# Patient Record
Sex: MALE | Race: OTHER | NOT HISPANIC OR LATINO | URBAN - METROPOLITAN AREA
[De-identification: names, ages, dates, MRNs, and addresses within clinical notes are randomized per-mention and may not be internally consistent; named-entity substitution may affect disease eponyms.]

---

## 2017-05-27 ENCOUNTER — INPATIENT (INPATIENT)
Facility: HOSPITAL | Age: 32
LOS: 5 days | Discharge: HOME HEALTH | End: 2017-06-02
Attending: SURGERY | Admitting: SURGERY
Payer: MEDICAID

## 2017-05-27 VITALS
TEMPERATURE: 98 F | SYSTOLIC BLOOD PRESSURE: 139 MMHG | RESPIRATION RATE: 16 BRPM | OXYGEN SATURATION: 99 % | HEART RATE: 108 BPM | DIASTOLIC BLOOD PRESSURE: 77 MMHG

## 2017-05-27 DIAGNOSIS — A48.0 GAS GANGRENE: ICD-10-CM

## 2017-05-27 DIAGNOSIS — E11.621 TYPE 2 DIABETES MELLITUS WITH FOOT ULCER: ICD-10-CM

## 2017-05-27 LAB
ALBUMIN SERPL ELPH-MCNC: 4.1 G/DL — SIGNIFICANT CHANGE UP (ref 3.3–5)
ALP SERPL-CCNC: 96 U/L — SIGNIFICANT CHANGE UP (ref 40–120)
ALT FLD-CCNC: 31 U/L — SIGNIFICANT CHANGE UP (ref 4–41)
APPEARANCE UR: CLEAR — SIGNIFICANT CHANGE UP
APTT BLD: 30.6 SEC — SIGNIFICANT CHANGE UP (ref 27.5–37.4)
AST SERPL-CCNC: 24 U/L — SIGNIFICANT CHANGE UP (ref 4–40)
BASE EXCESS BLDV CALC-SCNC: 4.5 MMOL/L — SIGNIFICANT CHANGE UP
BASOPHILS # BLD AUTO: 0.04 K/UL — SIGNIFICANT CHANGE UP (ref 0–0.2)
BASOPHILS NFR BLD AUTO: 0.2 % — SIGNIFICANT CHANGE UP (ref 0–2)
BILIRUB SERPL-MCNC: 0.4 MG/DL — SIGNIFICANT CHANGE UP (ref 0.2–1.2)
BILIRUB UR-MCNC: NEGATIVE — SIGNIFICANT CHANGE UP
BLOOD GAS VENOUS - CREATININE: 0.74 MG/DL — SIGNIFICANT CHANGE UP (ref 0.5–1.3)
BLOOD UR QL VISUAL: NEGATIVE — SIGNIFICANT CHANGE UP
BUN SERPL-MCNC: 15 MG/DL — SIGNIFICANT CHANGE UP (ref 7–23)
CALCIUM SERPL-MCNC: 9.5 MG/DL — SIGNIFICANT CHANGE UP (ref 8.4–10.5)
CHLORIDE BLDV-SCNC: 101 MMOL/L — SIGNIFICANT CHANGE UP (ref 96–108)
CHLORIDE SERPL-SCNC: 95 MMOL/L — LOW (ref 98–107)
CO2 SERPL-SCNC: 29 MMOL/L — SIGNIFICANT CHANGE UP (ref 22–31)
COLOR SPEC: YELLOW — SIGNIFICANT CHANGE UP
CREAT SERPL-MCNC: 0.84 MG/DL — SIGNIFICANT CHANGE UP (ref 0.5–1.3)
CRP SERPL-MCNC: 72.4 MG/L — HIGH (ref 0.3–5)
EOSINOPHIL # BLD AUTO: 0.14 K/UL — SIGNIFICANT CHANGE UP (ref 0–0.5)
EOSINOPHIL NFR BLD AUTO: 0.8 % — SIGNIFICANT CHANGE UP (ref 0–6)
ERYTHROCYTE [SEDIMENTATION RATE] IN BLOOD: 40 MM/HR — HIGH (ref 1–15)
GAS PNL BLDV: 130 MMOL/L — LOW (ref 136–146)
GLUCOSE BLDV-MCNC: 252 — HIGH (ref 70–99)
GLUCOSE SERPL-MCNC: 254 MG/DL — HIGH (ref 70–99)
GLUCOSE UR-MCNC: >1000 — SIGNIFICANT CHANGE UP
HCO3 BLDV-SCNC: 27 MMOL/L — SIGNIFICANT CHANGE UP (ref 20–27)
HCT VFR BLD CALC: 42.9 % — SIGNIFICANT CHANGE UP (ref 39–50)
HCT VFR BLDV CALC: 46.6 % — SIGNIFICANT CHANGE UP (ref 39–51)
HGB BLD-MCNC: 14.3 G/DL — SIGNIFICANT CHANGE UP (ref 13–17)
HGB BLDV-MCNC: 15.2 G/DL — SIGNIFICANT CHANGE UP (ref 13–17)
IMM GRANULOCYTES NFR BLD AUTO: 0.2 % — SIGNIFICANT CHANGE UP (ref 0–1.5)
INR BLD: 1.11 — SIGNIFICANT CHANGE UP (ref 0.88–1.17)
KETONES UR-MCNC: SIGNIFICANT CHANGE UP
LACTATE BLDV-MCNC: 1.5 MMOL/L — SIGNIFICANT CHANGE UP (ref 0.5–2)
LEUKOCYTE ESTERASE UR-ACNC: NEGATIVE — SIGNIFICANT CHANGE UP
LYMPHOCYTES # BLD AUTO: 1.98 K/UL — SIGNIFICANT CHANGE UP (ref 1–3.3)
LYMPHOCYTES # BLD AUTO: 10.9 % — LOW (ref 13–44)
MCHC RBC-ENTMCNC: 28.5 PG — SIGNIFICANT CHANGE UP (ref 27–34)
MCHC RBC-ENTMCNC: 33.3 % — SIGNIFICANT CHANGE UP (ref 32–36)
MCV RBC AUTO: 85.5 FL — SIGNIFICANT CHANGE UP (ref 80–100)
MONOCYTES # BLD AUTO: 1.17 K/UL — HIGH (ref 0–0.9)
MONOCYTES NFR BLD AUTO: 6.5 % — SIGNIFICANT CHANGE UP (ref 2–14)
MUCOUS THREADS # UR AUTO: SIGNIFICANT CHANGE UP
NEUTROPHILS # BLD AUTO: 14.76 K/UL — HIGH (ref 1.8–7.4)
NEUTROPHILS NFR BLD AUTO: 81.4 % — HIGH (ref 43–77)
NITRITE UR-MCNC: NEGATIVE — SIGNIFICANT CHANGE UP
PCO2 BLDV: 50 MMHG — SIGNIFICANT CHANGE UP (ref 41–51)
PH BLDV: 7.38 PH — SIGNIFICANT CHANGE UP (ref 7.32–7.43)
PH UR: 6 — SIGNIFICANT CHANGE UP (ref 4.6–8)
PLATELET # BLD AUTO: 347 K/UL — SIGNIFICANT CHANGE UP (ref 150–400)
PMV BLD: 9.7 FL — SIGNIFICANT CHANGE UP (ref 7–13)
PO2 BLDV: 31 MMHG — LOW (ref 35–40)
POTASSIUM BLDV-SCNC: 4.3 MMOL/L — SIGNIFICANT CHANGE UP (ref 3.4–4.5)
POTASSIUM SERPL-MCNC: 4.3 MMOL/L — SIGNIFICANT CHANGE UP (ref 3.5–5.3)
POTASSIUM SERPL-SCNC: 4.3 MMOL/L — SIGNIFICANT CHANGE UP (ref 3.5–5.3)
PROT SERPL-MCNC: 7.7 G/DL — SIGNIFICANT CHANGE UP (ref 6–8.3)
PROT UR-MCNC: 30 — SIGNIFICANT CHANGE UP
PROTHROM AB SERPL-ACNC: 12.5 SEC — SIGNIFICANT CHANGE UP (ref 9.8–13.1)
RBC # BLD: 5.02 M/UL — SIGNIFICANT CHANGE UP (ref 4.2–5.8)
RBC # FLD: 12.9 % — SIGNIFICANT CHANGE UP (ref 10.3–14.5)
RBC CASTS # UR COMP ASSIST: SIGNIFICANT CHANGE UP (ref 0–?)
SAO2 % BLDV: 56.5 % — LOW (ref 60–85)
SODIUM SERPL-SCNC: 134 MMOL/L — LOW (ref 135–145)
SP GR SPEC: 1.03 — SIGNIFICANT CHANGE UP (ref 1–1.03)
SQUAMOUS # UR AUTO: SIGNIFICANT CHANGE UP
UROBILINOGEN FLD QL: 1 E.U. — SIGNIFICANT CHANGE UP (ref 0.1–0.2)
WBC # BLD: 18.13 K/UL — HIGH (ref 3.8–10.5)
WBC # FLD AUTO: 18.13 K/UL — HIGH (ref 3.8–10.5)
WBC UR QL: SIGNIFICANT CHANGE UP (ref 0–?)

## 2017-05-27 PROCEDURE — 93971 EXTREMITY STUDY: CPT | Mod: 26,LT

## 2017-05-27 PROCEDURE — 73590 X-RAY EXAM OF LOWER LEG: CPT | Mod: 26,RT

## 2017-05-27 PROCEDURE — 73630 X-RAY EXAM OF FOOT: CPT | Mod: 26,RT

## 2017-05-27 PROCEDURE — 71020: CPT | Mod: 26

## 2017-05-27 RX ORDER — VANCOMYCIN HCL 1 G
1000 VIAL (EA) INTRAVENOUS ONCE
Qty: 0 | Refills: 0 | Status: COMPLETED | OUTPATIENT
Start: 2017-05-27 | End: 2017-05-27

## 2017-05-27 RX ORDER — CEFAZOLIN SODIUM 1 G
1000 VIAL (EA) INJECTION ONCE
Qty: 0 | Refills: 0 | Status: COMPLETED | OUTPATIENT
Start: 2017-05-27 | End: 2017-05-27

## 2017-05-27 RX ADMIN — Medication 250 MILLIGRAM(S): at 22:43

## 2017-05-27 RX ADMIN — Medication 100 MILLIGRAM(S): at 20:38

## 2017-05-27 RX ADMIN — Medication 100 MILLIGRAM(S): at 21:59

## 2017-05-27 NOTE — ED PROVIDER NOTE - PHYSICAL EXAMINATION
Attending/Emerson: Well-appearing, NAD; PERRL/EOMI, non-icterus, supple, no JOSSELIN, no JVD, RRR, CTAB; Abd-soft, NT/ND, no HSM; no spinal PT; Rt foot-swelling, +warm, 5th toe discoloration with skin breakdown at the base, A&Ox3, nonfocal; Skin-warm/dry

## 2017-05-27 NOTE — ED PROVIDER NOTE - OBJECTIVE STATEMENT
Attending/Emerson: 33 yo M w/ Type I DM p/w foot pain and subjective fever/ Pt reports ~ 1 week ago of "peeling" off a blister on Rt 5th toe subsequent developing swelling, pain and now subjective fever/ Denies weakness, abd pain n/v  Meds: Novolog 70/30 20 U BID prior to meals; Lantus 30U QHS; Glipizide

## 2017-05-27 NOTE — PROGRESS NOTE ADULT - PROBLEM SELECTOR PLAN 1
- Pt seen and examined  - Rec admission  - IV abx Vanco and Zosyn  - Will discuss w/ attending Dr. Blue - Pt seen and examined  - Rec admission   - Added on for partial fifth ray amputation in am w/ Dr. Blue  - Rec IV vanco and zosyn  - Pt consented  - 14 cc of 1% lidocaine plain given about 5th met. Sharp incisional bedside debridement performed of 4th interspace. Gas and purulence expressed. Flushed, cultured, and packed. Dressed with DSD.   - NPO after midnight. PO meds with sips of water

## 2017-05-27 NOTE — PROGRESS NOTE ADULT - SUBJECTIVE AND OBJECTIVE BOX
Patient is a 32y old  Male who presents with a chief complaint of     HPI:      PAST MEDICAL & SURGICAL HISTORY:  Diabetes  No significant past surgical history      MEDICATIONS  (STANDING):    MEDICATIONS  (PRN):      Allergies    No Known Allergies    Intolerances        VITALS:    Vital Signs Last 24 Hrs  T(C): 37.7, Max: 37.7 (05-27 @ 20:40)  T(F): 99.8, Max: 99.8 (05-27 @ 20:40)  HR: 89 (89 - 108)  BP: 116/70 (116/70 - 139/77)  BP(mean): 85 (85 - 85)  RR: 15 (15 - 16)  SpO2: 98% (98% - 99%)    LABS:                          14.3   18.13 )-----------( 347      ( 27 May 2017 19:45 )             42.9       05-27    134<L>  |  95<L>  |  15  ----------------------------<  254<H>  4.3   |  29  |  0.84    Ca    9.5      27 May 2017 19:45    TPro  7.7  /  Alb  4.1  /  TBili  0.4  /  DBili  x   /  AST  24  /  ALT  31  /  AlkPhos  96  05-27      CAPILLARY BLOOD GLUCOSE  219 (27 May 2017 18:56)          LOWER EXTREMITY PHYSICAL EXAM:    Vasular: DP/PT 2/4, B/L, Temperature gradient WNL, B/L.   Neuro: Epicritic sensation diminished to the level of the forefoot, B/L.  Musculoskeletal/Ortho:  Skin: R foot 5th digit ulcer to    RADIOLOGY & ADDITIONAL STUDIES: Patient is a 32y old  Male who presents with a chief complaint of     HPI:  Attending/Emerson: 31 yo M w/ Type I DM p/w foot pain and subjective fever/ Pt reports ~ 1 week ago of "peeling" off a blister on Rt 5th toe subsequent developing swelling, pain and now subjective fever/ Denies weakness, abd pain, n/v. 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       PAST MEDICAL & SURGICAL HISTORY:  Diabetes  No significant past surgical history      MEDICATIONS  (STANDING):    MEDICATIONS  (PRN):    Allergies    No Known Allergies    Intolerances      VITALS:    Vital Signs Last 24 Hrs  T(C): 37.7, Max: 37.7 (05-27 @ 20:40)  T(F): 99.8, Max: 99.8 (05-27 @ 20:40)  HR: 89 (89 - 108)  BP: 116/70 (116/70 - 139/77)  BP(mean): 85 (85 - 85)  RR: 15 (15 - 16)  SpO2: 98% (98% - 99%)    LABS:                        14.3   18.13 )-----------( 347      ( 27 May 2017 19:45 )             42.9       05-27    134<L>  |  95<L>  |  15  ----------------------------<  254<H>  4.3   |  29  |  0.84    Ca    9.5      27 May 2017 19:45    TPro  7.7  /  Alb  4.1  /  TBili  0.4  /  DBili  x   /  AST  24  /  ALT  31  /  AlkPhos  96  05-27      CAPILLARY BLOOD GLUCOSE  219 (27 May 2017 18:56)    LOWER EXTREMITY PHYSICAL EXAM:    Vasular: DP/PT 2/4, B/L, Temperature gradient WNL, B/L.   Neuro: Epicritic sensation diminished to the level of the toes, B/L.  Musculoskeletal/Ortho: S/p previous R foot hallux debridement scar  Skin: R foot 5th digit ulcer to sub Q, 5th digit edematous, erythematous, extends to 4th interspace. After debridement, mild watery purulence expressed from 4th interspace, malodorous, necrotic tissue near 5th met base.     RADIOLOGY & ADDITIONAL STUDIES:  5/27/17 R foot xray: gas gangrene of 4th interspace as read by resident Dr. Bianca Donnelly

## 2017-05-27 NOTE — ED ADULT NURSE NOTE - OBJECTIVE STATEMENT
Received pt to spot 27 A&Ox4 c/o right toe wound Received pt to spot 27 A&Ox4 c/o right toe wound measuring 2cm x 0.5cm and subjective fevers at home, pt denies any other complaints at this time, NAD noted. Reports hx of DM type 1 taking insulin and glipazide. IV placed, labs sent, VS as noted, will reassess.

## 2017-05-28 DIAGNOSIS — L08.9 LOCAL INFECTION OF THE SKIN AND SUBCUTANEOUS TISSUE, UNSPECIFIED: ICD-10-CM

## 2017-05-28 LAB
BLD GP AB SCN SERPL QL: NEGATIVE — SIGNIFICANT CHANGE UP
GRAM STN SPEC: SIGNIFICANT CHANGE UP
GRAM STN WND: SIGNIFICANT CHANGE UP
RH IG SCN BLD-IMP: POSITIVE — SIGNIFICANT CHANGE UP
RH IG SCN BLD-IMP: POSITIVE — SIGNIFICANT CHANGE UP
SPECIMEN SOURCE: SIGNIFICANT CHANGE UP

## 2017-05-28 PROCEDURE — 93923 UPR/LXTR ART STDY 3+ LVLS: CPT | Mod: 26

## 2017-05-28 PROCEDURE — 88311 DECALCIFY TISSUE: CPT | Mod: 26

## 2017-05-28 PROCEDURE — 73630 X-RAY EXAM OF FOOT: CPT | Mod: 26,RT

## 2017-05-28 PROCEDURE — 93010 ELECTROCARDIOGRAM REPORT: CPT

## 2017-05-28 PROCEDURE — 88305 TISSUE EXAM BY PATHOLOGIST: CPT | Mod: 26

## 2017-05-28 PROCEDURE — 99232 SBSQ HOSP IP/OBS MODERATE 35: CPT

## 2017-05-28 RX ORDER — INSULIN LISPRO 100/ML
VIAL (ML) SUBCUTANEOUS EVERY 6 HOURS
Qty: 0 | Refills: 0 | Status: DISCONTINUED | OUTPATIENT
Start: 2017-05-28 | End: 2017-05-28

## 2017-05-28 RX ORDER — INSULIN LISPRO 100/ML
VIAL (ML) SUBCUTANEOUS
Qty: 0 | Refills: 0 | Status: DISCONTINUED | OUTPATIENT
Start: 2017-05-28 | End: 2017-06-02

## 2017-05-28 RX ORDER — SODIUM CHLORIDE 9 MG/ML
1000 INJECTION, SOLUTION INTRAVENOUS
Qty: 0 | Refills: 0 | Status: DISCONTINUED | OUTPATIENT
Start: 2017-05-28 | End: 2017-05-28

## 2017-05-28 RX ORDER — INSULIN GLARGINE 100 [IU]/ML
20 INJECTION, SOLUTION SUBCUTANEOUS AT BEDTIME
Qty: 0 | Refills: 0 | Status: DISCONTINUED | OUTPATIENT
Start: 2017-05-28 | End: 2017-05-28

## 2017-05-28 RX ORDER — VANCOMYCIN HCL 1 G
1000 VIAL (EA) INTRAVENOUS EVERY 12 HOURS
Qty: 0 | Refills: 0 | Status: DISCONTINUED | OUTPATIENT
Start: 2017-05-28 | End: 2017-05-29

## 2017-05-28 RX ORDER — ACETAMINOPHEN 500 MG
650 TABLET ORAL EVERY 6 HOURS
Qty: 0 | Refills: 0 | Status: DISCONTINUED | OUTPATIENT
Start: 2017-05-28 | End: 2017-06-02

## 2017-05-28 RX ORDER — INSULIN GLARGINE 100 [IU]/ML
30 INJECTION, SOLUTION SUBCUTANEOUS AT BEDTIME
Qty: 0 | Refills: 0 | Status: DISCONTINUED | OUTPATIENT
Start: 2017-05-28 | End: 2017-05-31

## 2017-05-28 RX ORDER — PIPERACILLIN AND TAZOBACTAM 4; .5 G/20ML; G/20ML
3.38 INJECTION, POWDER, LYOPHILIZED, FOR SOLUTION INTRAVENOUS EVERY 8 HOURS
Qty: 0 | Refills: 0 | Status: DISCONTINUED | OUTPATIENT
Start: 2017-05-28 | End: 2017-05-30

## 2017-05-28 RX ORDER — HEPARIN SODIUM 5000 [USP'U]/ML
5000 INJECTION INTRAVENOUS; SUBCUTANEOUS EVERY 8 HOURS
Qty: 0 | Refills: 0 | Status: DISCONTINUED | OUTPATIENT
Start: 2017-05-28 | End: 2017-06-02

## 2017-05-28 RX ORDER — INSULIN GLARGINE 100 [IU]/ML
30 INJECTION, SOLUTION SUBCUTANEOUS AT BEDTIME
Qty: 0 | Refills: 0 | Status: DISCONTINUED | OUTPATIENT
Start: 2017-05-28 | End: 2017-05-28

## 2017-05-28 RX ORDER — HYDROMORPHONE HYDROCHLORIDE 2 MG/ML
0.5 INJECTION INTRAMUSCULAR; INTRAVENOUS; SUBCUTANEOUS EVERY 4 HOURS
Qty: 0 | Refills: 0 | Status: DISCONTINUED | OUTPATIENT
Start: 2017-05-28 | End: 2017-05-29

## 2017-05-28 RX ORDER — HYDROMORPHONE HYDROCHLORIDE 2 MG/ML
1 INJECTION INTRAMUSCULAR; INTRAVENOUS; SUBCUTANEOUS EVERY 4 HOURS
Qty: 0 | Refills: 0 | Status: DISCONTINUED | OUTPATIENT
Start: 2017-05-28 | End: 2017-05-29

## 2017-05-28 RX ADMIN — Medication 250 MILLIGRAM(S): at 18:20

## 2017-05-28 RX ADMIN — PIPERACILLIN AND TAZOBACTAM 25 GRAM(S): 4; .5 INJECTION, POWDER, LYOPHILIZED, FOR SOLUTION INTRAVENOUS at 21:45

## 2017-05-28 RX ADMIN — Medication: at 22:41

## 2017-05-28 RX ADMIN — PIPERACILLIN AND TAZOBACTAM 25 GRAM(S): 4; .5 INJECTION, POWDER, LYOPHILIZED, FOR SOLUTION INTRAVENOUS at 13:22

## 2017-05-28 RX ADMIN — HEPARIN SODIUM 5000 UNIT(S): 5000 INJECTION INTRAVENOUS; SUBCUTANEOUS at 21:46

## 2017-05-28 RX ADMIN — Medication 2: at 13:12

## 2017-05-28 RX ADMIN — Medication 6: at 18:20

## 2017-05-28 RX ADMIN — Medication: at 08:11

## 2017-05-28 RX ADMIN — HEPARIN SODIUM 5000 UNIT(S): 5000 INJECTION INTRAVENOUS; SUBCUTANEOUS at 13:13

## 2017-05-28 RX ADMIN — HEPARIN SODIUM 5000 UNIT(S): 5000 INJECTION INTRAVENOUS; SUBCUTANEOUS at 06:35

## 2017-05-28 RX ADMIN — Medication 250 MILLIGRAM(S): at 05:06

## 2017-05-28 RX ADMIN — SODIUM CHLORIDE 125 MILLILITER(S): 9 INJECTION, SOLUTION INTRAVENOUS at 13:13

## 2017-05-28 RX ADMIN — PIPERACILLIN AND TAZOBACTAM 25 GRAM(S): 4; .5 INJECTION, POWDER, LYOPHILIZED, FOR SOLUTION INTRAVENOUS at 06:34

## 2017-05-28 RX ADMIN — INSULIN GLARGINE 30 UNIT(S): 100 INJECTION, SOLUTION SUBCUTANEOUS at 22:30

## 2017-05-28 RX ADMIN — SODIUM CHLORIDE 125 MILLILITER(S): 9 INJECTION, SOLUTION INTRAVENOUS at 02:08

## 2017-05-28 RX ADMIN — SODIUM CHLORIDE 125 MILLILITER(S): 9 INJECTION, SOLUTION INTRAVENOUS at 03:18

## 2017-05-28 NOTE — H&P ADULT - NSHPLABSRESULTS_GEN_ALL_CORE
CBC Full  -  ( 27 May 2017 19:45 )  WBC Count : 18.13 K/uL  Hemoglobin : 14.3 g/dL  Hematocrit : 42.9 %  Platelet Count - Automated : 347 K/uL  Mean Cell Volume : 85.5 fL  Mean Cell Hemoglobin : 28.5 pg  Mean Cell Hemoglobin Concentration : 33.3 %  Auto Neutrophil # : 14.76 K/uL  Auto Lymphocyte # : 1.98 K/uL  Auto Monocyte # : 1.17 K/uL  Auto Eosinophil # : 0.14 K/uL  Auto Basophil # : 0.04 K/uL  Auto Neutrophil % : 81.4 %  Auto Lymphocyte % : 10.9 %  Auto Monocyte % : 6.5 %  Auto Eosinophil % : 0.8 %  Auto Basophil % : 0.2 %    05-27    134<L>  |  95<L>  |  15  ----------------------------<  254<H>  4.3   |  29  |  0.84    Ca    9.5      27 May 2017 19:45    TPro  7.7  /  Alb  4.1  /  TBili  0.4  /  DBili  x   /  AST  24  /  ALT  31  /  AlkPhos  96  05-27      US: no DVT

## 2017-05-28 NOTE — PROGRESS NOTE ADULT - ASSESSMENT
ASSESSMENT/PLAN: FATTHY ANGLE 32y Male with right 5th toe gangrene   - Diet:   - Pain control   - Input and outputs   - DVT ppx   - OOB/incentive spirometry   MEDICATIONS  (STANDING):  vancomycin  IVPB 1000milliGRAM(s) IV Intermittent every 12 hours  piperacillin/tazobactam IVPB. 3.375Gram(s) IV Intermittent every 8 hours  lactated ringers. 1000milliLiter(s) IV Continuous <Continuous>  insulin lispro (HumaLOG) corrective regimen sliding scale  SubCutaneous every 6 hours  insulin glargine Injectable (LANTUS) 20Unit(s) SubCutaneous at bedtime    MEDICATIONS  (PRN):  HYDROmorphone  Injectable 0.5milliGRAM(s) IV Push every 4 hours PRN Mild Pain (1 - 3)  HYDROmorphone  Injectable 1milliGRAM(s) IV Push every 4 hours PRN Moderate Pain (4 - 6) ASSESSMENT/PLAN: FATTHY ANGLE 32y Male with right 5th toe gangrene   - Diet: NPO  - OR with podiatry   - Pain control   - Input and outputs   - DVT ppx   - OOB/incentive spirometry   MEDICATIONS  (STANDING):  vancomycin  IVPB 1000milliGRAM(s) IV Intermittent every 12 hours  piperacillin/tazobactam IVPB. 3.375Gram(s) IV Intermittent every 8 hours  lactated ringers. 1000milliLiter(s) IV Continuous <Continuous>  insulin lispro (HumaLOG) corrective regimen sliding scale  SubCutaneous every 6 hours  insulin glargine Injectable (LANTUS) 20Unit(s) SubCutaneous at bedtime    MEDICATIONS  (PRN):  HYDROmorphone  Injectable 0.5milliGRAM(s) IV Push every 4 hours PRN Mild Pain (1 - 3)  HYDROmorphone  Injectable 1milliGRAM(s) IV Push every 4 hours PRN Moderate Pain (4 - 6)    C team Surgery   Pager: [phone]   Ori Richards M.D. (PGY-1)

## 2017-05-28 NOTE — CONSULT NOTE ADULT - SUBJECTIVE AND OBJECTIVE BOX
Patient is a 32y old  Male who presents with a chief complaint of right 5th toe pain    HPI:  Attending/Emerson: 31 yo M w/ Type I DM p/w foot pain and subjective fever/ Pt reports ~ 1 week ago of "peeling" off a blister on Rt 5th toe subsequent developing swelling, pain and now subjective fever/ Denies weakness, abd pain, n/v. 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       PAST MEDICAL & SURGICAL HISTORY:  Diabetes  No significant past surgical history      MEDICATIONS  (STANDING):    MEDICATIONS  (PRN):    Allergies    No Known Allergies    Intolerances      VITALS:    Vital Signs Last 24 Hrs  T(C): 37.7, Max: 37.7 (05-27 @ 20:40)  T(F): 99.8, Max: 99.8 (05-27 @ 20:40)  HR: 89 (89 - 108)  BP: 116/70 (116/70 - 139/77)  BP(mean): 85 (85 - 85)  RR: 15 (15 - 16)  SpO2: 98% (98% - 99%)    LABS:                        14.3   18.13 )-----------( 347      ( 27 May 2017 19:45 )             42.9       05-27    134<L>  |  95<L>  |  15  ----------------------------<  254<H>  4.3   |  29  |  0.84    Ca    9.5      27 May 2017 19:45    TPro  7.7  /  Alb  4.1  /  TBili  0.4  /  DBili  x   /  AST  24  /  ALT  31  /  AlkPhos  96  05-27      CAPILLARY BLOOD GLUCOSE  219 (27 May 2017 18:56)    LOWER EXTREMITY PHYSICAL EXAM:    Vasular: DP/PT 2/4, B/L, Temperature gradient WNL, B/L.   Neuro: Epicritic sensation diminished to the level of the toes, B/L.  Musculoskeletal/Ortho: S/p previous R foot hallux debridement scar  Skin: R foot 5th digit ulcer to sub Q, 5th digit edematous, erythematous, extends to 4th interspace. After debridement, mild watery purulence expressed from 4th interspace, malodorous, necrotic tissue near 5th met base.     RADIOLOGY & ADDITIONAL STUDIES:  5/27/17 R foot xray: gas gangrene of 4th interspace as read by resident Dr. Bianca Donnelly

## 2017-05-28 NOTE — BRIEF OPERATIVE NOTE - OPERATION/FINDINGS
Necrotic skin, soft tissue, and bone right 5th toe. Proximal tissues viable healthy bleeding soft tissue and bone. Packed.

## 2017-05-28 NOTE — CONSULT NOTE ADULT - PROBLEM SELECTOR RECOMMENDATION 9
- Pt seen and examined  - Rec admission   - Added on for partial fifth ray amputation in am w/ Dr. Blue  - Rec IV vanco and zosyn  - Pt consented  - 14 cc of 1% lidocaine plain given about 5th met. Sharp incisional bedside debridement performed of 4th interspace. Gas and purulence expressed. Flushed, cultured, and packed. Dressed with DSD.   - NPO after midnight. PO meds with sips of water

## 2017-05-28 NOTE — PRE-OP CHECKLIST - PATIENT'S PERSONAL PROPERTY REMOVED
[employer] cellphone/Wallet=$428 cash;[place] debit card;capital one credit card/dispatcher drivers license; fingerhut card; modells card; [place] / Shirt, underpants, jeans with brown belt/glasses/jewelry

## 2017-05-28 NOTE — PROGRESS NOTE ADULT - SUBJECTIVE AND OBJECTIVE BOX
Patient is a 32y old  Male who presents with a chief complaint of right foot pain (28 May 2017 01:57)    Pt is scheduled for OR at 930am with Dr. Blue for right foot partial 5th ray amp, pt is aware of procedure and is NPO since midnight.       Vital Signs Last 24 Hrs  T(C): 37, Max: 37.7 ( @ 20:40)  T(F): 98.6, Max: 99.8 ( @ 20:40)  HR: 87 (83 - 108)  BP: 115/64 (111/64 - 139/77)  BP(mean): 85 (85 - 85)  RR: 17 (15 - 17)  SpO2: 98% (98% - 99%)    LABS:                        14.3   18.13 )-----------( 347      ( 27 May 2017 19:45 )             42.9         134<L>  |  95<L>  |  15  ----------------------------<  254<H>  4.3   |  29  |  0.84    Ca    9.5      27 May 2017 19:45    TPro  7.7  /  Alb  4.1  /  TBili  0.4  /  DBili  x   /  AST  24  /  ALT  31  /  AlkPhos  96      PT/INR - ( 27 May 2017 22:50 )   PT: 12.5 SEC;   INR: 1.11          PTT - ( 27 May 2017 22:50 )  PTT:30.6 SEC  Urinalysis Basic - ( 27 May 2017 19:28 )    Color: YELLOW / Appearance: CLEAR / S.027 / pH: 6.0  Gluc: >1000 / Ketone: TRACE  / Bili: NEGATIVE / Urobili: 1 E.U.   Blood: NEGATIVE / Protein: 30 / Nitrite: NEGATIVE   Leuk Esterase: NEGATIVE / RBC: 0-2 / WBC 2-5   Sq Epi: OCC / Non Sq Epi: x / Bacteria: x      CAPILLARY BLOOD GLUCOSE  261 (28 May 2017 08:15)  215 (28 May 2017 07:11)  219 (27 May 2017 18:56)

## 2017-05-28 NOTE — PROGRESS NOTE ADULT - SUBJECTIVE AND OBJECTIVE BOX
Patient is a 32y old  Male who presents with a chief complaint of right foot pain (28 May 2017 01:57)      SUBJECTIVE: pain controlled, no nausea/vomiting/headache/dizziness/chest pain/shortness of breath    OBJECTIVE:  PHYSICAL EXAM:  GA: NAD  Abdomen:      Vitals/Labs: Patient is a 32y old  Male who presents with a chief complaint of right foot pain (28 May 2017 01:57)      SUBJECTIVE: pain controlled, no nausea/vomiting/headache/dizziness/chest pain/shortness of breath    OBJECTIVE:  PHYSICAL EXAM:  GA: NAD  RLE: dressing clean/dry/intact       Vitals/Labs:    Vital Signs Last 24 Hrs  T(C): 36.9, Max: 37.7 ( @ 20:40)  T(F): 98.5, Max: 99.8 ( @ 20:40)  HR: 83 (83 - 108)  BP: 111/64 (111/64 - 139/77)  BP(mean): 85 (85 - 85)  RR: 17 (15 - 17)  SpO2: 98% (98% - 99%)    I&O's Detail    I & Os for current day (as of 28 May 2017 07:55)  =============================================  IN:    lactated ringers.: 750 ml    Total IN: 750 ml  ---------------------------------------------  OUT:    Total OUT: 0 ml  ---------------------------------------------  Total NET: 750 ml                            14.3   18.13 )-----------( 347      ( 27 May 2017 19:45 )             42.9           134<L>  |  95<L>  |  15  ----------------------------<  254<H>  4.3   |  29  |  0.84    Ca    9.5      27 May 2017 19:45    TPro  7.7  /  Alb  4.1  /  TBili  0.4  /  DBili  x   /  AST  24  /  ALT  31  /  AlkPhos  96        CAPILLARY BLOOD GLUCOSE  215 (28 May 2017 07:11)  219 (27 May 2017 18:56)      LIVER FUNCTIONS - ( 27 May 2017 19:45 )  Alb: 4.1 g/dL / Pro: 7.7 g/dL / ALK PHOS: 96 u/L / ALT: 31 u/L / AST: 24 u/L / GGT: x             PT/INR - ( 27 May 2017 22:50 )   PT: 12.5 SEC;   INR: 1.11          PTT - ( 27 May 2017 22:50 )  PTT:30.6 SEC    Urinalysis Basic - ( 27 May 2017 19:28 )    Color: YELLOW / Appearance: CLEAR / S.027 / pH: 6.0  Gluc: >1000 / Ketone: TRACE  / Bili: NEGATIVE / Urobili: 1 E.U.   Blood: NEGATIVE / Protein: 30 / Nitrite: NEGATIVE   Leuk Esterase: NEGATIVE / RBC: 0-2 / WBC 2-5   Sq Epi: OCC / Non Sq Epi: x / Bacteria: x

## 2017-05-28 NOTE — BRIEF OPERATIVE NOTE - COMMENTS
Patient tolerated procedure and anesthesia well without complication. Patient transported to PACU in stable condition with neurovascular to right foot intact.

## 2017-05-28 NOTE — PATIENT PROFILE ADULT. - NS TRANSFER PATIENT BELONGINGS
Clothing/Jewelry/Money (specify)/Cell Phone/PDA (specify) Jewelry/Money (specify)/[employer] cellphone/Wallet=$428 cash;[employer] debit card;capital one credit card/dispatcher drivers license; fingerhut card; modells card; [place] / Shirt, underpants, jeans with brown belt/Cell Phone/PDA (specify)/Clothing

## 2017-05-28 NOTE — H&P ADULT - HISTORY OF PRESENT ILLNESS
33 yo male with type 1 diabetes complaining of right foot pain for about one week. He states that he had a blister on the toe and he picked the blister off. It subsequently bleed and developed swelling and pain. He denies nausea or vomiting. He notes fever without chills.

## 2017-05-28 NOTE — PROGRESS NOTE ADULT - ASSESSMENT
33M right foot 5th ray gas gangrene  - Consent signed and in chart  - EKG in chart  - CXR on sunrise  - Procedure was explained to pt in detail. All alternatives, risks, and complication were discussed. All questions answered.

## 2017-05-28 NOTE — H&P ADULT - ASSESSMENT
33 yo male with gangrene of the right 5th toe  -NPO  -IV fluids  -Pain control  -OR planning for ray amputation by podiatry

## 2017-05-28 NOTE — H&P ADULT - NSHPPHYSICALEXAM_GEN_ALL_CORE
Gen: NAD  HEENT: EOMI anicteric  CV: S1/S2  Resp: Cta B/L  Abdomen: soft, nontender, nondistended  Ext: Palpable radial, femoral and DP bilaterally, right 5th digit erythematous

## 2017-05-29 LAB
APTT BLD: 31.8 SEC — SIGNIFICANT CHANGE UP (ref 27.5–37.4)
BACTERIA UR CULT: SIGNIFICANT CHANGE UP
BUN SERPL-MCNC: 11 MG/DL — SIGNIFICANT CHANGE UP (ref 7–23)
CALCIUM SERPL-MCNC: 9.3 MG/DL — SIGNIFICANT CHANGE UP (ref 8.4–10.5)
CHLORIDE SERPL-SCNC: 98 MMOL/L — SIGNIFICANT CHANGE UP (ref 98–107)
CO2 SERPL-SCNC: 28 MMOL/L — SIGNIFICANT CHANGE UP (ref 22–31)
CREAT SERPL-MCNC: 0.82 MG/DL — SIGNIFICANT CHANGE UP (ref 0.5–1.3)
GLUCOSE SERPL-MCNC: 225 MG/DL — HIGH (ref 70–99)
HCT VFR BLD CALC: 43.9 % — SIGNIFICANT CHANGE UP (ref 39–50)
HGB BLD-MCNC: 14.4 G/DL — SIGNIFICANT CHANGE UP (ref 13–17)
INR BLD: 1.09 — SIGNIFICANT CHANGE UP (ref 0.88–1.17)
MAGNESIUM SERPL-MCNC: 1.8 MG/DL — SIGNIFICANT CHANGE UP (ref 1.6–2.6)
MCHC RBC-ENTMCNC: 28.6 PG — SIGNIFICANT CHANGE UP (ref 27–34)
MCHC RBC-ENTMCNC: 32.8 % — SIGNIFICANT CHANGE UP (ref 32–36)
MCV RBC AUTO: 87.1 FL — SIGNIFICANT CHANGE UP (ref 80–100)
PHOSPHATE SERPL-MCNC: 3.2 MG/DL — SIGNIFICANT CHANGE UP (ref 2.5–4.5)
PLATELET # BLD AUTO: 320 K/UL — SIGNIFICANT CHANGE UP (ref 150–400)
PMV BLD: 9.4 FL — SIGNIFICANT CHANGE UP (ref 7–13)
POTASSIUM SERPL-MCNC: 4.2 MMOL/L — SIGNIFICANT CHANGE UP (ref 3.5–5.3)
POTASSIUM SERPL-SCNC: 4.2 MMOL/L — SIGNIFICANT CHANGE UP (ref 3.5–5.3)
PROTHROM AB SERPL-ACNC: 12.2 SEC — SIGNIFICANT CHANGE UP (ref 9.8–13.1)
RBC # BLD: 5.04 M/UL — SIGNIFICANT CHANGE UP (ref 4.2–5.8)
RBC # FLD: 12.7 % — SIGNIFICANT CHANGE UP (ref 10.3–14.5)
SODIUM SERPL-SCNC: 138 MMOL/L — SIGNIFICANT CHANGE UP (ref 135–145)
SPECIMEN SOURCE: SIGNIFICANT CHANGE UP
VANCOMYCIN TROUGH SERPL-MCNC: 5.4 UG/ML — LOW (ref 10–20)
WBC # BLD: 11.55 K/UL — HIGH (ref 3.8–10.5)
WBC # FLD AUTO: 11.55 K/UL — HIGH (ref 3.8–10.5)

## 2017-05-29 PROCEDURE — 99232 SBSQ HOSP IP/OBS MODERATE 35: CPT

## 2017-05-29 RX ORDER — VANCOMYCIN HCL 1 G
1000 VIAL (EA) INTRAVENOUS EVERY 8 HOURS
Qty: 0 | Refills: 0 | Status: DISCONTINUED | OUTPATIENT
Start: 2017-05-29 | End: 2017-06-01

## 2017-05-29 RX ORDER — INSULIN LISPRO 100/ML
20 VIAL (ML) SUBCUTANEOUS
Qty: 0 | Refills: 0 | Status: DISCONTINUED | OUTPATIENT
Start: 2017-05-29 | End: 2017-05-31

## 2017-05-29 RX ORDER — OXYCODONE HYDROCHLORIDE 5 MG/1
5 TABLET ORAL EVERY 4 HOURS
Qty: 0 | Refills: 0 | Status: DISCONTINUED | OUTPATIENT
Start: 2017-05-29 | End: 2017-06-02

## 2017-05-29 RX ORDER — OXYCODONE HYDROCHLORIDE 5 MG/1
10 TABLET ORAL EVERY 4 HOURS
Qty: 0 | Refills: 0 | Status: DISCONTINUED | OUTPATIENT
Start: 2017-05-29 | End: 2017-06-02

## 2017-05-29 RX ADMIN — PIPERACILLIN AND TAZOBACTAM 25 GRAM(S): 4; .5 INJECTION, POWDER, LYOPHILIZED, FOR SOLUTION INTRAVENOUS at 14:13

## 2017-05-29 RX ADMIN — Medication 250 MILLIGRAM(S): at 21:50

## 2017-05-29 RX ADMIN — Medication 250 MILLIGRAM(S): at 14:58

## 2017-05-29 RX ADMIN — Medication 8: at 16:51

## 2017-05-29 RX ADMIN — HEPARIN SODIUM 5000 UNIT(S): 5000 INJECTION INTRAVENOUS; SUBCUTANEOUS at 21:55

## 2017-05-29 RX ADMIN — Medication 8: at 11:55

## 2017-05-29 RX ADMIN — Medication: at 08:21

## 2017-05-29 RX ADMIN — Medication 20 UNIT(S): at 16:51

## 2017-05-29 RX ADMIN — HEPARIN SODIUM 5000 UNIT(S): 5000 INJECTION INTRAVENOUS; SUBCUTANEOUS at 05:55

## 2017-05-29 RX ADMIN — PIPERACILLIN AND TAZOBACTAM 25 GRAM(S): 4; .5 INJECTION, POWDER, LYOPHILIZED, FOR SOLUTION INTRAVENOUS at 07:15

## 2017-05-29 RX ADMIN — PIPERACILLIN AND TAZOBACTAM 25 GRAM(S): 4; .5 INJECTION, POWDER, LYOPHILIZED, FOR SOLUTION INTRAVENOUS at 21:50

## 2017-05-29 RX ADMIN — INSULIN GLARGINE 30 UNIT(S): 100 INJECTION, SOLUTION SUBCUTANEOUS at 22:28

## 2017-05-29 RX ADMIN — HEPARIN SODIUM 5000 UNIT(S): 5000 INJECTION INTRAVENOUS; SUBCUTANEOUS at 14:14

## 2017-05-29 RX ADMIN — Medication 250 MILLIGRAM(S): at 06:02

## 2017-05-29 RX ADMIN — Medication: at 22:28

## 2017-05-29 NOTE — PROGRESS NOTE ADULT - SUBJECTIVE AND OBJECTIVE BOX
SURGERY DAILY PROGRESS NOTE:     Hospital Day: 2  Postoperative Day: 1    Overnight Events: Blood cultures preliminarily negative. Elevated FS; bedtime lantus increased to home dose of 30 units.         Physical Exam  Vital Signs Last 24 Hrs  T(C): 37.2, Max: 37.3 ( @ 22:02)  T(F): 99, Max: 99.1 ( @ 22:02)  HR: 80 (68 - 87)  BP: 106/61 (105/60 - 128/74)  BP(mean): --  RR: 18 (11 - 18)  SpO2: 97% (97% - 100%)      I&O's Detail  I & Os for 24h ending 28 May 2017 07:00  =============================================  IN:    lactated ringers.: 750 ml    Total IN: 750 ml  ---------------------------------------------  OUT:    Total OUT: 0 ml  ---------------------------------------------  Total NET: 750 ml    I & Os for current day (as of 29 May 2017 05:28)  =============================================  IN:    IV PiggyBack: 100 ml    lactated ringers.: 60 ml    Total IN: 160 ml  ---------------------------------------------  OUT:    Voided: 3450 ml    Total OUT: 3450 ml  ---------------------------------------------  Total NET: -3290 ml      Labs:                        14.3   18.13 )-----------( 347      ( 27 May 2017 19:45 )             42.9         134<L>  |  95<L>  |  15  ----------------------------<  254<H>  4.3   |  29  |  0.84    Ca    9.5      27 May 2017 19:45    TPro  7.7  /  Alb  4.1  /  TBili  0.4  /  DBili  x   /  AST  24  /  ALT  31  /  AlkPhos  96      PT/INR - ( 27 May 2017 22:50 )   PT: 12.5 SEC;   INR: 1.11          PTT - ( 27 May 2017 22:50 )  PTT:30.6 SEC  Urinalysis Basic - ( 27 May 2017 19:28 )    Color: YELLOW / Appearance: CLEAR / S.027 / pH: 6.0  Gluc: >1000 / Ketone: TRACE  / Bili: NEGATIVE / Urobili: 1 E.U.   Blood: NEGATIVE / Protein: 30 / Nitrite: NEGATIVE   Leuk Esterase: NEGATIVE / RBC: 0-2 / WBC 2-5   Sq Epi: OCC / Non Sq Epi: x / Bacteria: x

## 2017-05-29 NOTE — PHYSICAL THERAPY INITIAL EVALUATION ADULT - PERTINENT HX OF CURRENT PROBLEM, REHAB EVAL
This is a 31 yo male with gangrene of the right 5th toe s/p R foot partial 5th ray resection on 5/28/17.

## 2017-05-29 NOTE — PHYSICAL THERAPY INITIAL EVALUATION ADULT - ACTIVE RANGE OF MOTION EXAMINATION, REHAB EVAL
NT on R foot/bilateral  lower extremity Active ROM was WFL (within functional limits)/bilateral upper extremity Active ROM was WFL (within functional limits)

## 2017-05-29 NOTE — PROGRESS NOTE ADULT - PROBLEM SELECTOR PLAN 1
-s/p partial 5th ray amputation left foot  -Cont IV abx - vanco and zosyn  -OR cultures pending (Clean bone margin and deep wound culture).  Clean bone margin also sent to pathology.    -Will plan on delayed primary closure bedside tomorrow pending appearance of amputation site  -Can be DCd as soon as OR culture data is available - PICC vs PO pending clean bone margin.   -Packed and dressed with DSD

## 2017-05-29 NOTE — PROGRESS NOTE ADULT - SUBJECTIVE AND OBJECTIVE BOX
HPI:  33 yo male with type 1 diabetes complaining of right foot pain for about one week. He states that he had a blister on the toe and he picked the blister off. It subsequently bleed and developed swelling and pain. He denies nausea or vomiting. He notes fever without chills. (28 May 2017 01:57)    Patient is a 32y old  Male who presents with a chief complaint of right foot pain (28 May 2017 01:57)    Allergies    No Known Allergies    Intolerances      Vital Signs Last 24 Hrs  T(C): 36.8, Max: 37.3 (05-28 @ 22:02)  T(F): 98.2, Max: 99.1 (05-28 @ 22:02)  HR: 79 (76 - 87)  BP: 106/68 (105/62 - 110/67)  BP(mean): --  RR: 18 (16 - 18)  SpO2: 100% (97% - 100%)                        14.4   11.55 )-----------( 320      ( 29 May 2017 05:05 )             43.9     05-29    138  |  98  |  11  ----------------------------<  225<H>  4.2   |  28  |  0.82    Ca    9.3      29 May 2017 05:05  Phos  3.2     05-29  Mg     1.8     05-29    TPro  7.7  /  Alb  4.1  /  TBili  0.4  /  DBili  x   /  AST  24  /  ALT  31  /  AlkPhos  96  05-27    CAPILLARY BLOOD GLUCOSE  337 (29 May 2017 11:48)    MEDICATIONS  (STANDING):  vancomycin  IVPB 1000milliGRAM(s) IV Intermittent every 12 hours  piperacillin/tazobactam IVPB. 3.375Gram(s) IV Intermittent every 8 hours  heparin  Injectable 5000Unit(s) SubCutaneous every 8 hours  insulin glargine Injectable (LANTUS) 30Unit(s) SubCutaneous at bedtime  insulin lispro (HumaLOG) corrective regimen sliding scale  SubCutaneous Before meals and at bedtime    MEDICATIONS  (PRN):  acetaminophen   Tablet. 650milliGRAM(s) Oral every 6 hours PRN Mild Pain (1 - 3)  oxyCODONE  5 mG/acetaminophen 325 mG 2Tablet(s) Oral every 4 hours PRN Moderate Pain (4 - 6)  oxyCODONE IR 5milliGRAM(s) Oral every 4 hours PRN Moderate Pain (4 - 6)  oxyCODONE IR 10milliGRAM(s) Oral every 4 hours PRN Severe Pain (7 - 10)    PAST MEDICAL & SURGICAL HISTORY:  Diabetic retinopathy  Diabetes  No significant past surgical history      GISSEL:  Left foot partial 5th ray amputation site open dorsally.  No local signs of infection. No erythema, edema, purulence or drainage, malodor or bone exposed.    Pedal pulses palpable 2/4 bilaterally.

## 2017-05-29 NOTE — PROGRESS NOTE ADULT - ASSESSMENT
32y Male with right 5th toe gangrene s/p open 5th toe amputation by PODs  - Diet:Reg  - Pain control   - Input and outputs   - DVT ppx   - OOB/incentive spirometry     Page [phone] with questions

## 2017-05-30 ENCOUNTER — TRANSCRIPTION ENCOUNTER (OUTPATIENT)
Age: 32
End: 2017-05-30

## 2017-05-30 LAB
-  CEFAZOLIN: SIGNIFICANT CHANGE UP
-  CIPROFLOXACIN: SIGNIFICANT CHANGE UP
-  CLINDAMYCIN: SIGNIFICANT CHANGE UP
-  DAPTOMYCIN: SIGNIFICANT CHANGE UP
-  ERYTHROMYCIN: SIGNIFICANT CHANGE UP
-  GENTAMICIN: SIGNIFICANT CHANGE UP
-  LINEZOLID: SIGNIFICANT CHANGE UP
-  MOXIFLOXACIN(AEROBIC): SIGNIFICANT CHANGE UP
-  OXACILLIN: SIGNIFICANT CHANGE UP
-  PENICILLIN: SIGNIFICANT CHANGE UP
-  RIFAMPIN.: SIGNIFICANT CHANGE UP
-  TETRACYCLINE: SIGNIFICANT CHANGE UP
-  TRIMETHOPRIM/SULFAMETHOXAZOLE: SIGNIFICANT CHANGE UP
-  VANCOMYCIN: SIGNIFICANT CHANGE UP
BUN SERPL-MCNC: 11 MG/DL — SIGNIFICANT CHANGE UP (ref 7–23)
CALCIUM SERPL-MCNC: 9.3 MG/DL — SIGNIFICANT CHANGE UP (ref 8.4–10.5)
CHLORIDE SERPL-SCNC: 97 MMOL/L — LOW (ref 98–107)
CO2 SERPL-SCNC: 26 MMOL/L — SIGNIFICANT CHANGE UP (ref 22–31)
CREAT SERPL-MCNC: 0.73 MG/DL — SIGNIFICANT CHANGE UP (ref 0.5–1.3)
CULTURE RESULTS: SIGNIFICANT CHANGE UP
GLUCOSE SERPL-MCNC: 185 MG/DL — HIGH (ref 70–99)
GRAM STN SPEC: SIGNIFICANT CHANGE UP
HCT VFR BLD CALC: 42.7 % — SIGNIFICANT CHANGE UP (ref 39–50)
HGB BLD-MCNC: 14.2 G/DL — SIGNIFICANT CHANGE UP (ref 13–17)
MCHC RBC-ENTMCNC: 28.5 PG — SIGNIFICANT CHANGE UP (ref 27–34)
MCHC RBC-ENTMCNC: 33.3 % — SIGNIFICANT CHANGE UP (ref 32–36)
MCV RBC AUTO: 85.7 FL — SIGNIFICANT CHANGE UP (ref 80–100)
METHOD TYPE: SIGNIFICANT CHANGE UP
ORGANISM # SPEC MICROSCOPIC CNT: SIGNIFICANT CHANGE UP
PLATELET # BLD AUTO: 326 K/UL — SIGNIFICANT CHANGE UP (ref 150–400)
PMV BLD: 9.5 FL — SIGNIFICANT CHANGE UP (ref 7–13)
POTASSIUM SERPL-MCNC: 3.8 MMOL/L — SIGNIFICANT CHANGE UP (ref 3.5–5.3)
POTASSIUM SERPL-SCNC: 3.8 MMOL/L — SIGNIFICANT CHANGE UP (ref 3.5–5.3)
RBC # BLD: 4.98 M/UL — SIGNIFICANT CHANGE UP (ref 4.2–5.8)
RBC # FLD: 12.5 % — SIGNIFICANT CHANGE UP (ref 10.3–14.5)
SODIUM SERPL-SCNC: 135 MMOL/L — SIGNIFICANT CHANGE UP (ref 135–145)
SPECIMEN SOURCE: SIGNIFICANT CHANGE UP
SPECIMEN SOURCE: SIGNIFICANT CHANGE UP
VANCOMYCIN TROUGH SERPL-MCNC: 13.8 UG/ML — SIGNIFICANT CHANGE UP (ref 10–20)
WBC # BLD: 10.5 K/UL — SIGNIFICANT CHANGE UP (ref 3.8–10.5)
WBC # FLD AUTO: 10.5 K/UL — SIGNIFICANT CHANGE UP (ref 3.8–10.5)

## 2017-05-30 PROCEDURE — 99232 SBSQ HOSP IP/OBS MODERATE 35: CPT

## 2017-05-30 RX ORDER — POTASSIUM CHLORIDE 20 MEQ
20 PACKET (EA) ORAL ONCE
Qty: 0 | Refills: 0 | Status: COMPLETED | OUTPATIENT
Start: 2017-05-30 | End: 2017-05-30

## 2017-05-30 RX ADMIN — Medication 250 MILLIGRAM(S): at 21:05

## 2017-05-30 RX ADMIN — Medication 250 MILLIGRAM(S): at 05:17

## 2017-05-30 RX ADMIN — Medication: at 07:54

## 2017-05-30 RX ADMIN — Medication 20 UNIT(S): at 16:55

## 2017-05-30 RX ADMIN — Medication 650 MILLIGRAM(S): at 23:04

## 2017-05-30 RX ADMIN — Medication 20 MILLIEQUIVALENT(S): at 07:17

## 2017-05-30 RX ADMIN — Medication 250 MILLIGRAM(S): at 14:14

## 2017-05-30 RX ADMIN — Medication 8: at 16:55

## 2017-05-30 RX ADMIN — Medication 650 MILLIGRAM(S): at 20:57

## 2017-05-30 RX ADMIN — HEPARIN SODIUM 5000 UNIT(S): 5000 INJECTION INTRAVENOUS; SUBCUTANEOUS at 05:17

## 2017-05-30 RX ADMIN — OXYCODONE HYDROCHLORIDE 10 MILLIGRAM(S): 5 TABLET ORAL at 04:03

## 2017-05-30 RX ADMIN — PIPERACILLIN AND TAZOBACTAM 25 GRAM(S): 4; .5 INJECTION, POWDER, LYOPHILIZED, FOR SOLUTION INTRAVENOUS at 05:17

## 2017-05-30 RX ADMIN — HEPARIN SODIUM 5000 UNIT(S): 5000 INJECTION INTRAVENOUS; SUBCUTANEOUS at 21:01

## 2017-05-30 RX ADMIN — OXYCODONE HYDROCHLORIDE 10 MILLIGRAM(S): 5 TABLET ORAL at 03:25

## 2017-05-30 RX ADMIN — Medication 20 UNIT(S): at 07:55

## 2017-05-30 RX ADMIN — PIPERACILLIN AND TAZOBACTAM 25 GRAM(S): 4; .5 INJECTION, POWDER, LYOPHILIZED, FOR SOLUTION INTRAVENOUS at 13:17

## 2017-05-30 RX ADMIN — Medication 4: at 22:59

## 2017-05-30 RX ADMIN — HEPARIN SODIUM 5000 UNIT(S): 5000 INJECTION INTRAVENOUS; SUBCUTANEOUS at 13:17

## 2017-05-30 RX ADMIN — INSULIN GLARGINE 30 UNIT(S): 100 INJECTION, SOLUTION SUBCUTANEOUS at 22:59

## 2017-05-30 NOTE — PROGRESS NOTE ADULT - ASSESSMENT
32y Male with right 5th toe gangrene s/p open 5th toe amputation by PODs  - Diet:Reg  - Pain control   - Input and outputs   - DVT ppx   - OOB/incentive spirometry     Page [phone] with questions 32y Male with right 5th toe gangrene s/p open 5th toe amputation by PODs  - Diet:Reg  - Pain control   - Input and outputs   - DVT ppx   - OOB/incentive spirometry     C team Surgery   Pager: [phone]   Ori Richards M.D. (PGY-1)

## 2017-05-30 NOTE — PROGRESS NOTE ADULT - PROBLEM SELECTOR PLAN 1
-s/p partial 5th ray amputation left foot  -Cont IV abx - vanco and zosyn  -OR cultures pending (Clean bone margin and deep wound culture).  Clean bone margin also sent to pathology.    -Was planning on Delayed primary closure bedside today, however the appearance worsened with +erythema.  Will plan on delayed primary closure bedside tomorrow or Thursday pending appearance  -Can be DCd as soon as OR culture data is available - PICC vs PO pending clean bone margin.   -Flushed, Packed and dressed with DSD

## 2017-05-30 NOTE — PROGRESS NOTE ADULT - SUBJECTIVE AND OBJECTIVE BOX
HPI:  33 yo male with type 1 diabetes complaining of right foot pain for about one week. He states that he had a blister on the toe and he picked the blister off. It subsequently bleed and developed swelling and pain. He denies nausea or vomiting. He notes fever without chills. (28 May 2017 01:57)    Patient is a 32y old  Male who presents with a chief complaint of right foot pain (28 May 2017 01:57)    Allergies    No Known Allergies    Intolerances      Vital Signs Last 24 Hrs  T(C): 37, Max: 37.3 (05-29 @ 14:19)  T(F): 98.6, Max: 99.1 (05-29 @ 14:19)  HR: 75 (69 - 90)  BP: 103/61 (90/52 - 150/82)  BP(mean): --  RR: 18 (17 - 18)  SpO2: 98% (98% - 100%)                        14.2   10.50 )-----------( 326      ( 30 May 2017 05:35 )             42.7     05-30    135  |  97<L>  |  11  ----------------------------<  185<H>  3.8   |  26  |  0.73    Ca    9.3      30 May 2017 05:35  Phos  3.2     05-29  Mg     1.8     05-29      CAPILLARY BLOOD GLUCOSE  156 (30 May 2017 07:22)    MEDICATIONS  (STANDING):  piperacillin/tazobactam IVPB. 3.375Gram(s) IV Intermittent every 8 hours  heparin  Injectable 5000Unit(s) SubCutaneous every 8 hours  insulin glargine Injectable (LANTUS) 30Unit(s) SubCutaneous at bedtime  insulin lispro (HumaLOG) corrective regimen sliding scale  SubCutaneous Before meals and at bedtime  vancomycin  IVPB 1000milliGRAM(s) IV Intermittent every 8 hours  insulin lispro Injectable (HumaLOG) 20Unit(s) SubCutaneous three times a day before meals    MEDICATIONS  (PRN):  acetaminophen   Tablet. 650milliGRAM(s) Oral every 6 hours PRN Mild Pain (1 - 3)  oxyCODONE IR 5milliGRAM(s) Oral every 4 hours PRN Moderate Pain (4 - 6)  oxyCODONE IR 10milliGRAM(s) Oral every 4 hours PRN Severe Pain (7 - 10)    PAST MEDICAL & SURGICAL HISTORY:  Diabetic retinopathy  Diabetes  No significant past surgical history          GISSEL:  Left foot partial 5th ray amputation site open dorsally.  + erythema dorsally, no edema, no purulence or drainage, no malodor or bone exposed.    Pedal pulses palpable 2/4 bilaterally.

## 2017-05-31 LAB
BUN SERPL-MCNC: 13 MG/DL — SIGNIFICANT CHANGE UP (ref 7–23)
CALCIUM SERPL-MCNC: 9.6 MG/DL — SIGNIFICANT CHANGE UP (ref 8.4–10.5)
CHLORIDE SERPL-SCNC: 98 MMOL/L — SIGNIFICANT CHANGE UP (ref 98–107)
CO2 SERPL-SCNC: 27 MMOL/L — SIGNIFICANT CHANGE UP (ref 22–31)
CREAT SERPL-MCNC: 0.95 MG/DL — SIGNIFICANT CHANGE UP (ref 0.5–1.3)
GLUCOSE SERPL-MCNC: 210 MG/DL — HIGH (ref 70–99)
GRAM STN WND: SIGNIFICANT CHANGE UP
HCT VFR BLD CALC: 42.3 % — SIGNIFICANT CHANGE UP (ref 39–50)
HGB BLD-MCNC: 14.1 G/DL — SIGNIFICANT CHANGE UP (ref 13–17)
MAGNESIUM SERPL-MCNC: 1.9 MG/DL — SIGNIFICANT CHANGE UP (ref 1.6–2.6)
MCHC RBC-ENTMCNC: 28.3 PG — SIGNIFICANT CHANGE UP (ref 27–34)
MCHC RBC-ENTMCNC: 33.3 % — SIGNIFICANT CHANGE UP (ref 32–36)
MCV RBC AUTO: 84.9 FL — SIGNIFICANT CHANGE UP (ref 80–100)
PHOSPHATE SERPL-MCNC: 3.6 MG/DL — SIGNIFICANT CHANGE UP (ref 2.5–4.5)
PLATELET # BLD AUTO: 372 K/UL — SIGNIFICANT CHANGE UP (ref 150–400)
PMV BLD: 9.2 FL — SIGNIFICANT CHANGE UP (ref 7–13)
POTASSIUM SERPL-MCNC: 4.4 MMOL/L — SIGNIFICANT CHANGE UP (ref 3.5–5.3)
POTASSIUM SERPL-SCNC: 4.4 MMOL/L — SIGNIFICANT CHANGE UP (ref 3.5–5.3)
RBC # BLD: 4.98 M/UL — SIGNIFICANT CHANGE UP (ref 4.2–5.8)
RBC # FLD: 12.4 % — SIGNIFICANT CHANGE UP (ref 10.3–14.5)
SODIUM SERPL-SCNC: 138 MMOL/L — SIGNIFICANT CHANGE UP (ref 135–145)
SPECIMEN SOURCE: SIGNIFICANT CHANGE UP
VANCOMYCIN TROUGH SERPL-MCNC: 15.5 UG/ML — SIGNIFICANT CHANGE UP (ref 10–20)
WBC # BLD: 11.09 K/UL — HIGH (ref 3.8–10.5)
WBC # FLD AUTO: 11.09 K/UL — HIGH (ref 3.8–10.5)

## 2017-05-31 PROCEDURE — 99222 1ST HOSP IP/OBS MODERATE 55: CPT | Mod: GC

## 2017-05-31 RX ORDER — MAGNESIUM SULFATE 500 MG/ML
2 VIAL (ML) INJECTION ONCE
Qty: 0 | Refills: 0 | Status: COMPLETED | OUTPATIENT
Start: 2017-05-31 | End: 2017-05-31

## 2017-05-31 RX ORDER — INSULIN LISPRO 100/ML
22 VIAL (ML) SUBCUTANEOUS
Qty: 0 | Refills: 0 | Status: DISCONTINUED | OUTPATIENT
Start: 2017-05-31 | End: 2017-06-02

## 2017-05-31 RX ORDER — INSULIN GLARGINE 100 [IU]/ML
33 INJECTION, SOLUTION SUBCUTANEOUS AT BEDTIME
Qty: 0 | Refills: 0 | Status: DISCONTINUED | OUTPATIENT
Start: 2017-05-31 | End: 2017-06-02

## 2017-05-31 RX ADMIN — HEPARIN SODIUM 5000 UNIT(S): 5000 INJECTION INTRAVENOUS; SUBCUTANEOUS at 21:53

## 2017-05-31 RX ADMIN — Medication 4: at 11:44

## 2017-05-31 RX ADMIN — Medication 650 MILLIGRAM(S): at 09:30

## 2017-05-31 RX ADMIN — Medication 250 MILLIGRAM(S): at 06:38

## 2017-05-31 RX ADMIN — INSULIN GLARGINE 33 UNIT(S): 100 INJECTION, SOLUTION SUBCUTANEOUS at 23:11

## 2017-05-31 RX ADMIN — HEPARIN SODIUM 5000 UNIT(S): 5000 INJECTION INTRAVENOUS; SUBCUTANEOUS at 06:38

## 2017-05-31 RX ADMIN — HEPARIN SODIUM 5000 UNIT(S): 5000 INJECTION INTRAVENOUS; SUBCUTANEOUS at 15:05

## 2017-05-31 RX ADMIN — Medication 1 TABLET(S): at 18:20

## 2017-05-31 RX ADMIN — Medication 22 UNIT(S): at 17:18

## 2017-05-31 RX ADMIN — Medication 50 GRAM(S): at 08:59

## 2017-05-31 RX ADMIN — Medication 6: at 08:34

## 2017-05-31 RX ADMIN — Medication 250 MILLIGRAM(S): at 15:05

## 2017-05-31 RX ADMIN — Medication 2: at 23:10

## 2017-05-31 RX ADMIN — Medication 20 UNIT(S): at 11:43

## 2017-05-31 RX ADMIN — Medication 650 MILLIGRAM(S): at 08:57

## 2017-05-31 RX ADMIN — Medication 250 MILLIGRAM(S): at 21:53

## 2017-05-31 RX ADMIN — Medication 20 UNIT(S): at 08:34

## 2017-05-31 NOTE — CONSULT NOTE ADULT - ATTENDING COMMENTS
32 year old with DM with right fifth toe ulcer with gas gangrene.  S/p amputation with cultures growing MRSA and group b strep.    The case was discussed with podiatry, they think that the OM has been resected. The wound is open.    I would treat with antibiotics for 14 days post op- at discharge can change to doxycycline 100 q 12 and augmentin 875 q 12.

## 2017-05-31 NOTE — CONSULT NOTE ADULT - SUBJECTIVE AND OBJECTIVE BOX
Consultation Requested by:    Patient is a 32y old  Male who presents with a chief complaint of right foot pain (28 May 2017 01:57)    HPI:  31 yo male with type 1 diabetes complaining of right foot pain for about one week. He states that he had a blister on the toe and he picked the blister off. It subsequently bleed and developed swelling and pain. He denies nausea or vomiting. He notes fever without chills. (28 May 2017 01:57)        REVIEW OF SYSTEMS  All review of systems negative, except for those marked:  General:		[] Abnormal:  	[] Night Sweats		[] Fever		[] Weight Loss  Pulmonary/Cough:	[] Abnormal:  Cardiac/Chest Pain:	[] Abnormal:  Gastrointestinal:	[] Abnormal:  Eyes:			[] Abnormal:  ENT:			[] Abnormal:  :		[] Abnormal:  Musculoskeletal	:	[] Abnormal:  Endocrine:		[] Abnormal:  Lymph Nodes:		[] Abnormal:  Headache:		[] Abnormal:  Skin:			[] Abnormal:  Allergy/Immune:	[] Abnormal:  Psychiatric:		[] Abnormal:  [] All other review of systems negative  [] Unable to obtain (explain):    Recent Ill Contacts:	[] No	[] Yes:  Recent Travel History:	[] No	[] Yes:  Recent Animal/Insect Exposure/Tick Bites:	[] No	[] Yes:    Allergies    No Known Allergies    Intolerances      Antimicrobials:  vancomycin  IVPB 1000milliGRAM(s) IV Intermittent every 8 hours      Other Medications:  heparin  Injectable 5000Unit(s) SubCutaneous every 8 hours  acetaminophen   Tablet. 650milliGRAM(s) Oral every 6 hours PRN  insulin glargine Injectable (LANTUS) 30Unit(s) SubCutaneous at bedtime  insulin lispro (HumaLOG) corrective regimen sliding scale  SubCutaneous Before meals and at bedtime  oxyCODONE IR 5milliGRAM(s) Oral every 4 hours PRN  oxyCODONE IR 10milliGRAM(s) Oral every 4 hours PRN  insulin lispro Injectable (HumaLOG) 20Unit(s) SubCutaneous three times a day before meals      FAMILY HISTORY:  No pertinent family history in first degree relatives    PAST MEDICAL & SURGICAL HISTORY:  Diabetic retinopathy  Diabetes  No significant past surgical history    SOCIAL HISTORY:    IMMUNIZATIONS  [] Up to Date		[] Not Up to Date:  Recent Immunizations:	[] No	[] Yes:    Daily     Daily Weight in k (31 May 2017 01:46)      Vital Signs Last 24 Hrs  T(C): 37, Max: 37.1 (05-30 @ 17:01)  T(F): 98.6, Max: 98.8 (05-30 @ 17:01)  HR: 77 (67 - 84)  BP: 102/62 (95/55 - 121/71)  BP(mean): --  RR: 17 (15 - 19)  SpO2: 100% (98% - 100%)    PHYSICAL EXAM  All physical exam findings normal, except for those marked:  General:	Normal: alert, neither acutely nor chronically ill-appearing, well developed/well   .		nourished, no respiratory distress  .		[] Abnormal:  Eyes		Normal: no conjunctival injection, no discharge, no photophobia, intact   .		extraocular movements, sclera not icteric  .		[] Abnormal:  ENT:		Normal: normal tympanic membranes; external ear normal, nares normal without   .		discharge, no pharyngeal erythema or exudates, no oral mucosal lesions, normal   .		tongue and lips  .		[] Abnormal:  Neck		Normal: supple, full range of motion, no nuchal rigidity  .		[] Abnormal:  Lymph Nodes	Normal: normal size and consistency, non-tender  .		[] Abnormal:  Cardiovascular	Normal: regular rate and variability; Normal S1, S2; No murmur  .		[] Abnormal:  Respiratory	Normal: no wheezing or crackles, bilateral audible breath sounds, no retractions  .		[] Abnormal:  Abdominal	Normal: soft; non-distended; non-tender; no hepatosplenomegaly or masses  .		[] Abnormal:  		Normal: normal external genitalia, no rash  .		[] Abnormal:  Extremities	Normal: FROM x4, no cyanosis or edema, symmetric pulses  .		[] Abnormal:  Skin		Normal: skin intact and not indurated; no rash, no desquamation  .		[] Abnormal:  Neurologic	Normal: alert, oriented as age-appropriate, affect appropriate; no weakness, no   .		facial asymmetry, moves all extremities, normal gait-child older than 18 months  .		[] Abnormal:  Musculoskeletal		Normal: no joint swelling, erythema, or tenderness; full range of motion   .			with no contractures; no muscle tenderness; no clubbing; no cyanosis;   .			no edema  .			[] Abnormal    Respiratory Support:		[] No	[] Yes:  Vasoactive medication infusion:	[] No	[] Yes:  Venous catheters:		[] No	[] Yes:  Bladder catheter:		[] No	[] Yes:  Other catheters or tubes:	[] No	[] Yes:    Lab Results:                        14.1   11.09 )-----------( 372      ( 31 May 2017 05:20 )             42.3         138  |  98  |  13  ----------------------------<  210<H>  4.4   |  27  |  0.95    Ca    9.6      31 May 2017 05:20  Phos  3.6       Mg     1.9                   MICROBIOLOGY  Culture - Abscess with Gram Stain (17 @ 03:29)  MRSA and Gr B srtrep    -  Linezolid: S 2 ARISTIDES    -  Penicillin: R >8 ARISTIDES    -  Cefazolin: R 16 ARISTIDES    -  Daptomycin: S <=0.5 ARISTIDES    -  Erythromycin: R >4 ARISTIDES    -  Moxifloxacin(Aerobic): S <=0.5 ARISTIDES    -  Rifampin: S <=1 ARISTIDES    -  Trimethoprim/Sulfamethoxazole: S <=0.5/9.5 ARISTIDES    -  Ciprofloxacin: I 2 ARISTIDES    -  Clindamycin: S <=0.5 ARISTIDES    -  Gentamicin: S <=4 ARISTIDES    -  Oxacillin: R >2 ARISTIDES    -  Tetra/Doxy: S <=4 ARISTIDES    -  Vancomycin: S 1 ARISTIDES Consultation Requested by: Nino Perkins    Patient is a 32y old  Male who presents with a chief complaint of right foot pain (28 May 2017 01:57)    HPI:  31 yo male with type 1 diabetes complaining of right foot pain for about one week. He states that he had a blister on the toe and he picked the blister off. It subsequently bleed and developed swelling and pain. He denies nausea or vomiting. He reports subjective chills for 2 days prior to arrival. denies any trauma to the leg, but mentions that has had similat infection of R great toe that required a longer course of abx more than 2 years ago    REVIEW OF SYSTEMS  All review of systems negative, except for those marked:  General:		[] Abnormal:  	[] Night Sweats		[] Fever		[] Weight Loss  Pulmonary/Cough:	[] Abnormal:  Cardiac/Chest Pain:	[] Abnormal:  Gastrointestinal:	[] Abnormal:  Eyes:			[] Abnormal:  ENT:			[] Abnormal:  :		[] Abnormal:  Musculoskeletal	:	[] Abnormal:  Endocrine:		[] Abnormal:  Lymph Nodes:		[] Abnormal:  Headache:		[] Abnormal:  Skin:			[x] Abnormal: R 5th toe ulcer  Allergy/Immune:	[] Abnormal:  Psychiatric:		[] Abnormal:  [] All other review of systems negative  [] Unable to obtain (explain):    Recent Ill Contacts:	[] No	[] Yes:  Recent Travel History:	[] No	[] Yes:  Recent Animal/Insect Exposure/Tick Bites:	[] No	[] Yes:    Allergies    No Known Allergies    Intolerances      Antimicrobials:  vancomycin  IVPB 1000milliGRAM(s) IV Intermittent every 8 hours      Other Medications:  heparin  Injectable 5000Unit(s) SubCutaneous every 8 hours  acetaminophen   Tablet. 650milliGRAM(s) Oral every 6 hours PRN  insulin glargine Injectable (LANTUS) 30Unit(s) SubCutaneous at bedtime  insulin lispro (HumaLOG) corrective regimen sliding scale  SubCutaneous Before meals and at bedtime  oxyCODONE IR 5milliGRAM(s) Oral every 4 hours PRN  oxyCODONE IR 10milliGRAM(s) Oral every 4 hours PRN  insulin lispro Injectable (HumaLOG) 20Unit(s) SubCutaneous three times a day before meals      FAMILY HISTORY:  No pertinent family history in first degree relatives    PAST MEDICAL & SURGICAL HISTORY:  Diabetic retinopathy  Diabetes  No significant past surgical history    SOCIAL HISTORY:    IMMUNIZATIONS  [] Up to Date		[] Not Up to Date:  Recent Immunizations:	[] No	[] Yes:    Daily     Daily Weight in k (31 May 2017 01:46)      Vital Signs Last 24 Hrs  T(C): 37, Max: 37.1 (05-30 @ 17:01)  T(F): 98.6, Max: 98.8 (05-30 @ 17:01)  HR: 77 (67 - 84)  BP: 102/62 (95/55 - 121/71)  BP(mean): --  RR: 17 (15 - 19)  SpO2: 100% (98% - 100%)    PHYSICAL EXAM  All physical exam findings normal, except for those marked:  General:	Normal: alert, neither acutely nor chronically ill-appearing, well developed/well   .		nourished, no respiratory distress  .		[] Abnormal:  Eyes		Normal: no conjunctival injection, no discharge, no photophobia, intact   .		extraocular movements, sclera not icteric  .		[] Abnormal:  ENT:		Normal: normal tympanic membranes; external ear normal, nares normal without   .		discharge, no pharyngeal erythema or exudates, no oral mucosal lesions, normal   .		tongue and lips  .		[] Abnormal:  Neck		Normal: supple, full range of motion, no nuchal rigidity  .		[] Abnormal:  Lymph Nodes	Normal: normal size and consistency, non-tender  .		[] Abnormal:  Cardiovascular	Normal: regular rate and variability; Normal S1, S2; No murmur  .		[] Abnormal:  Respiratory	Normal: no wheezing or crackles, bilateral audible breath sounds, no retractions  .		[] Abnormal:  Abdominal	Normal: soft; non-distended; non-tender; no hepatosplenomegaly or masses  .		[] Abnormal:  		Normal: normal external genitalia, no rash  .		[] Abnormal:  Extremities	Normal: FROM x4, no cyanosis or edema, symmetric pulses  .		[] Abnormal:  Skin		Normal: skin intact and not indurated; no rash, no desquamation  .		[x] Abnormal: R 5th toe s/p amputation, surgical site looks clean, sutures intact, minimal serous drainage  Neurologic	Normal: alert, oriented as age-appropriate, affect appropriate; no weakness, no   .		facial asymmetry, moves all extremities,    Musculoskeletal		Normal: no joint swelling, erythema, or tenderness; full range of motion   .			with no contractures; no muscle tenderness; no clubbing; no cyanosis;   .			no edema  .			[] Abnormal    Respiratory Support:		[] No	[] Yes:  Vasoactive medication infusion:	[] No	[] Yes:  Venous catheters:		[] No	[] Yes:  Bladder catheter:		[] No	[] Yes:  Other catheters or tubes:	[] No	[] Yes:    Lab Results:                        14.1   11.09 )-----------( 372      ( 31 May 2017 05:20 )             42.3     -    138  |  98  |  13  ----------------------------<  210<H>  4.4   |  27  |  0.95    Ca    9.6      31 May 2017 05:20  Phos  3.6       Mg     1.9                   MICROBIOLOGY  Culture - Abscess with Gram Stain (17 @ 03:29)  MRSA and Gr B srtrep    -  Linezolid: S 2 ARISTIDES    -  Penicillin: R >8 ARISTIDES    -  Cefazolin: R 16 ARISTIDES    -  Daptomycin: S <=0.5 ARISTIDES    -  Erythromycin: R >4 ARISTIDES    -  Moxifloxacin(Aerobic): S <=0.5 ARISTIDES    -  Rifampin: S <=1 ARISTIDES    -  Trimethoprim/Sulfamethoxazole: S <=0.5/9.5 ARISTIDES    -  Ciprofloxacin: I 2 ARISTIDES    -  Clindamycin: S <=0.5 ARISTIDES    -  Gentamicin: S <=4 ARISTIDES    -  Oxacillin: R >2 ARISTIDES    -  Tetra/Doxy: S <=4 ARISTIDES    -  Vancomycin: S 1 ARISTIDES    Culture - Blood (17 @ 20:45)    Culture - Blood:   NO ORGANISMS ISOLATED  NO ORGANISMS ISOLATED AT 48 HRS.    Specimen Source: BLOOD PERIPHERAL Consultation Requested by: Nino Perkins    Patient is a 32y old  Male who presents with a chief complaint of right foot pain (28 May 2017 01:57)    HPI:  33 yo male with type 1 diabetes complaining of right foot pain for about one week. He states that he had a blister on the toe and he picked the blister off. It subsequently bleed and developed swelling and pain. He denies nausea or vomiting. He reports subjective chills for 2 days prior to arrival. denies any trauma to the leg, but mentions that has had similat infection of R great toe that required a longer course of abx more than 2 years ago    REVIEW OF SYSTEMS  All review of systems negative, except for those marked:  General:		[] Abnormal:  	[] Night Sweats		[] Fever		[] Weight Loss  Pulmonary/Cough:	[] Abnormal:  Cardiac/Chest Pain:	[] Abnormal:  Gastrointestinal:	[] Abnormal:  Eyes:			[] Abnormal:  ENT:			[] Abnormal:  :		[] Abnormal:  Musculoskeletal	:	[] Abnormal:  Endocrine:		[] Abnormal:  Lymph Nodes:		[] Abnormal:  Headache:		[] Abnormal:  Skin:			[x] Abnormal: R 5th toe ulcer  Allergy/Immune:	[] Abnormal:  Psychiatric:		[] Abnormal:  [] All other review of systems negative  [] Unable to obtain (explain):    Recent Ill Contacts:	[] No	[] Yes:  Recent Travel History:	[] No	[] Yes:  Recent Animal/Insect Exposure/Tick Bites:	[] No	[] Yes:    Allergies    No Known Allergies    Intolerances      Antimicrobials:  vancomycin  IVPB 1000milliGRAM(s) IV Intermittent every 8 hours      Other Medications:  heparin  Injectable 5000Unit(s) SubCutaneous every 8 hours  acetaminophen   Tablet. 650milliGRAM(s) Oral every 6 hours PRN  insulin glargine Injectable (LANTUS) 30Unit(s) SubCutaneous at bedtime  insulin lispro (HumaLOG) corrective regimen sliding scale  SubCutaneous Before meals and at bedtime  oxyCODONE IR 5milliGRAM(s) Oral every 4 hours PRN  oxyCODONE IR 10milliGRAM(s) Oral every 4 hours PRN  insulin lispro Injectable (HumaLOG) 20Unit(s) SubCutaneous three times a day before meals      FAMILY HISTORY:  No pertinent family history in first degree relatives    PAST MEDICAL & SURGICAL HISTORY:  Diabetic retinopathy  Diabetes  No significant past surgical history    SOCIAL HISTORY:    IMMUNIZATIONS  [] Up to Date		[] Not Up to Date:  Recent Immunizations:	[] No	[] Yes:    Daily     Daily Weight in k (31 May 2017 01:46)      Vital Signs Last 24 Hrs  T(C): 37, Max: 37.1 (05-30 @ 17:01)  T(F): 98.6, Max: 98.8 (05-30 @ 17:01)  HR: 77 (67 - 84)  BP: 102/62 (95/55 - 121/71)  BP(mean): --  RR: 17 (15 - 19)  SpO2: 100% (98% - 100%)    PHYSICAL EXAM  All physical exam findings normal, except for those marked:  General:	Normal: alert, neither acutely nor chronically ill-appearing, well developed/well   .		nourished, no respiratory distress  .		[] Abnormal:  Eyes		Normal: no conjunctival injection, no discharge, no photophobia, intact   .		extraocular movements, sclera not icteric  .		[] Abnormal:  ENT:		Normal: normal tympanic membranes; external ear normal, nares normal without   .		discharge, no pharyngeal erythema or exudates, no oral mucosal lesions, normal   .		tongue and lips  .		[] Abnormal:  Neck		Normal: supple, full range of motion, no nuchal rigidity  .		[] Abnormal:  Lymph Nodes	Normal: normal size and consistency, non-tender  .		[] Abnormal:  Cardiovascular	Normal: regular rate and variability; Normal S1, S2; No murmur  .		[] Abnormal:  Respiratory	Normal: no wheezing or crackles, bilateral audible breath sounds, no retractions  .		[] Abnormal:  Abdominal	Normal: soft; non-distended; non-tender; no hepatosplenomegaly or masses  .		[] Abnormal:  		Normal: normal external genitalia, no rash  .		[] Abnormal:  Extremities	Normal: FROM x4, no cyanosis or edema, symmetric pulses  .		[] Abnormal:  Skin		Normal: skin intact and not indurated; no rash, no desquamation  .		[x] Abnormal: R 5th toe s/p amputation, surgical site looks clean, sutures intact, minimal serous drainage  Neurologic	Normal: alert, oriented as age-appropriate, affect appropriate; no weakness, no   .		facial asymmetry, moves all extremities,    Musculoskeletal		Normal: no joint swelling, erythema, or tenderness; full range of motion   .			with no contractures; no muscle tenderness; no clubbing; no cyanosis;   .			no edema  .			[] Abnormal    Respiratory Support:		[] No	[] Yes:  Vasoactive medication infusion:	[] No	[] Yes:  Venous catheters:		[] No	[] Yes:  Bladder catheter:		[] No	[] Yes:  Other catheters or tubes:	[] No	[] Yes:    Lab Results:                        14.1   11.09 )-----------( 372      ( 31 May 2017 05:20 )             42.3     -    138  |  98  |  13  ----------------------------<  210<H>  4.4   |  27  |  0.95    Ca    9.6      31 May 2017 05:20  Phos  3.6       Mg     1.9                   MICROBIOLOGY  Culture - Abscess with Gram Stain (17 @ 03:29)  MRSA and Gr B strep    -  Linezolid: S 2 ARISTIDES    -  Penicillin: R >8 ARISTIDES    -  Cefazolin: R 16 ARISTIDES    -  Daptomycin: S <=0.5 ARISTIDES    -  Erythromycin: R >4 ARISTIDES    -  Moxifloxacin(Aerobic): S <=0.5 ARISTIDES    -  Rifampin: S <=1 ARISTIDES    -  Trimethoprim/Sulfamethoxazole: S <=0.5/9.5 ARISTIDES    -  Ciprofloxacin: I 2 ARISTIDES    -  Clindamycin: S <=0.5 ARISTIDES    -  Gentamicin: S <=4 ARISTIDES    -  Oxacillin: R >2 ARISTIDES    -  Tetra/Doxy: S <=4 ARISTIDES    -  Vancomycin: S 1 ARISTIDES    Culture - Blood (17 @ 20:45)    Culture - Blood:   NO ORGANISMS ISOLATED  NO ORGANISMS ISOLATED AT 48 HRS.    Specimen Source: BLOOD PERIPHERAL    Xray of R foot:   IMPRESSION: Soft tissue defect on the plantar surface of the fifth digit.   No radiographic evidence of osteomyelitis. If there is persistent   clinical suspicion for osteomyelitis, more sensitive evaluation with a   nuclear bone scan or MRI may be performed Consultation Requested by: Nino Perkins    Patient is a 32y old  Male who presents with a chief complaint of right foot pain (28 May 2017 01:57)    HPI:  31 yo male with type 1 diabetes complaining of right foot pain for about one week. He states that he had a blister on the toe and he picked the blister off. It subsequently bleed and developed swelling and pain. He denies nausea or vomiting. He reports subjective chills for 2 days prior to arrival. denies any trauma to the leg, but mentions that has had similat infection of R great toe that required a longer course of abx more than 2 years ago    REVIEW OF SYSTEMS  All review of systems negative, except for those marked:  General:		[] Abnormal:  	[] Night Sweats		[] Fever		[] Weight Loss  Pulmonary/Cough:	[] Abnormal:  Cardiac/Chest Pain:	[] Abnormal:  Gastrointestinal:	[] Abnormal:  Eyes:			[] Abnormal:  ENT:			[] Abnormal:  :		[] Abnormal:  Musculoskeletal	:	[] Abnormal:  Endocrine:		[] Abnormal:  Lymph Nodes:		[] Abnormal:  Headache:		[] Abnormal:  Skin:			[x] Abnormal: R 5th toe ulcer  Allergy/Immune:	[] Abnormal:  Psychiatric:		[] Abnormal:  [] All other review of systems negative  [] Unable to obtain (explain):    Recent Ill Contacts:	[] No	[] Yes:  Recent Travel History:	[] No	[] Yes:  Recent Animal/Insect Exposure/Tick Bites:	[] No	[] Yes:    Allergies    No Known Allergies    Intolerances      Antimicrobials:  vancomycin  IVPB 1000milliGRAM(s) IV Intermittent every 8 hours      Other Medications:  heparin  Injectable 5000Unit(s) SubCutaneous every 8 hours  acetaminophen   Tablet. 650milliGRAM(s) Oral every 6 hours PRN  insulin glargine Injectable (LANTUS) 30Unit(s) SubCutaneous at bedtime  insulin lispro (HumaLOG) corrective regimen sliding scale  SubCutaneous Before meals and at bedtime  oxyCODONE IR 5milliGRAM(s) Oral every 4 hours PRN  oxyCODONE IR 10milliGRAM(s) Oral every 4 hours PRN  insulin lispro Injectable (HumaLOG) 20Unit(s) SubCutaneous three times a day before meals      FAMILY HISTORY:  No pertinent family history in first degree relatives    PAST MEDICAL & SURGICAL HISTORY:  Diabetic retinopathy  Diabetes  No significant past surgical history    SOCIAL HISTORY:    IMMUNIZATIONS  [] Up to Date		[] Not Up to Date:  Recent Immunizations:	[] No	[] Yes:    Daily     Daily Weight in k (31 May 2017 01:46)      Vital Signs Last 24 Hrs  T(C): 37, Max: 37.1 (05-30 @ 17:01)  T(F): 98.6, Max: 98.8 (05-30 @ 17:01)  HR: 77 (67 - 84)  BP: 102/62 (95/55 - 121/71)  BP(mean): --  RR: 17 (15 - 19)  SpO2: 100% (98% - 100%)    PHYSICAL EXAM  All physical exam findings normal, except for those marked:  General:	Normal: alert, neither acutely nor chronically ill-appearing, well developed/well   .		nourished, no respiratory distress  .		[] Abnormal:  Eyes		Normal: no conjunctival injection, no discharge, no photophobia, intact   .		extraocular movements, sclera not icteric  .		[] Abnormal:  ENT:		Normal: normal tympanic membranes; external ear normal, nares normal without   .		discharge, no pharyngeal erythema or exudates, no oral mucosal lesions, normal   .		tongue and lips  .		[] Abnormal:  Neck		Normal: supple, full range of motion, no nuchal rigidity  .		[] Abnormal:  Lymph Nodes	Normal: normal size and consistency, non-tender  .		[] Abnormal:  Cardiovascular	Normal: regular rate and variability; Normal S1, S2; No murmur  .		[] Abnormal:  Respiratory	Normal: no wheezing or crackles, bilateral audible breath sounds, no retractions  .		[] Abnormal:  Abdominal	Normal: soft; non-distended; non-tender; no hepatosplenomegaly or masses  .		[] Abnormal:  		Normal: normal external genitalia, no rash  .		[] Abnormal:  Extremities	Normal: FROM x4, no cyanosis or edema, symmetric pulses  .		[] Abnormal:  Skin		Normal: skin intact and not indurated; no rash, no desquamation  .		[x] Abnormal: R 5th toe s/p amputation, surgical site looks clean, sutures intact, minimal serous drainage  Neurologic	Normal: alert, oriented as age-appropriate, affect appropriate; no weakness, no   .		facial asymmetry, moves all extremities,    Musculoskeletal		Normal: no joint swelling, erythema, or tenderness; full range of motion   .			with no contractures; no muscle tenderness; no clubbing; no cyanosis;   .			no edema  .			[] Abnormal    Respiratory Support:		[] No	[] Yes:  Vasoactive medication infusion:	[] No	[] Yes:  Venous catheters:		[] No	[X] Yes:  Bladder catheter:		[] No	[] Yes:  Other catheters or tubes:	[] No	[] Yes:    Lab Results:                        14.1   11.09 )-----------( 372      ( 31 May 2017 05:20 )             42.3     -    138  |  98  |  13  ----------------------------<  210<H>  4.4   |  27  |  0.95    Ca    9.6      31 May 2017 05:20  Phos  3.6       Mg     1.9                   MICROBIOLOGY  Culture - Abscess with Gram Stain (17 @ 03:29)  MRSA and Gr B strep    -  Linezolid: S 2 ARISTIDES    -  Penicillin: R >8 ARISTIDES    -  Cefazolin: R 16 ARISTIDES    -  Daptomycin: S <=0.5 ARISTIDES    -  Erythromycin: R >4 ARISTIDES    -  Moxifloxacin(Aerobic): S <=0.5 ARISTIDES    -  Rifampin: S <=1 ARISTIDES    -  Trimethoprim/Sulfamethoxazole: S <=0.5/9.5 ARISTIDES    -  Ciprofloxacin: I 2 ARISTIDES    -  Clindamycin: S <=0.5 ARISTIDES    -  Gentamicin: S <=4 ARISTIDES    -  Oxacillin: R >2 ARISTIDES    -  Tetra/Doxy: S <=4 ARISTIDES    -  Vancomycin: S 1 ARISTIDES    Culture - Blood (17 @ 20:45)    Culture - Blood:   NO ORGANISMS ISOLATED  NO ORGANISMS ISOLATED AT 48 HRS.    Specimen Source: BLOOD PERIPHERAL    Xray of R foot:   IMPRESSION: Soft tissue defect on the plantar surface of the fifth digit.   No radiographic evidence of osteomyelitis. If there is persistent   clinical suspicion for osteomyelitis, more sensitive evaluation with a   nuclear bone scan or MRI may be performed      OPERATIVE NOTE:  Necrotic skin, soft tissue, and bone right 5th toe. Proximal tissues viable healthy bleeding soft tissue and bone. Packed. Consultation Requested by: Nino Perkins    Patient is a 32y old  Male who presents with a chief complaint of right foot pain (28 May 2017 01:57)    HPI:  31 yo male with type 1 diabetes complaining of right foot pain for about one week. He states that he had a blister on the toe and he picked the blister off. It subsequently bleed and developed swelling and pain. He denies nausea or vomiting. He reports subjective chills for 2 days prior to arrival. denies any trauma to the leg, but mentions that has had similat infection of R great toe that required a longer course of abx more than 2 years ago    REVIEW OF SYSTEMS  All review of systems negative, except for those marked:  General:		[] Abnormal:  	[] Night Sweats		[] Fever		[] Weight Loss  Pulmonary/Cough:	[] Abnormal:  Cardiac/Chest Pain:	[] Abnormal:  Gastrointestinal:	[] Abnormal:  Eyes:			[] Abnormal:  ENT:			[] Abnormal:  :		[] Abnormal:  Musculoskeletal	:	[] Abnormal:  Endocrine:		[] Abnormal:  Lymph Nodes:		[] Abnormal:  Headache:		[] Abnormal:  Skin:			[x] Abnormal: R 5th toe ulcer  Allergy/Immune:	[] Abnormal:  Psychiatric:		[] Abnormal:  [] All other review of systems negative  [] Unable to obtain (explain):    Recent Ill Contacts:	[] No	[] Yes:  Recent Travel History:	[] No	[] Yes:  Recent Animal/Insect Exposure/Tick Bites:	[] No	[] Yes:    Allergies    No Known Allergies    Intolerances      Antimicrobials:  vancomycin  IVPB 1000milliGRAM(s) IV Intermittent every 8 hours      Other Medications:  heparin  Injectable 5000Unit(s) SubCutaneous every 8 hours  acetaminophen   Tablet. 650milliGRAM(s) Oral every 6 hours PRN  insulin glargine Injectable (LANTUS) 30Unit(s) SubCutaneous at bedtime  insulin lispro (HumaLOG) corrective regimen sliding scale  SubCutaneous Before meals and at bedtime  oxyCODONE IR 5milliGRAM(s) Oral every 4 hours PRN  oxyCODONE IR 10milliGRAM(s) Oral every 4 hours PRN  insulin lispro Injectable (HumaLOG) 20Unit(s) SubCutaneous three times a day before meals      FAMILY HISTORY:  No pertinent family history in first degree relatives    PAST MEDICAL & SURGICAL HISTORY:  Diabetic retinopathy  Diabetes  No significant past surgical history    SOCIAL HISTORY:    IMMUNIZATIONS  [] Up to Date		[] Not Up to Date:  Recent Immunizations:	[] No	[] Yes:    Daily     Daily Weight in k (31 May 2017 01:46)      Vital Signs Last 24 Hrs  T(C): 37, Max: 37.1 (05-30 @ 17:01)  T(F): 98.6, Max: 98.8 (05-30 @ 17:01)  HR: 77 (67 - 84)  BP: 102/62 (95/55 - 121/71)  BP(mean): --  RR: 17 (15 - 19)  SpO2: 100% (98% - 100%)    PHYSICAL EXAM  All physical exam findings normal, except for those marked:  General:	Normal: alert, neither acutely nor chronically ill-appearing, well developed/well   .		nourished, no respiratory distress  .		[] Abnormal:  Eyes		Normal: no conjunctival injection, no discharge, no photophobia, intact   .		extraocular movements, sclera not icteric  .		[] Abnormal:  ENT:		Normal: normal tympanic membranes; external ear normal, nares normal without   .		discharge, no pharyngeal erythema or exudates, no oral mucosal lesions, normal   .		tongue and lips  .		[] Abnormal:  Neck		Normal: supple, full range of motion, no nuchal rigidity  .		[] Abnormal:  Lymph Nodes	Normal: normal size and consistency, non-tender  .		[] Abnormal:  Cardiovascular	Normal: regular rate and variability; Normal S1, S2; No murmur  .		[] Abnormal:  Respiratory	Normal: no wheezing or crackles, bilateral audible breath sounds, no retractions  .		[] Abnormal:  Abdominal	Normal: soft; non-distended; non-tender; no hepatosplenomegaly or masses  .		[] Abnormal:  		Normal: normal external genitalia, no rash  .		[] Abnormal:  Extremities	Normal: FROM x4, no cyanosis or edema, symmetric pulses  .		[] Abnormal:  Skin		Normal: skin intact and not indurated; no rash, no desquamation  .		[x] Abnormal: R 5th toe s/p amputation, surgical site looks clean, sutures intact, minimal serous drainage  Neurologic	Normal: alert, oriented as age-appropriate, affect appropriate; no weakness, no   .		facial asymmetry, moves all extremities,    Musculoskeletal		Normal: no joint swelling, erythema, or tenderness; full range of motion   .			with no contractures; no muscle tenderness; no clubbing; no cyanosis;   .			no edema  .			[] Abnormal    Respiratory Support:		[] No	[] Yes:  Vasoactive medication infusion:	[] No	[] Yes:  Venous catheters:		[] No	[X] Yes:  Bladder catheter:		[] No	[] Yes:  Other catheters or tubes:	[] No	[] Yes:    Lab Results:                        14.1   11.09 )-----------( 372      ( 31 May 2017 05:20 )             42.3     -    138  |  98  |  13  ----------------------------<  210<H>  4.4   |  27  |  0.95    Ca    9.6      31 May 2017 05:20  Phos  3.6       Mg     1.9                   MICROBIOLOGY  Culture - Abscess with Gram Stain (17 @ 03:29)  MRSA and Gr B strep    -  Linezolid: S 2 ARISTIDES    -  Penicillin: R >8 ARISTIDES    -  Cefazolin: R 16 ARISTIDES    -  Daptomycin: S <=0.5 ARISTIDES    -  Erythromycin: R >4 ARISTIDES    -  Moxifloxacin(Aerobic): S <=0.5 ARISTIDES    -  Rifampin: S <=1 ARISTIDES    -  Trimethoprim/Sulfamethoxazole: S <=0.5/9.5 ARISTIDES    -  Ciprofloxacin: I 2 ARISTIDES    -  Clindamycin: S <=0.5 ARISTIDES    -  Gentamicin: S <=4 ARISTIDES    -  Oxacillin: R >2 ARISTIDES    -  Tetra/Doxy: S <=4 ARISTIDES    -  Vancomycin: S 1 ARISTIDES    Culture - Blood (17 @ 20:45)    Culture - Blood:   NO ORGANISMS ISOLATED  NO ORGANISMS ISOLATED AT 48 HRS.    Specimen Source: BLOOD PERIPHERAL    Xray of R foot:   IMPRESSION: Soft tissue defect on the plantar surface of the fifth digit.   No radiographic evidence of osteomyelitis. If there is persistent   clinical suspicion for osteomyelitis, more sensitive evaluation with a   nuclear bone scan or MRI may be performed    REPEAT XRAY FOOT: POST AMPUTATION  Partially amputated 5th ray through distal metatarsal shaft with sharp   and smooth osseous stump margin and with postsurgical change in the   overlying soft tissues. No proximally tracking gas collections radiopaque   foreign bodies or focal areas of osteomyelitis.      OPERATIVE NOTE:  Necrotic skin, soft tissue, and bone right 5th toe. Proximal tissues viable healthy bleeding soft tissue and bone. Packed. Consultation Requested by: Nino Perkins    Patient is a 32y old  Male who presents with a chief complaint of right foot pain (28 May 2017 01:57)    HPI:  31 yo male with type 1 diabetes complaining of right foot pain for about one week. He states that he had a blister on the toe and he picked the blister off. It subsequently bleed and developed swelling and pain. He denies nausea or vomiting. He reports subjective chills for 2 days prior to arrival. denies any trauma to the leg, but mentions that has had similat infection of R great toe that required a longer course of abx more than 2 years ago.     On admission was found to have gas gangrene of r 5th toe s/p amputation , now POD#3    REVIEW OF SYSTEMS  All review of systems negative, except for those marked:  General:		[] Abnormal:  	[] Night Sweats		[] Fever		[] Weight Loss  Pulmonary/Cough:	[] Abnormal:  Cardiac/Chest Pain:	[] Abnormal:  Gastrointestinal:	[] Abnormal:  Eyes:			[] Abnormal:  ENT:			[] Abnormal:  :		[] Abnormal:  Musculoskeletal	:	[] Abnormal:  Endocrine:		[] Abnormal:  Lymph Nodes:		[] Abnormal:  Headache:		[] Abnormal:  Skin:			[x] Abnormal: R 5th toe ulcer  Allergy/Immune:	[] Abnormal:  Psychiatric:		[] Abnormal:  [] All other review of systems negative  [] Unable to obtain (explain):    Recent Ill Contacts:	[] No	[] Yes:  Recent Travel History:	[] No	[] Yes:  Recent Animal/Insect Exposure/Tick Bites:	[] No	[] Yes:    Allergies    No Known Allergies    Intolerances      Antimicrobials:  vancomycin  IVPB 1000milliGRAM(s) IV Intermittent every 8 hours      Other Medications:  heparin  Injectable 5000Unit(s) SubCutaneous every 8 hours  acetaminophen   Tablet. 650milliGRAM(s) Oral every 6 hours PRN  insulin glargine Injectable (LANTUS) 30Unit(s) SubCutaneous at bedtime  insulin lispro (HumaLOG) corrective regimen sliding scale  SubCutaneous Before meals and at bedtime  oxyCODONE IR 5milliGRAM(s) Oral every 4 hours PRN  oxyCODONE IR 10milliGRAM(s) Oral every 4 hours PRN  insulin lispro Injectable (HumaLOG) 20Unit(s) SubCutaneous three times a day before meals      FAMILY HISTORY:  No pertinent family history in first degree relatives    PAST MEDICAL & SURGICAL HISTORY:  Diabetic retinopathy  Diabetes  No significant past surgical history    SOCIAL HISTORY:    IMMUNIZATIONS  [] Up to Date		[] Not Up to Date:  Recent Immunizations:	[] No	[] Yes:    Daily     Daily Weight in k (31 May 2017 01:46)      Vital Signs Last 24 Hrs  T(C): 37, Max: 37.1 (05-30 @ 17:01)  T(F): 98.6, Max: 98.8 (05-30 @ 17:01)  HR: 77 (67 - 84)  BP: 102/62 (95/55 - 121/71)  BP(mean): --  RR: 17 (15 - 19)  SpO2: 100% (98% - 100%)    PHYSICAL EXAM  All physical exam findings normal, except for those marked:  General:	Normal: alert, neither acutely nor chronically ill-appearing, well developed/well   .		nourished, no respiratory distress  .		[] Abnormal:  Eyes		Normal: no conjunctival injection, no discharge, no photophobia, intact   .		extraocular movements, sclera not icteric  .		[] Abnormal:  ENT:		Normal: normal tympanic membranes; external ear normal, nares normal without   .		discharge, no pharyngeal erythema or exudates, no oral mucosal lesions, normal   .		tongue and lips  .		[] Abnormal:  Neck		Normal: supple, full range of motion, no nuchal rigidity  .		[] Abnormal:  Lymph Nodes	Normal: normal size and consistency, non-tender  .		[] Abnormal:  Cardiovascular	Normal: regular rate and variability; Normal S1, S2; No murmur  .		[] Abnormal:  Respiratory	Normal: no wheezing or crackles, bilateral audible breath sounds, no retractions  .		[] Abnormal:  Abdominal	Normal: soft; non-distended; non-tender; no hepatosplenomegaly or masses  .		[] Abnormal:  		Normal: normal external genitalia, no rash  .		[] Abnormal:  Extremities	Normal: FROM x4, no cyanosis or edema, symmetric pulses  .		[] Abnormal:  Skin		Normal: skin intact and not indurated; no rash, no desquamation  .		[x] Abnormal: R 5th toe s/p amputation, surgical site looks clean, sutures intact, minimal serous drainage  Neurologic	Normal: alert, oriented as age-appropriate, affect appropriate; no weakness, no   .		facial asymmetry, moves all extremities,    Musculoskeletal		Normal: no joint swelling, erythema, or tenderness; full range of motion   .			with no contractures; no muscle tenderness; no clubbing; no cyanosis;   .			no edema  .			[] Abnormal    Respiratory Support:		[] No	[] Yes:  Vasoactive medication infusion:	[] No	[] Yes:  Venous catheters:		[] No	[X] Yes:  Bladder catheter:		[] No	[] Yes:  Other catheters or tubes:	[] No	[] Yes:    Lab Results:                        14.1   11.09 )-----------( 372      ( 31 May 2017 05:20 )             42.3         138  |  98  |  13  ----------------------------<  210<H>  4.4   |  27  |  0.95    Ca    9.6      31 May 2017 05:20  Phos  3.6       Mg     1.9                   MICROBIOLOGY  Culture - Abscess with Gram Stain (17 @ 03:29)  MRSA and Gr B strep    -  Linezolid: S 2 ARISTIDES    -  Penicillin: R >8 ARISTIDES    -  Cefazolin: R 16 ARISTIDES    -  Daptomycin: S <=0.5 ARISTIDES    -  Erythromycin: R >4 ARISTIDES    -  Moxifloxacin(Aerobic): S <=0.5 ARISTIDES    -  Rifampin: S <=1 ARISTIDES    -  Trimethoprim/Sulfamethoxazole: S <=0.5/9.5 ARISTIDES    -  Ciprofloxacin: I 2 ARISTIDES    -  Clindamycin: S <=0.5 ARISTIDES    -  Gentamicin: S <=4 ARISTIDES    -  Oxacillin: R >2 ARISTIDES    -  Tetra/Doxy: S <=4 ARISTIDES    -  Vancomycin: S 1 ARISTIDES    Culture - Blood (17 @ 20:45)    Culture - Blood:   NO ORGANISMS ISOLATED  NO ORGANISMS ISOLATED AT 48 HRS.    Specimen Source: BLOOD PERIPHERAL    Xray of R foot:   IMPRESSION: Soft tissue defect on the plantar surface of the fifth digit.   No radiographic evidence of osteomyelitis. If there is persistent   clinical suspicion for osteomyelitis, more sensitive evaluation with a   nuclear bone scan or MRI may be performed    REPEAT XRAY FOOT: POST AMPUTATION  Partially amputated 5th ray through distal metatarsal shaft with sharp   and smooth osseous stump margin and with postsurgical change in the   overlying soft tissues. No proximally tracking gas collections radiopaque   foreign bodies or focal areas of osteomyelitis.      OPERATIVE NOTE:   Necrotic skin, soft tissue, and bone right 5th toe. Proximal tissues viable healthy bleeding soft tissue and bone. Packed.

## 2017-05-31 NOTE — PROGRESS NOTE ADULT - ASSESSMENT
32y Male with right 5th toe gangrene s/p open 5th toe amputation by PODs  - Diet:Reg  - Pain control   - Input and outputs   - DVT ppx   - OOB/incentive spirometry     C team Surgery   Pager: [phone] 32y Male with right 5th toe gangrene s/p open 5th toe amputation by PODs  - Diet:Reg  - Pain control   - Input and outputs   - DVT ppx   - OOB/incentive spirometry   - f/u ID  - f/u podiatry for closure    C team Surgery   Pager: [phone]   Ori Richards M.D. (PGY-1)

## 2017-05-31 NOTE — PROGRESS NOTE ADULT - SUBJECTIVE AND OBJECTIVE BOX
Patient seen at bedside resting comfortably in NAD, patient is AXOX3.     Vital Signs Last 24 Hrs  T(C): 37, Max: 37.1 (05-30 @ 17:01)  T(F): 98.6, Max: 98.8 (05-30 @ 17:01)  HR: 83 (67 - 84)  BP: 121/71 (95/55 - 121/71)  BP(mean): --  RR: 15 (15 - 19)  SpO2: 100% (98% - 100%)                          14.1   11.09 )-----------( 372      ( 31 May 2017 05:20 )             42.3   05-31    138  |  98  |  13  ----------------------------<  210<H>  4.4   |  27  |  0.95    Ca    9.6      31 May 2017 05:20  Phos  3.6     05-31  Mg     1.9     05-31    GISSEL: Right foot surgical site open with granular base, mild sangenous drainage, no purulence, no malodor, no active bleeding or evidence of hematoma, plantar sutures intact, mild surrounding erythema to mid foot and slight edema. Surrounding CFT < 3 seconds

## 2017-05-31 NOTE — PROGRESS NOTE ADULT - ASSESSMENT
A/P: 34yo male s/p right foot partial 5th ray amputation secondary to gas gangrene   ·	Patient seen and examined   ·	Surgical site closed 90% with most distal aspect left open for packing   ·	No further surgical plan at this time   ·	Will continue to monitor appearance   ·	Continue with IV antibiotics

## 2017-05-31 NOTE — PROGRESS NOTE ADULT - SUBJECTIVE AND OBJECTIVE BOX
SURGERY DAILY PROGRESS NOTE:     Hospital day: 4  Post operative day: 3    Overnight events: pain controlled, no nausea/vomiting/headache/dizziness/chest pain/shortness of breath    PHYSICAL EXAM:  GA: NAD  RLE: dressing clean/dry/intact   Pulses: b/l DP palpable    Vital Signs Last 24 Hrs  T(C): 36.9, Max: 37.1 (05-30 @ 17:01)  T(F): 98.5, Max: 98.8 (05-30 @ 17:01)  HR: 67 (67 - 84)  BP: 95/55 (95/55 - 117/69)  BP(mean): --  RR: 18 (18 - 19)  SpO2: 100% (98% - 100%)    I&O's Detail  I & Os for 24h ending 30 May 2017 07:00  =============================================  IN:    IV PiggyBack: 350 ml    Total IN: 350 ml  ---------------------------------------------  OUT:    Voided: 2500 ml    Total OUT: 2500 ml  ---------------------------------------------  Total NET: -2150 ml    I & Os for current day (as of 31 May 2017 04:51)  =============================================  IN:    IV PiggyBack: 600 ml    Total IN: 600 ml  ---------------------------------------------  OUT:    Voided: 1100 ml    Total OUT: 1100 ml  ---------------------------------------------  Total NET: -500 ml                            14.2   10.50 )-----------( 326      ( 30 May 2017 05:35 )             42.7       05-30    135  |  97<L>  |  11  ----------------------------<  185<H>  3.8   |  26  |  0.73    Ca    9.3      30 May 2017 05:35  Phos  3.2     05-29  Mg     1.8     05-29 SURGERY DAILY PROGRESS NOTE:     Hospital day: 4  Post operative day: 3    Overnight events: pain controlled, no nausea/vomiting/headache/dizziness/chest pain/shortness of breath    PHYSICAL EXAM:  GA: NAD  RLE: dressing clean/dry/intact   Pulses: b/l DP palpable    Vital Signs Last 24 Hrs  T(C): 37, Max: 37.1 (05-30 @ 17:01)  T(F): 98.6, Max: 98.8 (05-30 @ 17:01)  HR: 83 (67 - 84)  BP: 121/71 (95/55 - 121/71)  BP(mean): --  RR: 15 (15 - 19)  SpO2: 100% (98% - 100%)    I&O's Detail  I & Os for 24h ending 31 May 2017 07:00  =============================================  IN:    IV PiggyBack: 600 ml    Total IN: 600 ml  ---------------------------------------------  OUT:    Voided: 1100 ml    Total OUT: 1100 ml  ---------------------------------------------  Total NET: -500 ml    I & Os for current day (as of 31 May 2017 07:51)  =============================================  IN:    IV PiggyBack: 250 ml    Total IN: 250 ml  ---------------------------------------------  OUT:    Total OUT: 0 ml  ---------------------------------------------  Total NET: 250 ml                            14.1   11.09 )-----------( 372      ( 31 May 2017 05:20 )             42.3       05-31    138  |  98  |  13  ----------------------------<  210<H>  4.4   |  27  |  0.95    Ca    9.6      31 May 2017 05:20  Phos  3.6     05-31  Mg     1.9     05-31        CAPILLARY BLOOD GLUCOSE  245 (30 May 2017 22:32)  319 (30 May 2017 16:08)  79 (30 May 2017 11:37)

## 2017-05-31 NOTE — CONSULT NOTE ADULT - ASSESSMENT
33 yo male with type 1 diabetes complaining of right foot pain for about one week. He states that he had a blister on the toe and he picked the blister off. It subsequently bleed and developed swelling and pain, s/p amputation of R5th toe, cultures positive for MRSA and Gr B strep.     Recommend:  -Fu blood cultures  -C/W vancomyin for now, monitor trough  -Local care of the wound, per surgery  -Fu surgical path, if negative for osteo-will likely give a short course of abx  -Will discuss with attending 31 yo male with type 1 diabetes complaining of right foot pain for about one week. He states that he had a blister on the toe and he picked the blister off. It subsequently bleed and developed swelling and pain. 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 Cultures positive for MRSA and Gr B strep.     Recommend:  -Fu blood cultures  -C/W vancomyin for now, monitor trough  -Local care of the wound, per surgery  -Fu surgical path, if negative for osteo-will likely give a short course of abx  -Will discuss with attending

## 2017-06-01 LAB
BACTERIA BLD CULT: SIGNIFICANT CHANGE UP
BUN SERPL-MCNC: 14 MG/DL — SIGNIFICANT CHANGE UP (ref 7–23)
CALCIUM SERPL-MCNC: 9.5 MG/DL — SIGNIFICANT CHANGE UP (ref 8.4–10.5)
CHLORIDE SERPL-SCNC: 96 MMOL/L — LOW (ref 98–107)
CO2 SERPL-SCNC: 25 MMOL/L — SIGNIFICANT CHANGE UP (ref 22–31)
CREAT SERPL-MCNC: 0.94 MG/DL — SIGNIFICANT CHANGE UP (ref 0.5–1.3)
GLUCOSE SERPL-MCNC: 320 MG/DL — HIGH (ref 70–99)
HCT VFR BLD CALC: 41 % — SIGNIFICANT CHANGE UP (ref 39–50)
HGB BLD-MCNC: 13.6 G/DL — SIGNIFICANT CHANGE UP (ref 13–17)
MAGNESIUM SERPL-MCNC: 2 MG/DL — SIGNIFICANT CHANGE UP (ref 1.6–2.6)
MCHC RBC-ENTMCNC: 28.4 PG — SIGNIFICANT CHANGE UP (ref 27–34)
MCHC RBC-ENTMCNC: 33.2 % — SIGNIFICANT CHANGE UP (ref 32–36)
MCV RBC AUTO: 85.6 FL — SIGNIFICANT CHANGE UP (ref 80–100)
PHOSPHATE SERPL-MCNC: 3.4 MG/DL — SIGNIFICANT CHANGE UP (ref 2.5–4.5)
PLATELET # BLD AUTO: 395 K/UL — SIGNIFICANT CHANGE UP (ref 150–400)
PMV BLD: 9.4 FL — SIGNIFICANT CHANGE UP (ref 7–13)
POTASSIUM SERPL-MCNC: 4.7 MMOL/L — SIGNIFICANT CHANGE UP (ref 3.5–5.3)
POTASSIUM SERPL-SCNC: 4.7 MMOL/L — SIGNIFICANT CHANGE UP (ref 3.5–5.3)
RBC # BLD: 4.79 M/UL — SIGNIFICANT CHANGE UP (ref 4.2–5.8)
RBC # FLD: 12.5 % — SIGNIFICANT CHANGE UP (ref 10.3–14.5)
SODIUM SERPL-SCNC: 135 MMOL/L — SIGNIFICANT CHANGE UP (ref 135–145)
WBC # BLD: 10.25 K/UL — SIGNIFICANT CHANGE UP (ref 3.8–10.5)
WBC # FLD AUTO: 10.25 K/UL — SIGNIFICANT CHANGE UP (ref 3.8–10.5)

## 2017-06-01 PROCEDURE — 99232 SBSQ HOSP IP/OBS MODERATE 35: CPT

## 2017-06-01 RX ADMIN — Medication 650 MILLIGRAM(S): at 20:30

## 2017-06-01 RX ADMIN — Medication 1 TABLET(S): at 16:58

## 2017-06-01 RX ADMIN — Medication 650 MILLIGRAM(S): at 19:44

## 2017-06-01 RX ADMIN — Medication 8: at 07:49

## 2017-06-01 RX ADMIN — Medication 250 MILLIGRAM(S): at 14:08

## 2017-06-01 RX ADMIN — HEPARIN SODIUM 5000 UNIT(S): 5000 INJECTION INTRAVENOUS; SUBCUTANEOUS at 06:42

## 2017-06-01 RX ADMIN — Medication 22 UNIT(S): at 07:50

## 2017-06-01 RX ADMIN — Medication 6: at 12:16

## 2017-06-01 RX ADMIN — Medication 22 UNIT(S): at 12:16

## 2017-06-01 RX ADMIN — HEPARIN SODIUM 5000 UNIT(S): 5000 INJECTION INTRAVENOUS; SUBCUTANEOUS at 22:45

## 2017-06-01 RX ADMIN — Medication 250 MILLIGRAM(S): at 06:42

## 2017-06-01 RX ADMIN — INSULIN GLARGINE 33 UNIT(S): 100 INJECTION, SOLUTION SUBCUTANEOUS at 22:45

## 2017-06-01 RX ADMIN — HEPARIN SODIUM 5000 UNIT(S): 5000 INJECTION INTRAVENOUS; SUBCUTANEOUS at 14:09

## 2017-06-01 RX ADMIN — Medication 22 UNIT(S): at 16:58

## 2017-06-01 RX ADMIN — Medication 1 TABLET(S): at 06:42

## 2017-06-01 NOTE — PROGRESS NOTE ADULT - ATTENDING COMMENTS
32 year old with DM with right fifth toe ulcer with gas gangrene.  S/p amputation with cultures growing MRSA and group b strep.    The case was discussed with podiatry, they think that the OM has been resected. The wound is open.    I would treat with antibiotics for 14 days post op- at discharge can change to doxycycline 100 q 12 and augmentin 875 q 12 at discharge.    Will sign off, call with questions
Patient seen and examined with resident. Awaiting final podiatry and ID recommendations prior to discharge.
Patient seen and examined. Awaiting OR culture results and antibiotic plan prior to discharge.

## 2017-06-01 NOTE — PROGRESS NOTE ADULT - ASSESSMENT
32y Male with right 5th toe gangrene s/p open 5th toe amputation by PODs, partial closure (5/31)    - Diet:Reg  - Pain control   - Input and outputs   - DVT ppx   - OOB/incentive spirometry   - f/u ID    C team Surgery   Pager: [phone] 32y Male with right 5th toe gangrene s/p open 5th toe amputation by PODs, partial closure (5/31)    - Diet:Reg  - Pain control   - Input and outputs   - DVT ppx   - OOB/incentive spirometry   - f/u ID  - f/u PODs  - f/u culture    C team Surgery   Pager: [phone]   Ori Richards M.D. (PGY-1)

## 2017-06-01 NOTE — PROGRESS NOTE ADULT - SUBJECTIVE AND OBJECTIVE BOX
SURGERY DAILY PROGRESS NOTE:     Hospital day: 5  Post operative day: 4    Overnight events: Borderline hypoglycemia overnight (FS 64); FS increased to 200 with administration of juice.     PHYSICAL EXAM:  GA: NAD  RLE: dressing clean/dry/intact   Pulses: b/l DP palpable SURGERY DAILY PROGRESS NOTE:     Hospital day: 5  Post operative day: 4    Overnight events: Borderline hypoglycemia overnight (FS 64); FS increased to 200 with administration of juice. pain controlled, no nausea/vomiting/headache/dizziness/chest pain/shortness of breath      PHYSICAL EXAM:  GA: NAD  RLE: dressing clean/dry/intact   Pulses: b/l DP palpable    Vital Signs Last 24 Hrs  T(C): 36.6, Max: 37.3 (06-01 @ 01:40)  T(F): 97.9, Max: 99.2 (06-01 @ 01:40)  HR: 84 (73 - 99)  BP: 110/58 (98/53 - 120/67)  BP(mean): --  RR: 16 (16 - 18)  SpO2: 100% (97% - 100%)    I&O's Detail  I & Os for 24h ending 31 May 2017 07:00  =============================================  IN:    IV PiggyBack: 600 ml    Total IN: 600 ml  ---------------------------------------------  OUT:    Voided: 1100 ml    Total OUT: 1100 ml  ---------------------------------------------  Total NET: -500 ml    I & Os for current day (as of 01 Jun 2017 06:59)  =============================================  IN:    IV PiggyBack: 250 ml    Total IN: 250 ml  ---------------------------------------------  OUT:    Total OUT: 0 ml  ---------------------------------------------  Total NET: 250 ml                            13.6   10.25 )-----------( 395      ( 01 Jun 2017 06:00 )             41.0       05-31    138  |  98  |  13  ----------------------------<  210<H>  4.4   |  27  |  0.95    Ca    9.6      31 May 2017 05:20  Phos  3.6     05-31  Mg     1.9     05-31        CAPILLARY BLOOD GLUCOSE  200 (31 May 2017 22:35)  81 (31 May 2017 22:03)  64 (31 May 2017 21:45)  145 (31 May 2017 16:20)  216 (31 May 2017 11:52)  264 (31 May 2017 07:55)

## 2017-06-01 NOTE — PROGRESS NOTE ADULT - SUBJECTIVE AND OBJECTIVE BOX
Follow Up:      Inverval History/ROS:Patient is a 32y old  Male who presents with a chief complaint of right foot pain (28 May 2017 01:57)    No fever.   No Events    Allergies    No Known Allergies    Intolerances        ANTIMICROBIALS:  vancomycin  IVPB 1000 every 8 hours  amoxicillin  875 milliGRAM(s)/clavulanate 1 two times a day      OTHER MEDS:  heparin  Injectable 5000Unit(s) SubCutaneous every 8 hours  acetaminophen   Tablet. 650milliGRAM(s) Oral every 6 hours PRN  insulin lispro (HumaLOG) corrective regimen sliding scale  SubCutaneous Before meals and at bedtime  oxyCODONE IR 5milliGRAM(s) Oral every 4 hours PRN  oxyCODONE IR 10milliGRAM(s) Oral every 4 hours PRN  insulin lispro Injectable (HumaLOG) 22Unit(s) SubCutaneous three times a day before meals  insulin glargine Injectable (LANTUS) 33Unit(s) SubCutaneous at bedtime      Vital Signs Last 24 Hrs  T(C): 37.2, Max: 37.3 (06-01 @ 01:40)  T(F): 98.9, Max: 99.2 (06-01 @ 01:40)  HR: 80 (73 - 99)  BP: 129/78 (98/53 - 129/78)  BP(mean): --  RR: 16 (16 - 17)  SpO2: 100% (97% - 100%)    PHYSICAL EXAM:  General: [x ] non-toxic  HEAD/EYES: [ ] PERRL [x ] white sclera [ ] icterus  ENT:  [x ] normal [ ] supple [ ] thrush [ ] pharyngeal exudate  Cardiovascular:   [ ] murmur [x ] normal [ ] PPM/AICD  Respiratory:  [x ] clear to ausculation bilaterally  GI:  [ x] soft, non-tender, normal bowel sounds  :  [ ] méndez [ ] no CVA tenderness   Musculoskeletal:  [ ] no synovitis  Neurologic:  [x ] non-focal exam   Skin:  [x ] no rash  Lymph: [ ] no lymphadenopathy  Psychiatric:  [ ] appropriate affect [x ] alert & oriented  Lines:  [ x] no phlebitis [ ] central line                                13.6   10.25 )-----------( 395      ( 01 Jun 2017 06:00 )             41.0       06-01    135  |  96<L>  |  14  ----------------------------<  320<H>  4.7   |  25  |  0.94    Ca    9.5      01 Jun 2017 06:00  Phos  3.4     06-01  Mg     2.0     06-01            MICROBIOLOGY:Culture Results:   MANY  Routine susceptibility testing not performed as  Streptococcus Grp A/B is susceptible to drug(s) of choice -  Penicillin and Ampicillin  RESULT CALLED TO / READ BACK: JONO MAY/ANTONINA  DATE / TIME CALLED: 05/30/17 8907  CALLED BY: BRAULIO HEWITT (05-28 @ 03:29)      RADIOLOGY:

## 2017-06-01 NOTE — PROGRESS NOTE ADULT - ASSESSMENT
A/P: 32yo male s/p right foot partial 5th ray amputation secondary to gas gangrene   ·	Patient seen and examined   ·	Erythema has improved since yesterday - Cont IV abx per ID.  Clean bone culture pending - PICC vs short term PO abx pending results per ID.  ·	Will need home nursing care - 1/4" plain packing and DSD daily   ·	Packed and dressed with DSD  ·	WBAT in surgical shoe  ·	On DC patient may follow up within 1 week of DC with Dr. Blue - call [phone] for apt.

## 2017-06-01 NOTE — PROGRESS NOTE ADULT - SUBJECTIVE AND OBJECTIVE BOX
HPI:  33 yo male with type 1 diabetes complaining of right foot pain for about one week. He states that he had a blister on the toe and he picked the blister off. It subsequently bleed and developed swelling and pain. He denies nausea or vomiting. He notes fever without chills. (28 May 2017 01:57)    Patient is a 32y old  Male who presents with a chief complaint of right foot pain (28 May 2017 01:57)    Allergies    No Known Allergies    Intolerances      Vital Signs Last 24 Hrs  T(C): 36.6, Max: 37.3 (06-01 @ 01:40)  T(F): 97.9, Max: 99.2 (06-01 @ 01:40)  HR: 84 (73 - 99)  BP: 110/58 (98/53 - 120/67)  BP(mean): --  RR: 16 (16 - 18)  SpO2: 100% (97% - 100%)                        13.6   10.25 )-----------( 395      ( 01 Jun 2017 06:00 )             41.0     06-01    135  |  96<L>  |  14  ----------------------------<  320<H>  4.7   |  25  |  0.94    Ca    9.5      01 Jun 2017 06:00  Phos  3.4     06-01  Mg     2.0     06-01      CAPILLARY BLOOD GLUCOSE  349 (01 Jun 2017 07:13)    MEDICATIONS  (STANDING):  heparin  Injectable 5000Unit(s) SubCutaneous every 8 hours  insulin lispro (HumaLOG) corrective regimen sliding scale  SubCutaneous Before meals and at bedtime  vancomycin  IVPB 1000milliGRAM(s) IV Intermittent every 8 hours  insulin lispro Injectable (HumaLOG) 22Unit(s) SubCutaneous three times a day before meals  insulin glargine Injectable (LANTUS) 33Unit(s) SubCutaneous at bedtime  amoxicillin  875 milliGRAM(s)/clavulanate 1Tablet(s) Oral two times a day    MEDICATIONS  (PRN):  acetaminophen   Tablet. 650milliGRAM(s) Oral every 6 hours PRN Mild Pain (1 - 3)  oxyCODONE IR 5milliGRAM(s) Oral every 4 hours PRN Moderate Pain (4 - 6)  oxyCODONE IR 10milliGRAM(s) Oral every 4 hours PRN Severe Pain (7 - 10)    PAST MEDICAL & SURGICAL HISTORY:  Diabetic retinopathy  Diabetes  No significant past surgical history        GISSEL: Right foot surgical site open with granular base, mild sangenous drainage, no purulence, no malodor, no active bleeding or evidence of hematoma, plantar sutures intact, mild surrounding erythema to mid foot and slight edema.  Erythema much improved since yesterday.   Surrounding CFT < 3 seconds  Pedal pulses 2/4 bilaterally

## 2017-06-02 VITALS
SYSTOLIC BLOOD PRESSURE: 116 MMHG | OXYGEN SATURATION: 99 % | HEART RATE: 78 BPM | DIASTOLIC BLOOD PRESSURE: 72 MMHG | TEMPERATURE: 98 F

## 2017-06-02 LAB
BACTERIA WND CULT: SIGNIFICANT CHANGE UP
BUN SERPL-MCNC: 19 MG/DL — SIGNIFICANT CHANGE UP (ref 7–23)
CALCIUM SERPL-MCNC: 9.5 MG/DL — SIGNIFICANT CHANGE UP (ref 8.4–10.5)
CHLORIDE SERPL-SCNC: 96 MMOL/L — LOW (ref 98–107)
CO2 SERPL-SCNC: 23 MMOL/L — SIGNIFICANT CHANGE UP (ref 22–31)
CREAT SERPL-MCNC: 0.97 MG/DL — SIGNIFICANT CHANGE UP (ref 0.5–1.3)
GLUCOSE SERPL-MCNC: 199 MG/DL — HIGH (ref 70–99)
HCT VFR BLD CALC: 41.7 % — SIGNIFICANT CHANGE UP (ref 39–50)
HGB BLD-MCNC: 13.8 G/DL — SIGNIFICANT CHANGE UP (ref 13–17)
MCHC RBC-ENTMCNC: 28.4 PG — SIGNIFICANT CHANGE UP (ref 27–34)
MCHC RBC-ENTMCNC: 33.1 % — SIGNIFICANT CHANGE UP (ref 32–36)
MCV RBC AUTO: 85.8 FL — SIGNIFICANT CHANGE UP (ref 80–100)
PLATELET # BLD AUTO: 389 K/UL — SIGNIFICANT CHANGE UP (ref 150–400)
PMV BLD: 9 FL — SIGNIFICANT CHANGE UP (ref 7–13)
POTASSIUM SERPL-MCNC: 4.5 MMOL/L — SIGNIFICANT CHANGE UP (ref 3.5–5.3)
POTASSIUM SERPL-SCNC: 4.5 MMOL/L — SIGNIFICANT CHANGE UP (ref 3.5–5.3)
RBC # BLD: 4.86 M/UL — SIGNIFICANT CHANGE UP (ref 4.2–5.8)
RBC # FLD: 12.6 % — SIGNIFICANT CHANGE UP (ref 10.3–14.5)
SODIUM SERPL-SCNC: 136 MMOL/L — SIGNIFICANT CHANGE UP (ref 135–145)
WBC # BLD: 12.95 K/UL — HIGH (ref 3.8–10.5)
WBC # FLD AUTO: 12.95 K/UL — HIGH (ref 3.8–10.5)

## 2017-06-02 RX ORDER — ACETAMINOPHEN 500 MG
2 TABLET ORAL
Qty: 0 | Refills: 0 | COMMUNITY
Start: 2017-06-02

## 2017-06-02 RX ORDER — OXYCODONE HYDROCHLORIDE 5 MG/1
1 TABLET ORAL
Qty: 12 | Refills: 0 | OUTPATIENT
Start: 2017-06-02 | End: 2017-06-04

## 2017-06-02 RX ADMIN — HEPARIN SODIUM 5000 UNIT(S): 5000 INJECTION INTRAVENOUS; SUBCUTANEOUS at 07:04

## 2017-06-02 RX ADMIN — Medication 100 MILLIGRAM(S): at 07:04

## 2017-06-02 RX ADMIN — Medication 22 UNIT(S): at 12:30

## 2017-06-02 RX ADMIN — Medication 1 TABLET(S): at 07:05

## 2017-06-02 RX ADMIN — Medication 6: at 12:30

## 2017-06-02 RX ADMIN — HEPARIN SODIUM 5000 UNIT(S): 5000 INJECTION INTRAVENOUS; SUBCUTANEOUS at 14:59

## 2017-06-02 NOTE — DISCHARGE NOTE ADULT - CARE PLAN
Principal Discharge DX:	Gas gangrene of foot  Goal:	s/p excisional OR debridement, local wound care with antibiotics  Instructions for follow-up, activity and diet:	FOLLOW UP - please follow up Dr. Tg Blue within 1 week of discharge.  [phone] Please call within 1 week for appointment  WOUND CARE - please have home care nursing change dressing daily. Please back 1/4inch plain packing to right foot dorsal wound and dress with 4x4 gauzes ABD pad and Kerlix.   Antibiotics- please finish two week of PO antibiotics Augmentin and Doxy per ID recommendation  Weight bearing- weight bearing as tolerated heel weight bearing in surgical shoe Principal Discharge DX:	Gas gangrene of foot  Goal:	s/p excisional OR debridement, local wound care with antibiotics  Instructions for follow-up, activity and diet:	FOLLOW UP - please follow up Dr. Tg Blue within 1 week of discharge.  [phone] Please call within 1 week for appointment  WOUND CARE - please have home care nursing change dressing daily. Please back 1/4inch plain packing to right foot dorsal wound and dress with 4x4 gauzes ABD pad and Kerlix.   Antibiotics- please finish two week of PO antibiotics Augmentin and Doxy per ID recommendation  Weight bearing- weight bearing as tolerated heel weight bearing in surgical shoe Principal Discharge DX:	Gas gangrene of foot  Goal:	s/p excisional OR debridement, local wound care with antibiotics  Instructions for follow-up, activity and diet:	FOLLOW UP   1) please follow up Dr. Tg Blue within 1 week of discharge.  [phone] Please call within 1 week for appointment  2) Please follow-up with Dr. Perkins (vascular surgeon) in 1-2 weeks. Please call phone number below to schedule an appointment.   WOUND CARE - please have home care nursing change dressing daily. Please back 1/4inch plain packing to right foot dorsal wound and dress with 4x4 gauzes ABD pad and Kerlix.   Antibiotics- please finish two week of PO antibiotics Augmentin and Doxy per ID recommendation  Weight bearing- weight bearing as tolerated heel weight bearing in surgical shoe  DIET: Return to your usual diet.  BATHING: Please do not submerge wound underwater. You may shower and/or sponge bathe.  NOTIFY YOUR SURGEON IF: You have any bleeding that does not stop, any pus draining from your wound, any fever (over 100.4 F) or chills, persistent nausea/vomiting, persistent diarrhea, or if your pain is not controlled on your discharge pain medications. Principal Discharge DX:	Gas gangrene of foot  Goal:	s/p excisional OR debridement, local wound care with antibiotics  Instructions for follow-up, activity and diet:	FOLLOW UP   1) please follow up Dr. Tg Blue within 1 week of discharge.  [phone] Please call within 1 week for appointment  2) Please follow-up with Dr. Perkins (vascular surgeon) in 1-2 weeks. Please call phone number below to schedule an appointment.   3) please follow-up with your primary medical doctor regarding your hospitalization and your diabetes.   WOUND CARE - please have home care nursing change dressing daily. Please back 1/4inch plain packing to right foot dorsal wound and dress with 4x4 gauzes ABD pad and Kerlix.   Antibiotics- please finish two week of PO antibiotics Augmentin and Doxy per ID recommendation  Weight bearing- weight bearing as tolerated heel weight bearing in surgical shoe  DIET: Return to your usual diet.  BATHING: Please do not submerge wound underwater. You may shower and/or sponge bathe.  NOTIFY YOUR SURGEON IF: You have any bleeding that does not stop, any pus draining from your wound, any fever (over 100.4 F) or chills, persistent nausea/vomiting, persistent diarrhea, or if your pain is not controlled on your discharge pain medications. Principal Discharge DX:	Gas gangrene of foot  Goal:	s/p excisional OR debridement, local wound care with antibiotics  Instructions for follow-up, activity and diet:	FOLLOW UP   1) please follow up Dr. Tg Blue within 1 week of discharge.  [phone] Please call within 1 week for appointment  2) Please follow-up with Dr. Perkins (vascular surgeon) in 1-2 weeks. Please call phone number below to schedule an appointment.   3) please follow-up with your primary medical doctor regarding your hospitalization and your diabetes.   WOUND CARE - please have home care nursing change dressing daily. Please back 1/4inch plain packing to right foot dorsal wound and dress with 4x4 gauzes ABD pad and Kerlix.   Antibiotics- please finish two week of PO antibiotics Augmentin and Doxy per ID recommendation  Weight bearing- weight bearing as tolerated heel weight bearing in surgical shoe  DIET: Return to your usual diet.  BATHING: Please do not submerge wound underwater. You may shower and/or sponge bathe.  NOTIFY YOUR SURGEON IF: You have any bleeding that does not stop, any pus draining from your wound, any fever (over 100.4 F) or chills, persistent nausea/vomiting, persistent diarrhea, or if your pain is not controlled on your discharge pain medications. Principal Discharge DX:	Gas gangrene of foot  Goal:	s/p excisional OR debridement, local wound care with antibiotics  Instructions for follow-up, activity and diet:	FOLLOW UP   1) please follow up Dr. Tg Blue within 1 week of discharge.  [phone] Please call within 1 week for appointment  2) Please follow-up with Dr. Perkins (vascular surgeon) in 1-2 weeks. Please call phone number below to schedule an appointment.   3) please follow-up with your primary medical doctor regarding your hospitalization and your diabetes.   WOUND CARE - please have home care nursing change dressing daily. Please back 1/4inch plain packing to right foot dorsal wound and dress with 4x4 gauzes ABD pad and Kerlix.   Antibiotics- please finish two week of PO antibiotics Augmentin and Doxy per ID recommendation  Weight bearing- weight bearing as tolerated heel weight bearing in surgical shoe  DIET: Return to your usual diet.  BATHING: Please do not submerge wound underwater. You may shower and/or sponge bathe.  NOTIFY YOUR SURGEON IF: You have any bleeding that does not stop, any pus draining from your wound, any fever (over 100.4 F) or chills, persistent nausea/vomiting, persistent diarrhea, or if your pain is not controlled on your discharge pain medications.

## 2017-06-02 NOTE — PROGRESS NOTE ADULT - SUBJECTIVE AND OBJECTIVE BOX
NAD; AAOX 3; PAIN well tolerated    MEDICATIONS  (STANDING):  heparin  Injectable 5000Unit(s) SubCutaneous every 8 hours  insulin lispro (HumaLOG) corrective regimen sliding scale  SubCutaneous Before meals and at bedtime  insulin lispro Injectable (HumaLOG) 22Unit(s) SubCutaneous three times a day before meals  insulin glargine Injectable (LANTUS) 33Unit(s) SubCutaneous at bedtime  amoxicillin  875 milliGRAM(s)/clavulanate 1Tablet(s) Oral two times a day  doxycycline hyclate Capsule 100milliGRAM(s) Oral every 12 hours    MEDICATIONS  (PRN):  acetaminophen   Tablet. 650milliGRAM(s) Oral every 6 hours PRN Mild Pain (1 - 3)  oxyCODONE IR 5milliGRAM(s) Oral every 4 hours PRN Moderate Pain (4 - 6)  oxyCODONE IR 10milliGRAM(s) Oral every 4 hours PRN Severe Pain (7 - 10)      Allergies    No Known Allergies    Intolerances      Vital Signs Last 24 Hrs  T(C): 37.2, Max: 37.2 (06-01 @ 16:48)  T(F): 99, Max: 99 (06-02 @ 07:02)  HR: 77 (73 - 80)  BP: 110/58 (110/58 - 129/78)  BP(mean): --  RR: 15 (15 - 17)  SpO2: 97% (97% - 100%)    GISSEL: surgical site sutures intact, no dehiscence, erythema and swelling resolving; no drainage, no malodor.     I & Os for current day (as of 02 Jun 2017 08:34)  =============================================  IN:    IV PiggyBack: 100 ml    Total IN: 100 ml  ---------------------------------------------  OUT:    Voided: 300 ml    Total OUT: 300 ml  ---------------------------------------------  Total NET: -200 ml      LABS:                        13.8   12.95 )-----------( 389      ( 02 Jun 2017 05:27 )             41.7     06-02    136  |  96<L>  |  19  ----------------------------<  199<H>  4.5   |  23  |  0.97    Ca    9.5      02 Jun 2017 05:27  Phos  3.4     06-01  Mg     2.0     06-01          CAPILLARY BLOOD GLUCOSE  228 (02 Jun 2017 07:11)  143 (01 Jun 2017 22:21)  109 (01 Jun 2017 16:48)  260 (01 Jun 2017 11:43)    Culture - Abscess with Gram Stain (05.28.17 @ 03:29)    -  Linezolid: S 2 ARISTIDES    -  Penicillin: R >8 ARISTIDES    -  Cefazolin: R 16 ARISTIDES    -  Daptomycin: S <=0.5 ARISTIDES    -  Erythromycin: R >4 ARISTIDES    -  Moxifloxacin(Aerobic): S <=0.5 ARISTIDES    -  Rifampin: S <=1 ARISTIDES    -  Trimethoprim/Sulfamethoxazole: S <=0.5/9.5 ARISTIDES    -  Ciprofloxacin: I 2 ARISTIDES    -  Clindamycin: S <=0.5 ARISTIDES    -  Gentamicin: S <=4 ARISTIDES    -  Oxacillin: R >2 ARISTIDES    -  Tetra/Doxy: S <=4 ARISTIDES    -  Vancomycin: S 1 ARISTIDES    Specimen Source: OTHER    Gram Stain Result:   GPCPR Gram Pos Cocci in Pairs  QUANTITY OF BACTERIA SEEN: FEW (2+)  WBC^White Blood Cells  QNTY CELLS IN GRAM STAIN: RARE (1+)    Culture Results:   MANY  Routine susceptibility testing not performed as  Streptococcus Grp A/B is susceptible to drug(s) of choice -  Penicillin and Ampicillin  RESULT CALLED TO / READ BACK: JONO MAY/ANTONINA  DATE / TIME CALLED: 05/30/17 4382  CALLED BY: JEWEL HEWITT    Organism Identification: Staph. aureus *MRSA*  Streptococcus agalactiae Gp B    Organism: Streptococcus agalactiae Gp B    Organism: Staph. aureus *MRSA*  OXICILLIN-RESISTANT staphylococci should be regarded as  RESISTANT to ALL other Beta-Lactams regardless of the  in-vitro results obtained.  These include: ALL  Penicillins, Cephalosporins, Amoxicillin-clavulanic  acid, Ticarcillin-clavulanic acid,  Ampicillin-sulbactam, and Imipenem.    Method Type: MICROSCAN POS COMBO 34        RADIOLOGY & ADDITIONAL TESTS:    Imaging Personally Reviewed:  [ ] YES  [ ] NO    Consultant(s) Notes Reviewed:  [ ] YES  [ ] NO    Care Discussed with Consultants/Other Providers [ ] YES  [ ] NO    Gas gangrene of foot  Diabetic ulcer of foot with fat layer exposed

## 2017-06-02 NOTE — PROGRESS NOTE ADULT - PROBLEM SELECTOR PLAN 1
s/p RF partial 5th ray resection   continue with 1/4'' plain packing, and cover with 4x4 gauze and ally  surgical site flushed and dressing changed  dc planning today  f/u with Dr. Blue within 1 wk of discharge.

## 2017-06-02 NOTE — PROVIDER CONTACT NOTE (OTHER) - BACKGROUND
S/P right foot partial 5th toe amputation
pt s/p r 5th toe amp
pt s/p r toe amp
Pts blood pressure has been in the low 100's. axox4. s/p Right foot partial 5th toe amputation. Pt is not on BP medications. IV abx infusing.

## 2017-06-02 NOTE — PROGRESS NOTE ADULT - ASSESSMENT
32M p/w right 5th toe gangrene s/p open 5th toe amputation by PODs, partial closure (5/31)    - Diet:Reg  - Pain control   - Input and outputs   - DVT ppx   - OOB/incentive spirometry   - Dispo planning 32M p/w right 5th toe gangrene s/p open 5th toe amputation by PODs, partial closure (5/31)    - Diet:Reg  - Pain control   - Input and outputs   - DVT ppx   - OOB/incentive spirometry   - Dispo planning today     C team Surgery   Pager: [phone]   Ori Richards M.D. (PGY-1)

## 2017-06-02 NOTE — DISCHARGE NOTE ADULT - CARE PROVIDER_API CALL
Tg Siddiqui (DPM), Surgery  [address] [place] [id number]  Phone: [phone]  Fax: [phone] Tg Siddiqui (HAWA), Surgery  [address] [address]  Phone: [phone]  Fax: [phone]    Berkley Ceja), Surgery  [address] [place] [address]  Phone: [phone]  Fax: [phone]

## 2017-06-02 NOTE — PROVIDER CONTACT NOTE (OTHER) - ACTION/TREATMENT ORDERED:
None at this time. Continue to monitor pt
Please hold pre-meal insulin
given 2 cups of apple juice with a light snack, provider contacted. will recheck BG in 15 minutes. will continue to monitor
provider contacted, insulin is being held b/c pt has loss of appetite

## 2017-06-02 NOTE — PROGRESS NOTE ADULT - SUBJECTIVE AND OBJECTIVE BOX
SURGERY DAILY PROGRESS NOTE:     Hospital day: 6  Post operative day: 5    Overnight events: No acute events; pain controlled, no nausea/vomiting/headache/dizziness/chest pain/shortness of breath      PHYSICAL EXAM:  GA: NAD  RLE: dressing clean/dry/intact   Pulses: b/l DP palpable SURGERY DAILY PROGRESS NOTE:     Hospital day: 6  Post operative day: 5    Overnight events: No acute events; pain controlled, no nausea/vomiting/headache/dizziness/chest pain/shortness of breath      PHYSICAL EXAM:  GA: NAD  RLE: dressing clean/dry/intact   Pulses: b/l DP palpable    Vital Signs Last 24 Hrs  T(C): 37.2, Max: 37.2 (06-01 @ 16:48)  T(F): 99, Max: 99 (06-02 @ 07:02)  HR: 77 (73 - 80)  BP: 110/58 (110/58 - 129/78)  BP(mean): --  RR: 15 (15 - 17)  SpO2: 97% (97% - 100%)    I&O's Detail    I & Os for current day (as of 02 Jun 2017 08:00)  =============================================  IN:    IV PiggyBack: 100 ml    Total IN: 100 ml  ---------------------------------------------  OUT:    Voided: 300 ml    Total OUT: 300 ml  ---------------------------------------------  Total NET: -200 ml                            13.8   12.95 )-----------( 389      ( 02 Jun 2017 05:27 )             41.7       06-02    136  |  96<L>  |  19  ----------------------------<  199<H>  4.5   |  23  |  0.97    Ca    9.5      02 Jun 2017 05:27  Phos  3.4     06-01  Mg     2.0     06-01        CAPILLARY BLOOD GLUCOSE  228 (02 Jun 2017 07:11)  143 (01 Jun 2017 22:21)  109 (01 Jun 2017 16:48)  260 (01 Jun 2017 11:43)

## 2017-06-02 NOTE — DISCHARGE NOTE ADULT - NS AS ACTIVITY OBS
Do not drive or operate machinery/No Heavy lifting/straining/Walking-Indoors allowed/Walking-Outdoors allowed

## 2017-06-02 NOTE — DISCHARGE NOTE ADULT - MEDICATION SUMMARY - MEDICATIONS TO TAKE
I will START or STAY ON the medications listed below when I get home from the hospital:    acetaminophen 325 mg oral tablet  -- 2 tab(s) by mouth every 6 hours, As needed, Mild Pain (1 - 3)  -- Indication: For Pain    oxyCODONE 5 mg oral tablet  -- 1 tab(s) by mouth every 4 hours, As needed, Moderate Pain (4 - 6) MDD:6 tabs  -- Indication: For Pain    Lantus 100 units/mL subcutaneous solution  -- 30 unit(s) subcutaneous once a day (at bedtime)  -- Indication: For Home medication     NovoLOG 100 units/mL subcutaneous solution  -- 20 unit(s) subcutaneous 2 times a day  -- Indication: For Home medication     glipiZIDE  --  by mouth   -- Indication: For Home medication     doxycycline monohydrate 100 mg oral capsule  -- 1 cap(s) by mouth every 12 hours MDD:2 tabs  -- Indication: For toe gangrene    amoxicillin-clavulanate 875 mg-125 mg oral tablet  -- 1 tab(s) by mouth 2 times a day MDD:2 tabs  -- Indication: For toe gangrene

## 2017-06-02 NOTE — DISCHARGE NOTE ADULT - CARE PROVIDERS DIRECT ADDRESSES
,DirectAddress_Unknown ,DirectAddress_Unknown,[email or web address]

## 2017-06-02 NOTE — PROVIDER CONTACT NOTE (OTHER) - ASSESSMENT
Awake and alert, c/o of feeling dizzy, eating lunch at present
Pt is sleeping at this time. No distress noted. v/s- temp-98.3 HR-78. BP 90/52 Resp-17 100 O2 sat on room air. Provider notified
pt is activley vomiting
pt is lethargic but arouseable. BG dropped to 64

## 2017-06-02 NOTE — DISCHARGE NOTE ADULT - HOSPITAL COURSE
patient admitted from Emergency room 5/27 patient states he developed blister about one week prior to admission. After he deroofed it the blister became an ulcer with increasing pain, swelling and redness. Right foot xray on 5/27 showed gas in the 4th interspace. And patient was taken to OR emergently for right foot partial 5th ray resection left open partially. Wound culture was taken in ED and revealed MRSA infection. EDDI/PVR vascular study showed adequate flow to the lower extremity. on 5/31 the surgical site was delay primarily closed partially with distal packing. Patient will continue with daily dressing change with 1/4'' plain packing and will be discharged with doxy and augmentin 14days. Patient will follow up with Dr. Blue within 1 week of discharge. Patient admitted from Emergency room 5/27 patient states he developed blister about one week prior to admission. After he deroofed it the blister became an ulcer with increasing pain, swelling and redness. Right foot xray on 5/27 showed gas in the 4th interspace. And patient was taken to OR emergently for right foot partial 5th ray resection left open partially. Wound culture was taken in ED and revealed MRSA infection. EDDI/PVR vascular study showed adequate flow to the lower extremity. on 5/31 the surgical site was delay primarily closed partially with distal packing. Patient will continue with daily dressing change with 1/4'' plain packing and will be discharged with doxy and augmentin 14days. Patient will follow up with Dr. Blue within 1 week of discharge.     The patient's pain was initially controlled with IV pain medications and then oral pain medications. When appropriate, we advanced the pt from clears to a regular diet, which was tolerated well. The incisions are clean, dry and intact with appropriate incisional tenderness. Currently, the patient is ambulating, voiding urine, passing stool, tolerating a regular diet, and pain is controlled with oral pain medications. Patient is hemodynamically stable and has met all criteria for discharge.

## 2017-06-02 NOTE — PROVIDER CONTACT NOTE (OTHER) - SITUATION
Pt's FS is 79, due for 20 units of humolag
Pts blood pressure has been decreasing throughout night shift.
pt is having episodes of vomiting
pt is lethargic but arouseable. BG dropped to 64

## 2017-06-02 NOTE — DISCHARGE NOTE ADULT - PATIENT PORTAL LINK FT
“You can access the [facility] Patient Portal, offered by [facility], by registering with the following website: [email or web address]”

## 2017-06-02 NOTE — DISCHARGE NOTE ADULT - HOME CARE AGENCY
[facility] [phone]. Initial visit will be day after discharge home. A nurse will call prior to home visit

## 2017-06-03 LAB — BACTERIA WND CULT: SIGNIFICANT CHANGE UP

## 2017-06-06 LAB — SURGICAL PATHOLOGY STUDY: SIGNIFICANT CHANGE UP

## 2017-06-27 LAB
FUNGUS SPEC QL CULT: SIGNIFICANT CHANGE UP
FUNGUS SPEC QL CULT: SIGNIFICANT CHANGE UP

## 2017-07-01 ENCOUNTER — OUTPATIENT (OUTPATIENT)
Dept: OUTPATIENT SERVICES | Facility: HOSPITAL | Age: 32
LOS: 1 days | End: 2017-07-01
Payer: MEDICAID

## 2017-07-05 ENCOUNTER — INPATIENT (INPATIENT)
Facility: HOSPITAL | Age: 32
LOS: 5 days | Discharge: HOME HEALTH | End: 2017-07-11
Attending: HOSPITALIST | Admitting: HOSPITALIST
Payer: MEDICAID

## 2017-07-05 VITALS
SYSTOLIC BLOOD PRESSURE: 117 MMHG | HEART RATE: 101 BPM | OXYGEN SATURATION: 100 % | RESPIRATION RATE: 20 BRPM | DIASTOLIC BLOOD PRESSURE: 76 MMHG | TEMPERATURE: 98 F

## 2017-07-05 DIAGNOSIS — L03.90 CELLULITIS, UNSPECIFIED: ICD-10-CM

## 2017-07-05 LAB
ALBUMIN SERPL ELPH-MCNC: 4.1 G/DL — SIGNIFICANT CHANGE UP (ref 3.3–5)
ALP SERPL-CCNC: 89 U/L — SIGNIFICANT CHANGE UP (ref 40–120)
ALT FLD-CCNC: 44 U/L — HIGH (ref 4–41)
AST SERPL-CCNC: 42 U/L — HIGH (ref 4–40)
BASE EXCESS BLDV CALC-SCNC: 6 MMOL/L — SIGNIFICANT CHANGE UP
BASOPHILS # BLD AUTO: 0.04 K/UL — SIGNIFICANT CHANGE UP (ref 0–0.2)
BASOPHILS NFR BLD AUTO: 0.3 % — SIGNIFICANT CHANGE UP (ref 0–2)
BILIRUB SERPL-MCNC: 0.6 MG/DL — SIGNIFICANT CHANGE UP (ref 0.2–1.2)
BLOOD GAS VENOUS - CREATININE: 0.66 MG/DL — SIGNIFICANT CHANGE UP (ref 0.5–1.3)
BUN SERPL-MCNC: 11 MG/DL — SIGNIFICANT CHANGE UP (ref 7–23)
CALCIUM SERPL-MCNC: 9.6 MG/DL — SIGNIFICANT CHANGE UP (ref 8.4–10.5)
CHLORIDE BLDV-SCNC: 104 MMOL/L — SIGNIFICANT CHANGE UP (ref 96–108)
CHLORIDE SERPL-SCNC: 98 MMOL/L — SIGNIFICANT CHANGE UP (ref 98–107)
CO2 SERPL-SCNC: 27 MMOL/L — SIGNIFICANT CHANGE UP (ref 22–31)
CREAT SERPL-MCNC: 0.82 MG/DL — SIGNIFICANT CHANGE UP (ref 0.5–1.3)
EOSINOPHIL # BLD AUTO: 0.11 K/UL — SIGNIFICANT CHANGE UP (ref 0–0.5)
EOSINOPHIL NFR BLD AUTO: 0.9 % — SIGNIFICANT CHANGE UP (ref 0–6)
ERYTHROCYTE [SEDIMENTATION RATE] IN BLOOD: 67 MM/HR — HIGH (ref 1–15)
GAS PNL BLDV: 135 MMOL/L — LOW (ref 136–146)
GLUCOSE BLDV-MCNC: 191 — HIGH (ref 70–99)
GLUCOSE SERPL-MCNC: 202 MG/DL — HIGH (ref 70–99)
HCO3 BLDV-SCNC: 27 MMOL/L — SIGNIFICANT CHANGE UP (ref 20–27)
HCT VFR BLD CALC: 44.6 % — SIGNIFICANT CHANGE UP (ref 39–50)
HCT VFR BLDV CALC: 44.8 % — SIGNIFICANT CHANGE UP (ref 39–51)
HGB BLD-MCNC: 14.6 G/DL — SIGNIFICANT CHANGE UP (ref 13–17)
HGB BLDV-MCNC: 14.6 G/DL — SIGNIFICANT CHANGE UP (ref 13–17)
IMM GRANULOCYTES # BLD AUTO: 0.04 # — SIGNIFICANT CHANGE UP
IMM GRANULOCYTES NFR BLD AUTO: 0.3 % — SIGNIFICANT CHANGE UP (ref 0–1.5)
LACTATE BLDV-MCNC: 1.4 MMOL/L — SIGNIFICANT CHANGE UP (ref 0.5–2)
LYMPHOCYTES # BLD AUTO: 17.4 % — SIGNIFICANT CHANGE UP (ref 13–44)
LYMPHOCYTES # BLD AUTO: 2.17 K/UL — SIGNIFICANT CHANGE UP (ref 1–3.3)
MCHC RBC-ENTMCNC: 28 PG — SIGNIFICANT CHANGE UP (ref 27–34)
MCHC RBC-ENTMCNC: 32.7 % — SIGNIFICANT CHANGE UP (ref 32–36)
MCV RBC AUTO: 85.4 FL — SIGNIFICANT CHANGE UP (ref 80–100)
MONOCYTES # BLD AUTO: 0.93 K/UL — HIGH (ref 0–0.9)
MONOCYTES NFR BLD AUTO: 7.4 % — SIGNIFICANT CHANGE UP (ref 2–14)
NEUTROPHILS # BLD AUTO: 9.21 K/UL — HIGH (ref 1.8–7.4)
NEUTROPHILS NFR BLD AUTO: 73.7 % — SIGNIFICANT CHANGE UP (ref 43–77)
NRBC # FLD: 0 — SIGNIFICANT CHANGE UP
PCO2 BLDV: 55 MMHG — HIGH (ref 41–51)
PH BLDV: 7.37 PH — SIGNIFICANT CHANGE UP (ref 7.32–7.43)
PLATELET # BLD AUTO: 275 K/UL — SIGNIFICANT CHANGE UP (ref 150–400)
PMV BLD: 9.6 FL — SIGNIFICANT CHANGE UP (ref 7–13)
PO2 BLDV: 24 MMHG — LOW (ref 35–40)
POTASSIUM BLDV-SCNC: 3.2 MMOL/L — LOW (ref 3.4–4.5)
POTASSIUM SERPL-MCNC: 3.5 MMOL/L — SIGNIFICANT CHANGE UP (ref 3.5–5.3)
POTASSIUM SERPL-SCNC: 3.5 MMOL/L — SIGNIFICANT CHANGE UP (ref 3.5–5.3)
PROT SERPL-MCNC: 8 G/DL — SIGNIFICANT CHANGE UP (ref 6–8.3)
RBC # BLD: 5.22 M/UL — SIGNIFICANT CHANGE UP (ref 4.2–5.8)
RBC # FLD: 12.7 % — SIGNIFICANT CHANGE UP (ref 10.3–14.5)
SAO2 % BLDV: 39.3 % — LOW (ref 60–85)
SODIUM SERPL-SCNC: 140 MMOL/L — SIGNIFICANT CHANGE UP (ref 135–145)
WBC # BLD: 12.5 K/UL — HIGH (ref 3.8–10.5)
WBC # FLD AUTO: 12.5 K/UL — HIGH (ref 3.8–10.5)

## 2017-07-05 PROCEDURE — 73630 X-RAY EXAM OF FOOT: CPT | Mod: 26,RT

## 2017-07-05 RX ORDER — PIPERACILLIN AND TAZOBACTAM 4; .5 G/20ML; G/20ML
3.38 INJECTION, POWDER, LYOPHILIZED, FOR SOLUTION INTRAVENOUS ONCE
Qty: 0 | Refills: 0 | Status: COMPLETED | OUTPATIENT
Start: 2017-07-05 | End: 2017-07-05

## 2017-07-05 RX ORDER — SODIUM CHLORIDE 9 MG/ML
1000 INJECTION INTRAMUSCULAR; INTRAVENOUS; SUBCUTANEOUS ONCE
Qty: 0 | Refills: 0 | Status: COMPLETED | OUTPATIENT
Start: 2017-07-05 | End: 2017-07-05

## 2017-07-05 RX ORDER — VANCOMYCIN HCL 1 G
1000 VIAL (EA) INTRAVENOUS ONCE
Qty: 0 | Refills: 0 | Status: COMPLETED | OUTPATIENT
Start: 2017-07-05 | End: 2017-07-05

## 2017-07-05 RX ADMIN — PIPERACILLIN AND TAZOBACTAM 200 GRAM(S): 4; .5 INJECTION, POWDER, LYOPHILIZED, FOR SOLUTION INTRAVENOUS at 23:07

## 2017-07-05 RX ADMIN — Medication 250 MILLIGRAM(S): at 21:20

## 2017-07-05 RX ADMIN — SODIUM CHLORIDE 1000 MILLILITER(S): 9 INJECTION INTRAMUSCULAR; INTRAVENOUS; SUBCUTANEOUS at 21:20

## 2017-07-05 NOTE — ED PROVIDER NOTE - OBJECTIVE STATEMENT
32yoM hx of DM and recent right 5th toe amputation 2/2 osteo pw increased drainage, redness and swelling in his wound site worsening for past 3 days. pt also subjective chills and fevers last night. denies ab pain, nausea, vomiting, diarrhea or other issues.  Dr. Blue podiatry

## 2017-07-05 NOTE — ED ADULT NURSE NOTE - CHIEF COMPLAINT QUOTE
Pt states had right foot toe amputated end of may. pt states had suture removed about 3 weeks ago  then last friday had toe re stitched. Pt today has increased drainage with odor. and co chills. Pts finger 91.

## 2017-07-05 NOTE — ED PROVIDER NOTE - PROGRESS NOTE DETAILS
podiatry bedside, cleaning and irrigating. recommend IV antibiotics. will admit to medicine. discussed with Dr. Alexis, will admit. Mar Paged.

## 2017-07-05 NOTE — ED PROVIDER NOTE - ATTENDING CONTRIBUTION TO CARE
32yoM hx of DM and recent right 5th toe amputation 2/2 osteo pw increased drainage, redness and swelling in his wound site worsening for past 3 days. pt also subjective chills and fevers last night. denies ab pain, nausea, vomiting, diarrhea or other issues. On exam: afebrile, R foot + pulse, sensation intact, warm, area of amputated 5th toe, red swollen tender. No crepitus. Exam otherwise unremarkable. Plan: labs cultures xrays IV vanco and zosyn, consult podiatry

## 2017-07-05 NOTE — ED ADULT NURSE NOTE - OBJECTIVE STATEMENT
Facilitator RN: Pt received in spot 15. Alert and oriented x3, ambulatory. Pt states he has his right small toe amputated at the end of May for diabetic infection. Stiches removed x 3 weeks ago. Co fevers and chills since yesterday. Co light red malodorous drainage from surgical site today. Labs sent. BC x 2 sent. IV placed.

## 2017-07-06 DIAGNOSIS — L03.90 CELLULITIS, UNSPECIFIED: ICD-10-CM

## 2017-07-06 DIAGNOSIS — Z29.9 ENCOUNTER FOR PROPHYLACTIC MEASURES, UNSPECIFIED: ICD-10-CM

## 2017-07-06 DIAGNOSIS — Z98.890 OTHER SPECIFIED POSTPROCEDURAL STATES: Chronic | ICD-10-CM

## 2017-07-06 DIAGNOSIS — E11.65 TYPE 2 DIABETES MELLITUS WITH HYPERGLYCEMIA: ICD-10-CM

## 2017-07-06 DIAGNOSIS — F17.200 NICOTINE DEPENDENCE, UNSPECIFIED, UNCOMPLICATED: ICD-10-CM

## 2017-07-06 DIAGNOSIS — E11.621 TYPE 2 DIABETES MELLITUS WITH FOOT ULCER: ICD-10-CM

## 2017-07-06 DIAGNOSIS — L03.115 CELLULITIS OF RIGHT LOWER LIMB: ICD-10-CM

## 2017-07-06 DIAGNOSIS — Z89.421 ACQUIRED ABSENCE OF OTHER RIGHT TOE(S): Chronic | ICD-10-CM

## 2017-07-06 DIAGNOSIS — R63.8 OTHER SYMPTOMS AND SIGNS CONCERNING FOOD AND FLUID INTAKE: ICD-10-CM

## 2017-07-06 DIAGNOSIS — Z90.49 ACQUIRED ABSENCE OF OTHER SPECIFIED PARTS OF DIGESTIVE TRACT: Chronic | ICD-10-CM

## 2017-07-06 LAB
ALBUMIN SERPL ELPH-MCNC: 3.5 G/DL — SIGNIFICANT CHANGE UP (ref 3.3–5)
ALP SERPL-CCNC: 75 U/L — SIGNIFICANT CHANGE UP (ref 40–120)
ALT FLD-CCNC: 37 U/L — SIGNIFICANT CHANGE UP (ref 4–41)
AST SERPL-CCNC: 33 U/L — SIGNIFICANT CHANGE UP (ref 4–40)
BASOPHILS # BLD AUTO: 0.04 K/UL — SIGNIFICANT CHANGE UP (ref 0–0.2)
BASOPHILS NFR BLD AUTO: 0.4 % — SIGNIFICANT CHANGE UP (ref 0–2)
BILIRUB SERPL-MCNC: 0.6 MG/DL — SIGNIFICANT CHANGE UP (ref 0.2–1.2)
BUN SERPL-MCNC: 9 MG/DL — SIGNIFICANT CHANGE UP (ref 7–23)
CALCIUM SERPL-MCNC: 8.9 MG/DL — SIGNIFICANT CHANGE UP (ref 8.4–10.5)
CHLORIDE SERPL-SCNC: 101 MMOL/L — SIGNIFICANT CHANGE UP (ref 98–107)
CO2 SERPL-SCNC: 24 MMOL/L — SIGNIFICANT CHANGE UP (ref 22–31)
CREAT SERPL-MCNC: 0.65 MG/DL — SIGNIFICANT CHANGE UP (ref 0.5–1.3)
EOSINOPHIL # BLD AUTO: 0.3 K/UL — SIGNIFICANT CHANGE UP (ref 0–0.5)
EOSINOPHIL NFR BLD AUTO: 2.7 % — SIGNIFICANT CHANGE UP (ref 0–6)
GLUCOSE SERPL-MCNC: 61 MG/DL — LOW (ref 70–99)
GRAM STN SPEC: SIGNIFICANT CHANGE UP
HBA1C BLD-MCNC: 9 % — HIGH (ref 4–5.6)
HCT VFR BLD CALC: 38.7 % — LOW (ref 39–50)
HGB BLD-MCNC: 12.7 G/DL — LOW (ref 13–17)
IMM GRANULOCYTES # BLD AUTO: 0.03 # — SIGNIFICANT CHANGE UP
IMM GRANULOCYTES NFR BLD AUTO: 0.3 % — SIGNIFICANT CHANGE UP (ref 0–1.5)
LYMPHOCYTES # BLD AUTO: 2.46 K/UL — SIGNIFICANT CHANGE UP (ref 1–3.3)
LYMPHOCYTES # BLD AUTO: 22.4 % — SIGNIFICANT CHANGE UP (ref 13–44)
MAGNESIUM SERPL-MCNC: 1.7 MG/DL — SIGNIFICANT CHANGE UP (ref 1.6–2.6)
MCHC RBC-ENTMCNC: 27.5 PG — SIGNIFICANT CHANGE UP (ref 27–34)
MCHC RBC-ENTMCNC: 32.8 % — SIGNIFICANT CHANGE UP (ref 32–36)
MCV RBC AUTO: 83.9 FL — SIGNIFICANT CHANGE UP (ref 80–100)
MONOCYTES # BLD AUTO: 1.03 K/UL — HIGH (ref 0–0.9)
MONOCYTES NFR BLD AUTO: 9.4 % — SIGNIFICANT CHANGE UP (ref 2–14)
NEUTROPHILS # BLD AUTO: 7.13 K/UL — SIGNIFICANT CHANGE UP (ref 1.8–7.4)
NEUTROPHILS NFR BLD AUTO: 64.8 % — SIGNIFICANT CHANGE UP (ref 43–77)
NRBC # FLD: 0 — SIGNIFICANT CHANGE UP
PHOSPHATE SERPL-MCNC: 2.8 MG/DL — SIGNIFICANT CHANGE UP (ref 2.5–4.5)
PLATELET # BLD AUTO: 261 K/UL — SIGNIFICANT CHANGE UP (ref 150–400)
PMV BLD: 9.8 FL — SIGNIFICANT CHANGE UP (ref 7–13)
POTASSIUM SERPL-MCNC: 3.3 MMOL/L — LOW (ref 3.5–5.3)
POTASSIUM SERPL-SCNC: 3.3 MMOL/L — LOW (ref 3.5–5.3)
PROT SERPL-MCNC: 6.9 G/DL — SIGNIFICANT CHANGE UP (ref 6–8.3)
RBC # BLD: 4.61 M/UL — SIGNIFICANT CHANGE UP (ref 4.2–5.8)
RBC # FLD: 13 % — SIGNIFICANT CHANGE UP (ref 10.3–14.5)
SODIUM SERPL-SCNC: 141 MMOL/L — SIGNIFICANT CHANGE UP (ref 135–145)
SPECIMEN SOURCE: SIGNIFICANT CHANGE UP
TSH SERPL-MCNC: 2.89 UIU/ML — SIGNIFICANT CHANGE UP (ref 0.27–4.2)
WBC # BLD: 10.99 K/UL — HIGH (ref 3.8–10.5)
WBC # FLD AUTO: 10.99 K/UL — HIGH (ref 3.8–10.5)

## 2017-07-06 PROCEDURE — 99222 1ST HOSP IP/OBS MODERATE 55: CPT | Mod: GC

## 2017-07-06 PROCEDURE — 99233 SBSQ HOSP IP/OBS HIGH 50: CPT

## 2017-07-06 PROCEDURE — 99223 1ST HOSP IP/OBS HIGH 75: CPT

## 2017-07-06 RX ORDER — OXYCODONE HYDROCHLORIDE 5 MG/1
10 TABLET ORAL EVERY 4 HOURS
Qty: 0 | Refills: 0 | Status: DISCONTINUED | OUTPATIENT
Start: 2017-07-06 | End: 2017-07-11

## 2017-07-06 RX ORDER — LACTOBACILLUS ACIDOPHILUS 100MM CELL
1 CAPSULE ORAL DAILY
Qty: 0 | Refills: 0 | Status: DISCONTINUED | OUTPATIENT
Start: 2017-07-06 | End: 2017-07-11

## 2017-07-06 RX ORDER — ENOXAPARIN SODIUM 100 MG/ML
40 INJECTION SUBCUTANEOUS EVERY 24 HOURS
Qty: 0 | Refills: 0 | Status: DISCONTINUED | OUTPATIENT
Start: 2017-07-06 | End: 2017-07-06

## 2017-07-06 RX ORDER — VANCOMYCIN HCL 1 G
1000 VIAL (EA) INTRAVENOUS EVERY 12 HOURS
Qty: 0 | Refills: 0 | Status: DISCONTINUED | OUTPATIENT
Start: 2017-07-06 | End: 2017-07-08

## 2017-07-06 RX ORDER — DEXTROSE 50 % IN WATER 50 %
25 SYRINGE (ML) INTRAVENOUS ONCE
Qty: 0 | Refills: 0 | Status: DISCONTINUED | OUTPATIENT
Start: 2017-07-06 | End: 2017-07-11

## 2017-07-06 RX ORDER — GLUCAGON INJECTION, SOLUTION 0.5 MG/.1ML
1 INJECTION, SOLUTION SUBCUTANEOUS ONCE
Qty: 0 | Refills: 0 | Status: DISCONTINUED | OUTPATIENT
Start: 2017-07-06 | End: 2017-07-11

## 2017-07-06 RX ORDER — INSULIN GLARGINE 100 [IU]/ML
15 INJECTION, SOLUTION SUBCUTANEOUS AT BEDTIME
Qty: 0 | Refills: 0 | Status: DISCONTINUED | OUTPATIENT
Start: 2017-07-06 | End: 2017-07-06

## 2017-07-06 RX ORDER — PIPERACILLIN AND TAZOBACTAM 4; .5 G/20ML; G/20ML
3.38 INJECTION, POWDER, LYOPHILIZED, FOR SOLUTION INTRAVENOUS EVERY 8 HOURS
Qty: 0 | Refills: 0 | Status: DISCONTINUED | OUTPATIENT
Start: 2017-07-06 | End: 2017-07-09

## 2017-07-06 RX ORDER — OXYCODONE HYDROCHLORIDE 5 MG/1
5 TABLET ORAL EVERY 4 HOURS
Qty: 0 | Refills: 0 | Status: DISCONTINUED | OUTPATIENT
Start: 2017-07-06 | End: 2017-07-11

## 2017-07-06 RX ORDER — DEXTROSE 50 % IN WATER 50 %
25 SYRINGE (ML) INTRAVENOUS ONCE
Qty: 0 | Refills: 0 | Status: COMPLETED | OUTPATIENT
Start: 2017-07-06 | End: 2017-07-06

## 2017-07-06 RX ORDER — SODIUM CHLORIDE 9 MG/ML
1000 INJECTION, SOLUTION INTRAVENOUS
Qty: 0 | Refills: 0 | Status: DISCONTINUED | OUTPATIENT
Start: 2017-07-06 | End: 2017-07-11

## 2017-07-06 RX ORDER — INSULIN LISPRO 100/ML
VIAL (ML) SUBCUTANEOUS AT BEDTIME
Qty: 0 | Refills: 0 | Status: DISCONTINUED | OUTPATIENT
Start: 2017-07-06 | End: 2017-07-11

## 2017-07-06 RX ORDER — ACETAMINOPHEN 500 MG
650 TABLET ORAL EVERY 6 HOURS
Qty: 0 | Refills: 0 | Status: DISCONTINUED | OUTPATIENT
Start: 2017-07-06 | End: 2017-07-11

## 2017-07-06 RX ORDER — INSULIN LISPRO 100/ML
20 VIAL (ML) SUBCUTANEOUS
Qty: 0 | Refills: 0 | Status: DISCONTINUED | OUTPATIENT
Start: 2017-07-06 | End: 2017-07-06

## 2017-07-06 RX ORDER — INSULIN GLARGINE 100 [IU]/ML
30 INJECTION, SOLUTION SUBCUTANEOUS AT BEDTIME
Qty: 0 | Refills: 0 | Status: DISCONTINUED | OUTPATIENT
Start: 2017-07-06 | End: 2017-07-06

## 2017-07-06 RX ORDER — DEXTROSE 50 % IN WATER 50 %
1 SYRINGE (ML) INTRAVENOUS ONCE
Qty: 0 | Refills: 0 | Status: DISCONTINUED | OUTPATIENT
Start: 2017-07-06 | End: 2017-07-11

## 2017-07-06 RX ORDER — DEXTROSE 50 % IN WATER 50 %
12.5 SYRINGE (ML) INTRAVENOUS ONCE
Qty: 0 | Refills: 0 | Status: DISCONTINUED | OUTPATIENT
Start: 2017-07-06 | End: 2017-07-11

## 2017-07-06 RX ORDER — INSULIN GLARGINE 100 [IU]/ML
25 INJECTION, SOLUTION SUBCUTANEOUS AT BEDTIME
Qty: 0 | Refills: 0 | Status: DISCONTINUED | OUTPATIENT
Start: 2017-07-06 | End: 2017-07-07

## 2017-07-06 RX ORDER — INSULIN LISPRO 100/ML
8 VIAL (ML) SUBCUTANEOUS
Qty: 0 | Refills: 0 | Status: DISCONTINUED | OUTPATIENT
Start: 2017-07-06 | End: 2017-07-07

## 2017-07-06 RX ORDER — INSULIN LISPRO 100/ML
VIAL (ML) SUBCUTANEOUS
Qty: 0 | Refills: 0 | Status: DISCONTINUED | OUTPATIENT
Start: 2017-07-06 | End: 2017-07-11

## 2017-07-06 RX ORDER — POTASSIUM CHLORIDE 20 MEQ
40 PACKET (EA) ORAL ONCE
Qty: 0 | Refills: 0 | Status: COMPLETED | OUTPATIENT
Start: 2017-07-06 | End: 2017-07-06

## 2017-07-06 RX ORDER — VANCOMYCIN HCL 1 G
1000 VIAL (EA) INTRAVENOUS EVERY 24 HOURS
Qty: 0 | Refills: 0 | Status: DISCONTINUED | OUTPATIENT
Start: 2017-07-06 | End: 2017-07-06

## 2017-07-06 RX ADMIN — Medication 1 TABLET(S): at 15:52

## 2017-07-06 RX ADMIN — PIPERACILLIN AND TAZOBACTAM 25 GRAM(S): 4; .5 INJECTION, POWDER, LYOPHILIZED, FOR SOLUTION INTRAVENOUS at 22:05

## 2017-07-06 RX ADMIN — Medication 4: at 12:58

## 2017-07-06 RX ADMIN — Medication 25 GRAM(S): at 06:54

## 2017-07-06 RX ADMIN — PIPERACILLIN AND TAZOBACTAM 25 GRAM(S): 4; .5 INJECTION, POWDER, LYOPHILIZED, FOR SOLUTION INTRAVENOUS at 06:06

## 2017-07-06 RX ADMIN — OXYCODONE HYDROCHLORIDE 5 MILLIGRAM(S): 5 TABLET ORAL at 04:22

## 2017-07-06 RX ADMIN — SODIUM CHLORIDE 75 MILLILITER(S): 9 INJECTION, SOLUTION INTRAVENOUS at 06:15

## 2017-07-06 RX ADMIN — ENOXAPARIN SODIUM 40 MILLIGRAM(S): 100 INJECTION SUBCUTANEOUS at 12:57

## 2017-07-06 RX ADMIN — Medication 2: at 22:04

## 2017-07-06 RX ADMIN — INSULIN GLARGINE 25 UNIT(S): 100 INJECTION, SOLUTION SUBCUTANEOUS at 22:05

## 2017-07-06 RX ADMIN — Medication: at 17:28

## 2017-07-06 RX ADMIN — Medication 8 UNIT(S): at 18:33

## 2017-07-06 RX ADMIN — PIPERACILLIN AND TAZOBACTAM 25 GRAM(S): 4; .5 INJECTION, POWDER, LYOPHILIZED, FOR SOLUTION INTRAVENOUS at 15:51

## 2017-07-06 RX ADMIN — Medication: at 09:43

## 2017-07-06 RX ADMIN — OXYCODONE HYDROCHLORIDE 5 MILLIGRAM(S): 5 TABLET ORAL at 05:30

## 2017-07-06 RX ADMIN — Medication 40 MILLIEQUIVALENT(S): at 12:58

## 2017-07-06 NOTE — H&P ADULT - FAMILY HISTORY
Father  Still living? Unknown  Family history of diabetes mellitus, Age at diagnosis: Age Unknown     Mother  Still living? Unknown  Family history of diabetes mellitus, Age at diagnosis: Age Unknown     Grandparent  Still living? Unknown  Family history of diabetes mellitus, Age at diagnosis: Age Unknown

## 2017-07-06 NOTE — H&P ADULT - PROBLEM SELECTOR PLAN 2
- diagnosed ~ age 7 years old.  FH of DM (parents, grandparent)  - hyperglycemic to 202 on CMP (but subsequent FS reportedly 91 and 97)  - on Lantus 30 U HS (not given tonight), Humalog 12 units BID, ISS per FS  - f/u HgbA1c in the AM (no previous value for comparison)  - Consistent carb diet  - foot ulcer being managed by Podiatry team

## 2017-07-06 NOTE — PROGRESS NOTE ADULT - SUBJECTIVE AND OBJECTIVE BOX
Patient is a 32y old  Male who presents with a chief complaint of "My foot got reinfected." (06 Jul 2017 00:50)       INTERVAL HPI/OVERNIGHT EVENTS:  Patient seen and evaluated at bedside.  Pt is resting comfortable in NAD. Denies N/V/F/C.  No acute overnight events.    Allergies    No Known Allergies    Intolerances        Vital Signs Last 24 Hrs  T(C): 36.8 (05 Jul 2017 17:50), Max: 36.8 (05 Jul 2017 17:50)  T(F): 98.3 (05 Jul 2017 17:50), Max: 98.3 (05 Jul 2017 17:50)  HR: 85 (06 Jul 2017 05:47) (85 - 101)  BP: 117/74 (06 Jul 2017 05:47) (116/59 - 136/88)  BP(mean): --  RR: 14 (06 Jul 2017 05:47) (14 - 20)  SpO2: 98% (06 Jul 2017 05:47) (98% - 100%)    LABS:                        12.7   10.99 )-----------( 261      ( 06 Jul 2017 06:05 )             38.7     07-06    141  |  101  |  9   ----------------------------<  61<L>  3.3<L>   |  24  |  0.65    Ca    8.9      06 Jul 2017 06:05  Phos  2.8     07-06  Mg     1.7     07-06    TPro  6.9  /  Alb  3.5  /  TBili  0.6  /  DBili  x   /  AST  33  /  ALT  37  /  AlkPhos  75  07-06        CAPILLARY BLOOD GLUCOSE  155 (06 Jul 2017 07:25)  69 (06 Jul 2017 05:47)  97 (06 Jul 2017 02:17)  91 (05 Jul 2017 17:50)          Lower Extremity Physical Exam:  Vasular: Pedal pulses palpable, B/L.   Neuro: Epicritic sensation diminished to the level of the foot, B/L.  Musculoskeletal/Ortho: S/p 5th ray amp, R foot  Skin:  R sx incision open, no drainage, no malodor, probes to bone and tunnels proximally    RADIOLOGY & ADDITIONAL TESTS:  7/5 No gas on xray as read by resident Bianca Donnelly

## 2017-07-06 NOTE — H&P ADULT - PSH
H/O abdominal surgery  ~ at age 2 months (reason unknown by patient)  H/O amputation of lesser toe, right  ~ 5th  H/O eye surgery  ~ left, due to Retinopathy  History of appendectomy

## 2017-07-06 NOTE — PROGRESS NOTE ADULT - PROBLEM SELECTOR PLAN 2
Uncontrolled type 2 DM with diabetic foot ulcer   - diagnosed ~ age 7 years old.  FH of DM (parents, grandparent)  - hyperglycemic to 202 on CMP (but subsequent FS reportedly 91 and 97)  - Endocrinology recs appreciated, will place patient on Lantus 25 units qhs and 8 units TID  - A1C 9   - Consistent carb diet  - foot ulcer being managed by Podiatry team

## 2017-07-06 NOTE — PROGRESS NOTE ADULT - ASSESSMENT
31 y/o M w/ R foot surgical site infection  - Pt seen and examined  - appearance improving  - Packed and dressed w/ Dry sterile dressing  - Likely no sx intervention as long as appearance improves over next few days  - Will discuss with attending

## 2017-07-06 NOTE — CONSULT NOTE ADULT - SUBJECTIVE AND OBJECTIVE BOX
Patient is a 32y old  Male who presents with a chief complaint of     HPI: Pt is a 31 y/o M presents w/ infected delayed primary closure site of partial 5th ray amp. Initial sx done on 5/28 for gas 4th interspace. Closure done at office on 6/30. Pt states he noticed it becoming red and swollen last night, and also reports subjective fevers and chills. Been ambulating in sx shoe, recently returned to work where does standing but not a lot of walking. Was given 2 w doxy/Aug on d/c, completed and has been off abx >1 week.      PAST MEDICAL & SURGICAL HISTORY:  Diabetic retinopathy  Diabetes  No significant past surgical history    MEDICATIONS  (STANDING):    MEDICATIONS  (PRN):    Allergies    No Known Allergies    Intolerances    VITALS:    Vital Signs Last 24 Hrs  T(C): 36.8 (05 Jul 2017 17:50), Max: 36.8 (05 Jul 2017 17:50)  T(F): 98.3 (05 Jul 2017 17:50), Max: 98.3 (05 Jul 2017 17:50)  HR: 88 (05 Jul 2017 22:10) (88 - 101)  BP: 116/59 (05 Jul 2017 22:10) (116/59 - 117/76)  BP(mean): --  RR: 16 (05 Jul 2017 22:10) (16 - 20)  SpO2: 100% (05 Jul 2017 22:10) (100% - 100%)    LABS:                        14.6   12.50 )-----------( 275      ( 05 Jul 2017 19:53 )             44.6       07-05    140  |  98  |  11  ----------------------------<  202<H>  3.5   |  27  |  0.82    Ca    9.6      05 Jul 2017 19:53    TPro  8.0  /  Alb  4.1  /  TBili  0.6  /  DBili  x   /  AST  42<H>  /  ALT  44<H>  /  AlkPhos  89  07-05      CAPILLARY BLOOD GLUCOSE  91 (05 Jul 2017 17:50)    LOWER EXTREMITY PHYSICAL EXAM:    Vasular: Pedal pulses palpable, B/L.   Neuro: Epicritic sensation diminished to the level of the foot, B/L.  Musculoskeletal/Ortho: S/p 5th ray amp, R foot  Skin:  R sx incision w/ purulence, streaking prox into dorsal midfoot, toes appear viable, edema and erythema noted    RADIOLOGY & ADDITIONAL STUDIES:  7/5 No gas on xray as read by resident Bianca Donnelly

## 2017-07-06 NOTE — H&P ADULT - NSHPSOCIALHISTORY_GEN_ALL_CORE
SOCIAL HISTORY:  .  Lives with roommates.  [place]  Works as a   No history of alcohol abuse  Active smoker (~ < 0.5 ppd) x 18 years; thinking about quitting, refuses Nicotine patch  No history of illegal drug use/abuse

## 2017-07-06 NOTE — H&P ADULT - PROBLEM SELECTOR PLAN 1
- s/p amputation of right 5th toe in May 2017, with frequent f/u and meticulous personal care, per patient  - s/p suture placement at last draining site on Friday last, per patient  - fevers, shivering/chills, sweating, swelling and pain of right foot - worsening over time  - already seen and wound treated by Podiatry team (appreciated); sutures removed, cleaned and packed, unlikely for surgery - but continued evaluation over next few days  - started on Zosyn and Vancomycin in the ED; continued  - analgesic PRN (Oxycodone 5 mg and 10 mg PRN)  - f/u cultures (abscess and blood) - s/p amputation of right 5th toe in May 2017, with frequent f/u and meticulous personal care, per patient  - s/p suture placement at last draining site on Friday last, per patient  - fevers, shivering/chills, sweating, swelling and pain of right foot - worsening over time  - already seen and wound treated by Podiatry team (appreciated); sutures removed, cleaned and packed, unlikely for surgery - but continued evaluation over next few days  - started on Zosyn and Vancomycin in the ED; continued  - analgesic PRN (Oxycodone 5 mg and 10 mg PRN)  - f/u cultures (abscess and blood)  - re-culture if patient has repeat fever

## 2017-07-06 NOTE — PROGRESS NOTE ADULT - ASSESSMENT
28 year old male, with past history significant for type 2 DM, DM Retinopathy, presented to the ED secondary to worsening pain of the right foot along with swelling and oozing, admitted with sepsis 2/2 to E diabetic foot ulcer with uncontrolled DM.

## 2017-07-06 NOTE — H&P ADULT - SKIN COMMENTS
clean dressing in place to right foot (by Podiatry team) clean dressing in place to right foot (by Podiatry team), discoloration of legs

## 2017-07-06 NOTE — H&P ADULT - ASSESSMENT
28 year old male, with past history significant for Diabetes mellitus, DM Retinopathy, presented to the ED secondary to worsening pain of the right foot along with swelling and oozing.  Reports loss of appetite for some time - cause unknown, and thinks he might have lost weight.  Vital signs upon ED presentation as follows: BP = 117/76, HR = 101, RR = 20, T = 36.8 C (98.3 F), O2 Sat = 100% on RA.  Diagnosed with Cellulitis.  Admitted for further management of 28 year old male, with past history significant for Diabetes mellitus, DM Retinopathy, presented to the ED secondary to worsening pain of the right foot along with swelling and oozing.  Reports loss of appetite for some time - cause unknown, and thinks he might have lost weight.  Vital signs upon ED presentation as follows: BP = 117/76, HR = 101, RR = 20, T = 36.8 C (98.3 F), O2 Sat = 100% on RA.  Diagnosed with Cellulitis.  Admitted for further management of Cellulitis of RLE, Type 2 DM with Foot ulcer, Smoking...

## 2017-07-06 NOTE — CONSULT NOTE ADULT - ASSESSMENT
28 year old male, with past history significant for Diabetes mellitus 2, DM Retinopathy presents with right leg cellulitis, also with uncontrolled DM2

## 2017-07-06 NOTE — PROGRESS NOTE ADULT - PROBLEM SELECTOR PLAN 1
- s/p amputation of right 5th toe in May 2017, with frequent f/u and meticulous personal care, per patient  - s/p suture placement at last draining site on Friday last, per patient  - started on Zosyn and Vancomycin in the ED; continued; check vanco trough before 4th dose   - analgesic PRN (Oxycodone 5 mg and 10 mg PRN)  - f/u cultures (abscess and blood)  - re-culture if patient has repeat fever

## 2017-07-06 NOTE — H&P ADULT - PROBLEM SELECTOR PLAN 3
- actively smoking for the past ~ 18 years; < 0.5 packs QD  - thinking about quitting  - Nicotine patch offered, but refused  - offered smoking cessation literature, but patient declines same - stating he is able to stop on his own  - Continue to encourage smoking cessation (per patient's receptiveness)

## 2017-07-06 NOTE — PROGRESS NOTE ADULT - SUBJECTIVE AND OBJECTIVE BOX
CC: F/u for cellulitis of the RLE    SUBJECTIVE / OVERNIGHT EVENTS: Patient seen and examined. Currently, very tired and sleeping. Denies any acute complaints. No fevers, chills, or pain at present time.     MEDICATIONS  (STANDING):  piperacillin/tazobactam IVPB. 3.375 Gram(s) IV Intermittent every 8 hours  vancomycin  IVPB 1000 milliGRAM(s) IV Intermittent every 24 hours  sodium chloride 0.45%. 1000 milliLiter(s) (75 mL/Hr) IV Continuous <Continuous>  insulin lispro (HumaLOG) corrective regimen sliding scale   SubCutaneous three times a day before meals  insulin lispro (HumaLOG) corrective regimen sliding scale   SubCutaneous at bedtime  dextrose 5%. 1000 milliLiter(s) (50 mL/Hr) IV Continuous <Continuous>  dextrose 50% Injectable 12.5 Gram(s) IV Push once  dextrose 50% Injectable 25 Gram(s) IV Push once  dextrose 50% Injectable 25 Gram(s) IV Push once  enoxaparin Injectable 40 milliGRAM(s) SubCutaneous every 24 hours  lactobacillus acidophilus 1 Tablet(s) Oral daily  insulin glargine Injectable (LANTUS) 15 Unit(s) SubCutaneous at bedtime    MEDICATIONS  (PRN):  acetaminophen   Tablet. 650 milliGRAM(s) Oral every 6 hours PRN Mild Pain (1 - 3)  oxyCODONE    IR 5 milliGRAM(s) Oral every 4 hours PRN Moderate Pain (4 - 6)  oxyCODONE    IR 10 milliGRAM(s) Oral every 4 hours PRN Severe Pain (7 - 10)  dextrose Gel 1 Dose(s) Oral once PRN Blood Glucose LESS THAN 70 milliGRAM(s)/deciliter  glucagon  Injectable 1 milliGRAM(s) IntraMuscular once PRN Glucose LESS THAN 70 milligrams/deciliter      PHYSICAL EXAM:  Vital Signs Last 24 Hrs  T(C): 37.1 (07-06-17 @ 12:54), Max: 37.1 (07-06-17 @ 12:54)  HR: 74 (07-06-17 @ 12:54) (74 - 101)  BP: 149/86 (07-06-17 @ 12:54) (116/59 - 149/86)  RR: 18 (07-06-17 @ 12:54) (14 - 20)  SpO2: 100% (07-06-17 @ 12:54) (98% - 100%)  I&O's Summary    GENERAL: NAD, well-developed  HEAD:  Atraumatic, Normocephalic  CHEST/LUNG: Clear to auscultation bilaterally; No wheeze  HEART: Regular rate and rhythm; No murmurs, rubs, or gallops  ABDOMEN: Soft, Nontender, Nondistended; Bowel sounds present  EXTREMITIES:  No edema  PSYCH: AAOx3  NEUROLOGY: non-focal  SKIN: Unable to unwrap right foot due to patient preferring to sleep at present time     LABS:  CAPILLARY BLOOD GLUCOSE  227 (06 Jul 2017 12:00)  155 (06 Jul 2017 07:25)  69 (06 Jul 2017 05:47)  97 (06 Jul 2017 02:17)  91 (05 Jul 2017 17:50)                     12.7   10.99 )-----------( 261      ( 06 Jul 2017 06:05 )             38.7     07-06    141  |  101  |  9   ----------------------------<  61<L>  3.3<L>   |  24  |  0.65    Ca    8.9      06 Jul 2017 06:05  Phos  2.8     07-06  Mg     1.7     07-06    TPro  6.9  /  Alb  3.5  /  TBili  0.6  /  DBili  x   /  AST  33  /  ALT  37  /  AlkPhos  75  07-06    RADIOLOGY & ADDITIONAL TESTS:    Imaging Personally Reviewed:  Xray Foot AP + Lateral + Oblique, Right (07.05.17 @ 21:00) >  IMPRESSION:  Right foot status post fifth metatarsal amputation without   evidence of soft tissue gas    Consultant(s) Notes Reviewed:  Endocrinology     Care Discussed with Consultants/Other Providers: ADS NP- Discussed starting lispro pre-meal

## 2017-07-06 NOTE — CONSULT NOTE ADULT - SUBJECTIVE AND OBJECTIVE BOX
HPI:  28 year old male, with past history significant for Diabetes mellitus 2, DM Retinopathy, presented to the ED secondary to worsening pain of the right foot along with swelling and oozing, admitted with lower extremity cellulitis.  Patient found to have Hemoglobin of 9.0    PAST MEDICAL & SURGICAL HISTORY:  Diabetic retinopathy  Diabetes: ~ diagnosed at 7 years of age  H/O abdominal surgery: ~ at age 2 months (reason unknown by patient)  History of appendectomy  H/O eye surgery: ~ left, due to Retinopathy  H/O amputation of lesser toe, right: ~ 5th      FAMILY HISTORY:  Family history of diabetes mellitus (Father, Mother, Grandparent): parents, grandmother      Social History:    Outpatient Medications:  acetaminophen 325 mg oral tablet: 2 tab(s) orally every 6 hours, As needed, Mild Pain (1 - 3)  · 	oxyCODONE 5 mg oral tablet: 1 tab(s) orally every 4 hours, As needed, Moderate Pain (4 - 6) MDD:6 tabs  · 	amoxicillin-clavulanate 875 mg-125 mg oral tablet: 1 tab(s) orally 2 times a day MDD:2 tabs  · 	doxycycline monohydrate 100 mg oral capsule: 1 cap(s) orally every 12 hours MDD:2 tabs  · 	Lantus 100 units/mL subcutaneous solution: 30 unit(s) subcutaneous once a day (at bedtime)  · 	NovoLOG 100 units/mL subcutaneous solution: 20 unit(s) subcutaneous 2 times a day  · 	glipiZIDE:  orally     MEDICATIONS  (STANDING):  piperacillin/tazobactam IVPB. 3.375 Gram(s) IV Intermittent every 8 hours  vancomycin  IVPB 1000 milliGRAM(s) IV Intermittent every 24 hours  sodium chloride 0.45%. 1000 milliLiter(s) (75 mL/Hr) IV Continuous <Continuous>  insulin lispro (HumaLOG) corrective regimen sliding scale   SubCutaneous three times a day before meals  insulin lispro (HumaLOG) corrective regimen sliding scale   SubCutaneous at bedtime  dextrose 5%. 1000 milliLiter(s) (50 mL/Hr) IV Continuous <Continuous>  dextrose 50% Injectable 12.5 Gram(s) IV Push once  dextrose 50% Injectable 25 Gram(s) IV Push once  dextrose 50% Injectable 25 Gram(s) IV Push once  enoxaparin Injectable 40 milliGRAM(s) SubCutaneous every 24 hours  lactobacillus acidophilus 1 Tablet(s) Oral daily  insulin glargine Injectable (LANTUS) 15 Unit(s) SubCutaneous at bedtime    MEDICATIONS  (PRN):  acetaminophen   Tablet. 650 milliGRAM(s) Oral every 6 hours PRN Mild Pain (1 - 3)  oxyCODONE    IR 5 milliGRAM(s) Oral every 4 hours PRN Moderate Pain (4 - 6)  oxyCODONE    IR 10 milliGRAM(s) Oral every 4 hours PRN Severe Pain (7 - 10)  dextrose Gel 1 Dose(s) Oral once PRN Blood Glucose LESS THAN 70 milliGRAM(s)/deciliter  glucagon  Injectable 1 milliGRAM(s) IntraMuscular once PRN Glucose LESS THAN 70 milligrams/deciliter      Allergies    No Known Allergies    Intolerances      Review of Systems:  Constitutional: No fever  Eyes: No blurry vision  Neuro: No tremors  HEENT: No pain  Cardiovascular: No chest pain, palpitations  Respiratory: No SOB, no cough  GI: No nausea, vomiting, abdominal pain  : No dysuria  Skin: no rash  Psych: no depression  Endocrine: no polyuria, polydipsia  Hem/lymph: no swelling  Osteoporosis: no fractures    ALL OTHER SYSTEMS REVIEWED AND NEGATIVE    UNABLE TO OBTAIN    PHYSICAL EXAM:  VITALS: T(C): 37.1 (07-06-17 @ 12:54)  T(F): 98.7 (07-06-17 @ 12:54), Max: 98.7 (07-06-17 @ 12:54)  HR: 74 (07-06-17 @ 12:54) (74 - 101)  BP: 149/86 (07-06-17 @ 12:54) (116/59 - 149/86)  RR:  (14 - 20)  SpO2:  (98% - 100%)  Wt(kg): --  GENERAL: NAD, well-groomed, well-developed  EYES: No proptosis, no lid lag, anicteric  HEENT:  Atraumatic, Normocephalic, moist mucous membranes  THYROID: Normal size, no palpable nodules  RESPIRATORY: Clear to auscultation bilaterally; No rales, rhonchi, wheezing  CARDIOVASCULAR: Regular rate and rhythm; No murmurs; no peripheral edema  GI: Soft, nontender, non distended, normal bowel sounds  SKIN: Dry, intact, No rashes or lesions  MUSCULOSKELETAL: Full range of motion, normal strength  NEURO: sensation intact, extraocular movements intact, no tremor  PSYCH: Alert and oriented x 3, normal affect, normal mood  CUSHING'S SIGNS: no striae    CAPILLARY BLOOD GLUCOSE  155 (07-06 @ 07:25)  69 (07-06 @ 05:47)  97 (07-06 @ 02:17)  91 (07-05 @ 17:50)                            12.7   10.99 )-----------( 261      ( 06 Jul 2017 06:05 )             38.7       07-06    141  |  101  |  9   ----------------------------<  61<L>  3.3<L>   |  24  |  0.65    EGFR if : 147  EGFR if non : 127    Ca    8.9      07-06  Mg     1.7     07-06  Phos  2.8     07-06    TPro  6.9  /  Alb  3.5  /  TBili  0.6  /  DBili  x   /  AST  33  /  ALT  37  /  AlkPhos  75  07-06      Thyroid Function Tests:  07-06 @ 06:05 TSH 2.89 FreeT4 -- T3 -- Anti TPO -- Anti Thyroglobulin Ab -- TSI --      Hemoglobin A1C, Whole Blood: 9.0 % <H> [4.0 - 5.6] (07-06-17 @ 06:05) HPI:  28 year old male, with past history significant for Diabetes mellitus 2, DM Retinopathy, presented to the ED with worsening pain of the right foot along with swelling and oozing of the foot, admitted with lower extremity cellulitis.  Patient found to have Hemoglobin of 9.0. Patient states that he was diagnosed with DM at age 6 and has been on insulin since then. States he currently takes novolog 70/30 20 units before meals, sometimes three times a day, and lantus 30 units at bedside. States that he has been on humalog on the past but that it made his glucose drop to low. His doctor then switched him to novolog 70/30. DM has been complicated by retinopathy in the left eye; also reports burning and tingling occasionally in hands and feet. Denies history of nephropathy. For breakfast typically has a wheat roll. For lunch and dinner has rice with chicken. Sometimes does not have lunch. Drinks diet Ice tea. Does not exercise. Denies any recent hypoglycemia, FS usually 100-200. Reports appetite okay.    PAST MEDICAL & SURGICAL HISTORY:  Diabetic retinopathy  Diabetes: ~ diagnosed at 7 years of age  H/O abdominal surgery: ~ at age 2 months (reason unknown by patient)  History of appendectomy  H/O eye surgery: ~ left, due to Retinopathy  H/O amputation of lesser toe, right: ~ 5th      FAMILY HISTORY:  Family history of diabetes mellitus (Father, Mother, Grandparent): parents, grandmother      Social History:  +Cigarette use (1/2 pack a day x 20 years)  No alcohol use  Works as a  in a  [employer]  Originally from [facility]    Outpatient Medications:  acetaminophen 325 mg oral tablet: 2 tab(s) orally every 6 hours, As needed, Mild Pain (1 - 3)  · 	oxyCODONE 5 mg oral tablet: 1 tab(s) orally every 4 hours, As needed, Moderate Pain (4 - 6) MDD:6 tabs  · 	amoxicillin-clavulanate 875 mg-125 mg oral tablet: 1 tab(s) orally 2 times a day MDD:2 tabs  · 	doxycycline monohydrate 100 mg oral capsule: 1 cap(s) orally every 12 hours MDD:2 tabs  · 	Lantus 100 units/mL subcutaneous solution: 30 unit(s) subcutaneous once a day (at bedtime)  · 	NovoLOG 100 units/mL subcutaneous solution: 20 unit(s) subcutaneous 2 times a day  · 	glipiZIDE:  orally     MEDICATIONS  (STANDING):  piperacillin/tazobactam IVPB. 3.375 Gram(s) IV Intermittent every 8 hours  vancomycin  IVPB 1000 milliGRAM(s) IV Intermittent every 24 hours  sodium chloride 0.45%. 1000 milliLiter(s) (75 mL/Hr) IV Continuous <Continuous>  insulin lispro (HumaLOG) corrective regimen sliding scale   SubCutaneous three times a day before meals  insulin lispro (HumaLOG) corrective regimen sliding scale   SubCutaneous at bedtime  dextrose 5%. 1000 milliLiter(s) (50 mL/Hr) IV Continuous <Continuous>  dextrose 50% Injectable 12.5 Gram(s) IV Push once  dextrose 50% Injectable 25 Gram(s) IV Push once  dextrose 50% Injectable 25 Gram(s) IV Push once  enoxaparin Injectable 40 milliGRAM(s) SubCutaneous every 24 hours  lactobacillus acidophilus 1 Tablet(s) Oral daily  insulin glargine Injectable (LANTUS) 15 Unit(s) SubCutaneous at bedtime    MEDICATIONS  (PRN):  acetaminophen   Tablet. 650 milliGRAM(s) Oral every 6 hours PRN Mild Pain (1 - 3)  oxyCODONE    IR 5 milliGRAM(s) Oral every 4 hours PRN Moderate Pain (4 - 6)  oxyCODONE    IR 10 milliGRAM(s) Oral every 4 hours PRN Severe Pain (7 - 10)  dextrose Gel 1 Dose(s) Oral once PRN Blood Glucose LESS THAN 70 milliGRAM(s)/deciliter  glucagon  Injectable 1 milliGRAM(s) IntraMuscular once PRN Glucose LESS THAN 70 milligrams/deciliter      Allergies    No Known Allergies    Intolerances      Review of Systems:  Constitutional: + fever on admission  Eyes: No blurry vision  Neuro: No tremors  HEENT: No pain  Cardiovascular: No chest pain, palpitations  Respiratory: No SOB, no cough  GI: No nausea, vomiting, abdominal pain  : No dysuria  Skin: + swelling and oozing in right foot  Psych: no depression  Endocrine: no polyuria, polydipsia  Hem/lymph: no swelling      ALL OTHER SYSTEMS REVIEWED AND NEGATIVE      PHYSICAL EXAM:  VITALS: T(C): 37.1 (07-06-17 @ 12:54)  T(F): 98.7 (07-06-17 @ 12:54), Max: 98.7 (07-06-17 @ 12:54)  HR: 74 (07-06-17 @ 12:54) (74 - 101)  BP: 149/86 (07-06-17 @ 12:54) (116/59 - 149/86)  RR:  (14 - 20)  SpO2:  (98% - 100%)  Wt(kg): --  GENERAL: NAD, well-groomed, well-developed  EYES: No proptosis, anicteric  HEENT:  Atraumatic, Normocephalic, moist mucous membranes  THYROID: Normal size, no palpable nodules  RESPIRATORY: Clear to auscultation bilaterally; No rales, rhonchi, wheezing  CARDIOVASCULAR: Regular rate and rhythm; No murmurs; no peripheral edema  GI: Soft, nontender, non distended  SKIN: right foot bandaged - clean dry intact  NEURO: sensation intact, extraocular movements intact, no tremor  PSYCH: Alert and oriented x 3, blunted affect, normal mood  CUSHING'S SIGNS: no striae    CAPILLARY BLOOD GLUCOSE  155 (07-06 @ 07:25)  69 (07-06 @ 05:47)  97 (07-06 @ 02:17)  91 (07-05 @ 17:50)                            12.7   10.99 )-----------( 261      ( 06 Jul 2017 06:05 )             38.7       07-06    141  |  101  |  9   ----------------------------<  61<L>  3.3<L>   |  24  |  0.65    EGFR if : 147  EGFR if non : 127    Ca    8.9      07-06  Mg     1.7     07-06  Phos  2.8     07-06    TPro  6.9  /  Alb  3.5  /  TBili  0.6  /  DBili  x   /  AST  33  /  ALT  37  /  AlkPhos  75  07-06      Thyroid Function Tests:  07-06 @ 06:05 TSH 2.89 FreeT4 -- T3 -- Anti TPO -- Anti Thyroglobulin Ab -- TSI --      Hemoglobin A1C, Whole Blood: 9.0 % <H> [4.0 - 5.6] (07-06-17 @ 06:05) HPI:  32 year old male, with past history significant for Diabetes mellitus 2, DM Retinopathy, presented to the ED with worsening pain of the right foot along with swelling and oozing of the foot, admitted with lower extremity cellulitis.  Patient found to have Hemoglobin of 9.0. Patient states that he was diagnosed with DM at age 6 and has been on insulin since then. States he currently takes novolog 70/30 20 units before meals, sometimes three times a day, and lantus 30 units at bedside. States that he has been on humalog on the past but that it made his glucose drop to low. His doctor then switched him to novolog 70/30. DM has been complicated by retinopathy in the left eye; also reports burning and tingling occasionally in hands and feet. Denies history of nephropathy. For breakfast typically has a wheat roll. For lunch and dinner has rice with chicken. Sometimes does not have lunch. Drinks diet Ice tea. Does not exercise. Denies any recent hypoglycemia, FS usually 100-200. Reports appetite okay.    PAST MEDICAL & SURGICAL HISTORY:  Diabetic retinopathy  Diabetes: ~ diagnosed at 7 years of age  H/O abdominal surgery: ~ at age 2 months (reason unknown by patient)  History of appendectomy  H/O eye surgery: ~ left, due to Retinopathy  H/O amputation of lesser toe, right: ~ 5th      FAMILY HISTORY:  Family history of diabetes mellitus (Father, Mother, Grandparent): parents, grandmother      Social History:  +Cigarette use (1/2 pack a day x 20 years)  No alcohol use  Works as a  in a  [employer]  Originally from [facility]    Outpatient Medications:  acetaminophen 325 mg oral tablet: 2 tab(s) orally every 6 hours, As needed, Mild Pain (1 - 3)  · 	oxyCODONE 5 mg oral tablet: 1 tab(s) orally every 4 hours, As needed, Moderate Pain (4 - 6) MDD:6 tabs  · 	amoxicillin-clavulanate 875 mg-125 mg oral tablet: 1 tab(s) orally 2 times a day MDD:2 tabs  · 	doxycycline monohydrate 100 mg oral capsule: 1 cap(s) orally every 12 hours MDD:2 tabs  · 	Lantus 100 units/mL subcutaneous solution: 30 unit(s) subcutaneous once a day (at bedtime)  · 	NovoLOG 100 units/mL subcutaneous solution: 20 unit(s) subcutaneous 2 times a day  · 	glipiZIDE:  orally     MEDICATIONS  (STANDING):  piperacillin/tazobactam IVPB. 3.375 Gram(s) IV Intermittent every 8 hours  vancomycin  IVPB 1000 milliGRAM(s) IV Intermittent every 24 hours  sodium chloride 0.45%. 1000 milliLiter(s) (75 mL/Hr) IV Continuous <Continuous>  insulin lispro (HumaLOG) corrective regimen sliding scale   SubCutaneous three times a day before meals  insulin lispro (HumaLOG) corrective regimen sliding scale   SubCutaneous at bedtime  dextrose 5%. 1000 milliLiter(s) (50 mL/Hr) IV Continuous <Continuous>  dextrose 50% Injectable 12.5 Gram(s) IV Push once  dextrose 50% Injectable 25 Gram(s) IV Push once  dextrose 50% Injectable 25 Gram(s) IV Push once  enoxaparin Injectable 40 milliGRAM(s) SubCutaneous every 24 hours  lactobacillus acidophilus 1 Tablet(s) Oral daily  insulin glargine Injectable (LANTUS) 15 Unit(s) SubCutaneous at bedtime    MEDICATIONS  (PRN):  acetaminophen   Tablet. 650 milliGRAM(s) Oral every 6 hours PRN Mild Pain (1 - 3)  oxyCODONE    IR 5 milliGRAM(s) Oral every 4 hours PRN Moderate Pain (4 - 6)  oxyCODONE    IR 10 milliGRAM(s) Oral every 4 hours PRN Severe Pain (7 - 10)  dextrose Gel 1 Dose(s) Oral once PRN Blood Glucose LESS THAN 70 milliGRAM(s)/deciliter  glucagon  Injectable 1 milliGRAM(s) IntraMuscular once PRN Glucose LESS THAN 70 milligrams/deciliter      Allergies    No Known Allergies    Intolerances      Review of Systems:  Constitutional: + fever on admission  Eyes: No blurry vision  Neuro: No tremors  HEENT: No pain  Cardiovascular: No chest pain, palpitations  Respiratory: No SOB, no cough  GI: No nausea, vomiting, abdominal pain  : No dysuria  Skin: + swelling and oozing in right foot  Psych: no depression  Endocrine: no polyuria, polydipsia  Hem/lymph: no swelling      ALL OTHER SYSTEMS REVIEWED AND NEGATIVE      PHYSICAL EXAM:  VITALS: T(C): 37.1 (07-06-17 @ 12:54)  T(F): 98.7 (07-06-17 @ 12:54), Max: 98.7 (07-06-17 @ 12:54)  HR: 74 (07-06-17 @ 12:54) (74 - 101)  BP: 149/86 (07-06-17 @ 12:54) (116/59 - 149/86)  RR:  (14 - 20)  SpO2:  (98% - 100%)  Wt(kg): --  GENERAL: NAD, well-groomed, well-developed  EYES: No proptosis, anicteric  HEENT:  Atraumatic, Normocephalic, moist mucous membranes  THYROID: Normal size, no palpable nodules  RESPIRATORY: Clear to auscultation bilaterally; No rales, rhonchi, wheezing  CARDIOVASCULAR: Regular rate and rhythm; No murmurs; no peripheral edema  GI: Soft, nontender, non distended  SKIN: right foot bandaged - clean dry intact  NEURO: sensation intact, extraocular movements intact, no tremor  PSYCH: Alert and oriented x 3, blunted affect, normal mood  CUSHING'S SIGNS: no striae    CAPILLARY BLOOD GLUCOSE  155 (07-06 @ 07:25)  69 (07-06 @ 05:47)  97 (07-06 @ 02:17)  91 (07-05 @ 17:50)                            12.7   10.99 )-----------( 261      ( 06 Jul 2017 06:05 )             38.7       07-06    141  |  101  |  9   ----------------------------<  61<L>  3.3<L>   |  24  |  0.65    EGFR if : 147  EGFR if non : 127    Ca    8.9      07-06  Mg     1.7     07-06  Phos  2.8     07-06    TPro  6.9  /  Alb  3.5  /  TBili  0.6  /  DBili  x   /  AST  33  /  ALT  37  /  AlkPhos  75  07-06      Thyroid Function Tests:  07-06 @ 06:05 TSH 2.89 FreeT4 -- T3 -- Anti TPO -- Anti Thyroglobulin Ab -- TSI --      Hemoglobin A1C, Whole Blood: 9.0 % <H> [4.0 - 5.6] (07-06-17 @ 06:05)

## 2017-07-06 NOTE — H&P ADULT - HISTORY OF PRESENT ILLNESS
28 year old male, with past history significant for Diabetes mellitus, DM Retinopathy, presented to the ED secondary to         Vital signs upon ED presentation as follows: BP = 117/76, HR = 101, RR = 20, T = 36.8 C (98.3 F), O2 Sat = 100% on RA.  Diagnosed with Cellulitis.  Prescribed Vancomycin 1 gram IV, Zosyn 3.375 grams IV x one and IVF NS 1 liter bolus.  Already seen by Podiatry team. 28 year old male, with past history significant for Diabetes mellitus, DM Retinopathy, presented to the ED secondary to pain        Vital signs upon ED presentation as follows: BP = 117/76, HR = 101, RR = 20, T = 36.8 C (98.3 F), O2 Sat = 100% on RA.  Diagnosed with Cellulitis.  Prescribed Vancomycin 1 gram IV, Zosyn 3.375 grams IV x one and IVF NS 1 liter bolus.  Already seen by Podiatry team. 28 year old male, with past history significant for Diabetes mellitus, DM Retinopathy, presented to the ED secondary to worsening pain of the right foot along with swelling and oozing.  Seen ad evaluated at bedside; NAD.  Patient relates that he underwent amputation of the right 5th toe in the previous month and has adhered to follow-up schedule with his podiatrist, as well as being meticulous re wound care and preventing any infection.  On Friday past (06/30./2017), patient had stitches placed at a site of the wound that had been left open or draining.  Since then, he reports experiencing worsening pains (7/10 at maximum intensity; 5/10 at the time of being seen).  Also had subjective fever with uncontrollable shivering and sweating.      Reports loss of appetite for some time - cause unknown, and thinks he might have lost weight.        Vital signs upon ED presentation as follows: BP = 117/76, HR = 101, RR = 20, T = 36.8 C (98.3 F), O2 Sat = 100% on RA.  Diagnosed with Cellulitis.  Prescribed Vancomycin 1 gram IV, Zosyn 3.375 grams IV x one and IVF NS 1 liter bolus.  Already seen by Podiatry team. 28 year old male, with past history significant for Diabetes mellitus, DM Retinopathy, presented to the ED secondary to worsening pain of the right foot along with swelling and oozing.  Seen ad evaluated at bedside; NAD.  Patient relates that he underwent amputation of the right 5th toe in the previous month and has adhered to follow-up schedule with his podiatrist, as well as being meticulous re wound care and preventing any infection.  On Friday past (06/30./2017), patient had stitches placed at a site of the wound that had been left open or draining.  Since then, he reports experiencing worsening pains radiating to just above the ankle  (7/10 at maximum intensity; 5/10 at the time of being seen).  Also had subjective fever with uncontrollable shivering and sweating.  No reports of headaches, dizziness, shortness of breath, chest pain, abdominal pain, diarrhea or any other associated signs/symptoms.    Reports loss of appetite for some time - cause unknown, and thinks he might have lost weight.    Vital signs upon ED presentation as follows: BP = 117/76, HR = 101, RR = 20, T = 36.8 C (98.3 F), O2 Sat = 100% on RA.  Diagnosed with Cellulitis.  Prescribed Vancomycin 1 gram IV, Zosyn 3.375 grams IV x one and IVF NS 1 liter bolus.  Already seen and treated by Podiatry team.

## 2017-07-06 NOTE — CONSULT NOTE ADULT - ASSESSMENT
33 y/o M w/ R foot surgical site infection  - Pt seen and examined  - Sutures removed using sterile suture removal set  - Wound explored using sterile hemostat. 2 cc purulence expressed. Flushed well with normal saline. Wound culture taken  - Packed and dressed w/ Dry sterile dressing  - Rec admission for IV Vanco and Zosyn  - Likely no sx intervention as long as appearance improves over next few days  - Will discuss with attending

## 2017-07-06 NOTE — H&P ADULT - NSHPLABSRESULTS_GEN_ALL_CORE
14.6   12.50 )-----------( 275      ( 05 Jul 2017 19:53 )             44.6       07-05    140  |  98  |  11  ----------------------------<  202<H>  3.5   |  27  |  0.82    Ca    9.6      05 Jul 2017 19:53    TPro  8.0  /  Alb  4.1  /  TBili  0.6  /  DBili  x   /  AST  42<H>  /  ALT  44<H>  /  AlkPhos  89  07-05 14.6   12.50 )-----------( 275      ( 05 Jul 2017 19:53 )             44.6       07-05    140  |  98  |  11  ----------------------------<  202<H>  3.5   |  27  |  0.82    Ca    9.6      05 Jul 2017 19:53    TPro  8.0  /  Alb  4.1  /  TBili  0.6  /  DBili  x   /  AST  42<H>  /  ALT  44<H>  /  AlkPhos  89  07-05    FOOT X-RAY, RIGHT (PRELIMINARY)  S/P right fifth metatarsal amputation. No evidence of soft tissue gas.  Vascular calcifications.    Follow up official report. 14.6   12.50 )-----------( 275      ( 05 Jul 2017 19:53 )             44.6       07-05    140  |  98  |  11  ----------------------------<  202<H>  3.5   |  27  |  0.82    Ca    9.6      05 Jul 2017 19:53    TPro  8.0  /  Alb  4.1  /  TBili  0.6  /  DBili  x   /  AST  42<H>  /  ALT  44<H>  /  AlkPhos  89  07-05      FOOT X-RAY, RIGHT (PRELIMINARY)  S/P right fifth metatarsal amputation. No evidence of soft tissue gas.  Vascular calcifications.    Follow up official report.

## 2017-07-06 NOTE — CONSULT NOTE ADULT - PROBLEM SELECTOR RECOMMENDATION 9
- recommend basal bolus insulin while inpatient - lantus 25, humalog 8/8/8, low sliding scale  - consistent carbohydrate diet  - plan to d/c home on basal bolus insulin and off of 70/30 insulin; regimen to be determined - recommend basal bolus insulin while inpatient - lantus 25, humalog 8/8/8, moderate sliding scale  - consistent carbohydrate diet  - plan to d/c home on basal bolus insulin and off of 70/30 insulin; regimen to be determined  - given that patient was diagnosed with DM at age 6, check anti-LAZARA and islet cell antibodies to r/o DM1, SONIDO

## 2017-07-07 DIAGNOSIS — E10.65 TYPE 1 DIABETES MELLITUS WITH HYPERGLYCEMIA: ICD-10-CM

## 2017-07-07 LAB
BUN SERPL-MCNC: 11 MG/DL — SIGNIFICANT CHANGE UP (ref 7–23)
CALCIUM SERPL-MCNC: 9.1 MG/DL — SIGNIFICANT CHANGE UP (ref 8.4–10.5)
CHLORIDE SERPL-SCNC: 97 MMOL/L — LOW (ref 98–107)
CO2 SERPL-SCNC: 27 MMOL/L — SIGNIFICANT CHANGE UP (ref 22–31)
CREAT SERPL-MCNC: 0.7 MG/DL — SIGNIFICANT CHANGE UP (ref 0.5–1.3)
CRP SERPL-MCNC: 91.5 MG/L — HIGH (ref 0.3–5)
ERYTHROCYTE [SEDIMENTATION RATE] IN BLOOD: 69 MM/HR — HIGH (ref 1–15)
GLUCOSE SERPL-MCNC: 300 MG/DL — HIGH (ref 70–99)
HCT VFR BLD CALC: 41.2 % — SIGNIFICANT CHANGE UP (ref 39–50)
HGB BLD-MCNC: 13.4 G/DL — SIGNIFICANT CHANGE UP (ref 13–17)
MCHC RBC-ENTMCNC: 27.6 PG — SIGNIFICANT CHANGE UP (ref 27–34)
MCHC RBC-ENTMCNC: 32.5 % — SIGNIFICANT CHANGE UP (ref 32–36)
MCV RBC AUTO: 84.9 FL — SIGNIFICANT CHANGE UP (ref 80–100)
NRBC # FLD: 0 — SIGNIFICANT CHANGE UP
PLATELET # BLD AUTO: 263 K/UL — SIGNIFICANT CHANGE UP (ref 150–400)
PMV BLD: 9.7 FL — SIGNIFICANT CHANGE UP (ref 7–13)
POTASSIUM SERPL-MCNC: 4.1 MMOL/L — SIGNIFICANT CHANGE UP (ref 3.5–5.3)
POTASSIUM SERPL-SCNC: 4.1 MMOL/L — SIGNIFICANT CHANGE UP (ref 3.5–5.3)
RBC # BLD: 4.85 M/UL — SIGNIFICANT CHANGE UP (ref 4.2–5.8)
RBC # FLD: 12.3 % — SIGNIFICANT CHANGE UP (ref 10.3–14.5)
SODIUM SERPL-SCNC: 136 MMOL/L — SIGNIFICANT CHANGE UP (ref 135–145)
WBC # BLD: 7.38 K/UL — SIGNIFICANT CHANGE UP (ref 3.8–10.5)
WBC # FLD AUTO: 7.38 K/UL — SIGNIFICANT CHANGE UP (ref 3.8–10.5)

## 2017-07-07 PROCEDURE — 99232 SBSQ HOSP IP/OBS MODERATE 35: CPT

## 2017-07-07 PROCEDURE — 99233 SBSQ HOSP IP/OBS HIGH 50: CPT

## 2017-07-07 PROCEDURE — 99222 1ST HOSP IP/OBS MODERATE 55: CPT | Mod: GC

## 2017-07-07 RX ORDER — INSULIN LISPRO 100/ML
14 VIAL (ML) SUBCUTANEOUS
Qty: 0 | Refills: 0 | Status: DISCONTINUED | OUTPATIENT
Start: 2017-07-07 | End: 2017-07-08

## 2017-07-07 RX ORDER — INSULIN GLARGINE 100 [IU]/ML
33 INJECTION, SOLUTION SUBCUTANEOUS AT BEDTIME
Qty: 0 | Refills: 0 | Status: DISCONTINUED | OUTPATIENT
Start: 2017-07-07 | End: 2017-07-08

## 2017-07-07 RX ADMIN — Medication 2: at 21:56

## 2017-07-07 RX ADMIN — Medication 6: at 08:46

## 2017-07-07 RX ADMIN — PIPERACILLIN AND TAZOBACTAM 25 GRAM(S): 4; .5 INJECTION, POWDER, LYOPHILIZED, FOR SOLUTION INTRAVENOUS at 22:04

## 2017-07-07 RX ADMIN — Medication 1 TABLET(S): at 12:10

## 2017-07-07 RX ADMIN — INSULIN GLARGINE 33 UNIT(S): 100 INJECTION, SOLUTION SUBCUTANEOUS at 21:56

## 2017-07-07 RX ADMIN — Medication 2: at 17:26

## 2017-07-07 RX ADMIN — Medication 14 UNIT(S): at 17:26

## 2017-07-07 RX ADMIN — Medication 8 UNIT(S): at 12:11

## 2017-07-07 RX ADMIN — Medication 250 MILLIGRAM(S): at 05:30

## 2017-07-07 RX ADMIN — Medication 250 MILLIGRAM(S): at 20:53

## 2017-07-07 RX ADMIN — PIPERACILLIN AND TAZOBACTAM 25 GRAM(S): 4; .5 INJECTION, POWDER, LYOPHILIZED, FOR SOLUTION INTRAVENOUS at 15:26

## 2017-07-07 RX ADMIN — Medication 8 UNIT(S): at 08:47

## 2017-07-07 RX ADMIN — PIPERACILLIN AND TAZOBACTAM 25 GRAM(S): 4; .5 INJECTION, POWDER, LYOPHILIZED, FOR SOLUTION INTRAVENOUS at 06:40

## 2017-07-07 RX ADMIN — Medication 6: at 12:11

## 2017-07-07 NOTE — DISCHARGE NOTE ADULT - PATIENT PORTAL LINK FT
“You can access the [facility] Patient Portal, offered by [facility], by registering with the following website: [email or web address]”

## 2017-07-07 NOTE — PROGRESS NOTE ADULT - ASSESSMENT
33 y/o M w/ R foot surgical site infection  - Pt seen and examined  - appearance improving  - Packed and dressed w/ Dry sterile dressing  - Likely no sx intervention, VAC ordered 7/6 to be applied today 7/7  - Waiting for final culture report for dc abx  - Discussed with attending 33 y/o M w/ R foot surgical site infection  - Pt seen and examined  - appearance improving  - Packed and dressed w/ Dry sterile dressing  - Likely no sx intervention, VAC ordered 7/6 to be applied today 7/7  - Recommending Bactrim po 10 days   - Discussed with attending

## 2017-07-07 NOTE — DISCHARGE NOTE ADULT - PLAN OF CARE
Your blood glucose was elevated in-hospital. Please follow up with the endocrinologist outpatient in 1-2 weeks. Please call [phone] to make an appointment. Resolution of infection You were found to have an infection of your right lower extremity. You were treated with IV antibiotics. You were found to have an infection of your right lower extremity. You were treated with IV antibiotics.  Continue oral antibiotics until completed as ordered Your blood glucose was elevated in-hospital and changes to your diabetes medication regimen have been made. Please follow up with the endocrinologist outpatient in 1-2 weeks. Please call [phone] to make an appointment. Your blood glucose was elevated in-hospital and changes to your diabetes medication regimen have been made. Please follow up with the endocrinologist outpatient in 1-2 weeks.   [facility] at [address], [phone], and [address], [phone] Please call [phone] to make an appointment.

## 2017-07-07 NOTE — PROGRESS NOTE ADULT - SUBJECTIVE AND OBJECTIVE BOX
CC: F/u for cellulitis of the RLE    SUBJECTIVE / OVERNIGHT EVENTS: Patient seen and examined. Currently, no new complaints. Pain well controlled in RLE. No fevers, chills, or pain at present time.     MEDICATIONS  (STANDING):  piperacillin/tazobactam IVPB. 3.375 Gram(s) IV Intermittent every 8 hours  sodium chloride 0.45%. 1000 milliLiter(s) (75 mL/Hr) IV Continuous <Continuous>  insulin lispro (HumaLOG) corrective regimen sliding scale   SubCutaneous three times a day before meals  insulin lispro (HumaLOG) corrective regimen sliding scale   SubCutaneous at bedtime  dextrose 5%. 1000 milliLiter(s) (50 mL/Hr) IV Continuous <Continuous>  dextrose 50% Injectable 12.5 Gram(s) IV Push once  dextrose 50% Injectable 25 Gram(s) IV Push once  dextrose 50% Injectable 25 Gram(s) IV Push once  lactobacillus acidophilus 1 Tablet(s) Oral daily  insulin glargine Injectable (LANTUS) 25 Unit(s) SubCutaneous at bedtime  insulin lispro Injectable (HumaLOG) 8 Unit(s) SubCutaneous three times a day before meals  vancomycin  IVPB 1000 milliGRAM(s) IV Intermittent every 12 hours    MEDICATIONS  (PRN):  acetaminophen   Tablet. 650 milliGRAM(s) Oral every 6 hours PRN Mild Pain (1 - 3)  oxyCODONE    IR 5 milliGRAM(s) Oral every 4 hours PRN Moderate Pain (4 - 6)  oxyCODONE    IR 10 milliGRAM(s) Oral every 4 hours PRN Severe Pain (7 - 10)  dextrose Gel 1 Dose(s) Oral once PRN Blood Glucose LESS THAN 70 milliGRAM(s)/deciliter  glucagon  Injectable 1 milliGRAM(s) IntraMuscular once PRN Glucose LESS THAN 70 milligrams/deciliter    PHYSICAL EXAM:  Vital Signs Last 24 Hrs  T(C): 36.8 (07 Jul 2017 13:56), Max: 36.8 (07 Jul 2017 13:56)  T(F): 98.3 (07 Jul 2017 13:56), Max: 98.3 (07 Jul 2017 13:56)  HR: 71 (07 Jul 2017 13:56) (66 - 71)  BP: 109/66 (07 Jul 2017 13:56) (109/66 - 127/76)  BP(mean): --  RR: 17 (07 Jul 2017 13:56) (17 - 18)  SpO2: 98% (07 Jul 2017 13:56) (98% - 100%)    GENERAL: NAD, well-developed  HEAD:  Atraumatic, Normocephalic  CHEST/LUNG: Clear to auscultation bilaterally; No wheeze  HEART: Regular rate and rhythm; No murmurs, rubs, or gallops  ABDOMEN: Soft, Nontender, Nondistended; Bowel sounds present  EXTREMITIES:  No edema  PSYCH: AAOx3  NEUROLOGY: non-focal  SKIN: RLE ulcer wrapped cleanly with wound vac attached     LABS:  CAPILLARY BLOOD GLUCOSE  267 (07 Jul 2017 12:04)  264 (07 Jul 2017 08:39)  273 (06 Jul 2017 21:57)  222 (06 Jul 2017 16:34)                                13.4   7.38  )-----------( 263      ( 07 Jul 2017 09:50 )             41.2     07-07    136  |  97<L>  |  11  ----------------------------<  300<H>  4.1   |  27  |  0.70    Ca    9.1      07 Jul 2017 07:00  Phos  2.8     07-06  Mg     1.7     07-06    TPro  6.9  /  Alb  3.5  /  TBili  0.6  /  DBili  x   /  AST  33  /  ALT  37  /  AlkPhos  75  07-06    RADIOLOGY & ADDITIONAL TESTS:    Imaging Personally Reviewed:  Xray Foot AP + Lateral + Oblique, Right (07.05.17 @ 21:00) >  IMPRESSION:  Right foot status post fifth metatarsal amputation without   evidence of soft tissue gas    Consultant(s) Notes Reviewed:  Endocrinology     Care Discussed with Consultants/Other Providers: ID- Discussed past history and help with antibiotic regiment

## 2017-07-07 NOTE — DISCHARGE NOTE ADULT - HOME CARE AGENCY
[facility]  [phone]   Nurse to call and visit day after discharge  to place Wound VAc

## 2017-07-07 NOTE — PROGRESS NOTE ADULT - PROBLEM SELECTOR PLAN 2
Uncontrolled type 2 DM (vs. type 1??- patient has always been told he is type 1) with diabetic foot ulcer   - Endocrinology recs appreciated  - A1C 9  - C/w lantus 25 and Humalog 8 units TID with meals  - FS still elevated  - Monitor FS's daily and f/u Endo recs for insulin titration   - A1C 9   - Consistent carb diet  - foot ulcer being managed by Podiatry team

## 2017-07-07 NOTE — PROGRESS NOTE ADULT - PROBLEM SELECTOR PLAN 1
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

## 2017-07-07 NOTE — PROGRESS NOTE ADULT - ASSESSMENT
28 year old male, with past history significant for Diabetes mellitus 1, DM Retinopathy presents with right leg cellulitis, also with uncontrolled DM1

## 2017-07-07 NOTE — CONSULT NOTE ADULT - ATTENDING COMMENTS
32 year old with DM s/p recent partial fifth ray amputation for gangrene - cultures at that time grew MRSA and group b strep. He was thought to have clear margins and was discharge on augmentin and doxycline.     2-3 days ago, he noted the onset of chills with increased drainage from the 5th ray site.     He presented for evaluation and had bedside debridement and placement of a vac.   A prelim culture is growing Staph aureus    I suspect a local abscess at the site of the right  fifth ray resection. Meenu weinstein need to discuss with podiatry the suspicion for residual OM there.    Continue vanco / zosyn at this time pending additional culture data.   Check vanco through before the fourth dose.     Please call the ID service [phone] with questions or concerns over the weekend
32M uncontrolled DM, unclear type 2 vs. 1. Would check LAZARA and islet cell antibodies.  Agree with optimize basal bolus plan as outlined. Stop 70/30.  Outpatient endocrine followup [phone].

## 2017-07-07 NOTE — PROGRESS NOTE ADULT - SUBJECTIVE AND OBJECTIVE BOX
Chief Complaint: DM1    History: tolerating po, no hypoglycemia    MEDICATIONS  (STANDING):  piperacillin/tazobactam IVPB. 3.375 Gram(s) IV Intermittent every 8 hours  sodium chloride 0.45%. 1000 milliLiter(s) (75 mL/Hr) IV Continuous <Continuous>  insulin lispro (HumaLOG) corrective regimen sliding scale   SubCutaneous three times a day before meals  insulin lispro (HumaLOG) corrective regimen sliding scale   SubCutaneous at bedtime  dextrose 5%. 1000 milliLiter(s) (50 mL/Hr) IV Continuous <Continuous>  dextrose 50% Injectable 12.5 Gram(s) IV Push once  dextrose 50% Injectable 25 Gram(s) IV Push once  dextrose 50% Injectable 25 Gram(s) IV Push once  lactobacillus acidophilus 1 Tablet(s) Oral daily  insulin glargine Injectable (LANTUS) 25 Unit(s) SubCutaneous at bedtime  insulin lispro Injectable (HumaLOG) 8 Unit(s) SubCutaneous three times a day before meals  vancomycin  IVPB 1000 milliGRAM(s) IV Intermittent every 12 hours    MEDICATIONS  (PRN):  acetaminophen   Tablet. 650 milliGRAM(s) Oral every 6 hours PRN Mild Pain (1 - 3)  oxyCODONE    IR 5 milliGRAM(s) Oral every 4 hours PRN Moderate Pain (4 - 6)  oxyCODONE    IR 10 milliGRAM(s) Oral every 4 hours PRN Severe Pain (7 - 10)  dextrose Gel 1 Dose(s) Oral once PRN Blood Glucose LESS THAN 70 milliGRAM(s)/deciliter  glucagon  Injectable 1 milliGRAM(s) IntraMuscular once PRN Glucose LESS THAN 70 milligrams/deciliter      Allergies    No Known Allergies    Intolerances      Review of Systems:  Constitutional: No fever  Eyes: No blurry vision  Neuro: No tremors  HEENT: No pain  Cardiovascular: No chest pain, palpitations  Respiratory: No SOB, no cough  GI: No nausea, vomiting, abdominal pain  : No dysuria  Skin: no rash  Psych: no depression  Endocrine: no polyuria, polydipsia  Hem/lymph: no swelling  Osteoporosis: no fractures    ALL OTHER SYSTEMS REVIEWED AND NEGATIVE    PHYSICAL EXAM:  VITALS: T(C): 36.8 (07-07-17 @ 13:56)  T(F): 98.3 (07-07-17 @ 13:56), Max: 98.3 (07-07-17 @ 13:56)  HR: 71 (07-07-17 @ 13:56) (66 - 71)  BP: 109/66 (07-07-17 @ 13:56) (109/66 - 127/76)  RR:  (17 - 18)  SpO2:  (98% - 100%)  Wt(kg): --  GENERAL: NAD, well-groomed, well-developed  EYES: No proptosis, no lid lag, anicteric  HEENT:  Atraumatic, Normocephalic, moist mucous membranes  SKIN: RLE with wound vac  NEURO: extraocular movements intact, no tremor  PSYCH: Alert and oriented x 3, normal affect, normal mood    CAPILLARY BLOOD GLUCOSE  267 (07-07 @ 12:04)  264 (07-07 @ 08:39)  273 (07-06 @ 21:57)  222 (07-06 @ 16:34)  227 (07-06 @ 12:00)  155 (07-06 @ 07:25)  69 (07-06 @ 05:47)  97 (07-06 @ 02:17)  91 (07-05 @ 17:50)      07-07    136  |  97<L>  |  11  ----------------------------<  300<H>  4.1   |  27  |  0.70    EGFR if : 143  EGFR if non : 123    Ca    9.1      07-07  Mg     1.7     07-06  Phos  2.8     07-06    TPro  6.9  /  Alb  3.5  /  TBili  0.6  /  DBili  x   /  AST  33  /  ALT  37  /  AlkPhos  75  07-06          Thyroid Function Tests:  07-06 @ 06:05 TSH 2.89 FreeT4 -- T3 -- Anti TPO -- Anti Thyroglobulin Ab -- TSI --      Hemoglobin A1C, Whole Blood: 9.0 % <H> [4.0 - 5.6] (07-06-17 @ 06:05)

## 2017-07-07 NOTE — DISCHARGE NOTE ADULT - MEDICATION SUMMARY - MEDICATIONS TO TAKE
I will START or STAY ON the medications listed below when I get home from the hospital:    acetaminophen 325 mg oral tablet  -- 2 tab(s) by mouth every 6 hours, As needed, Mild Pain (1 - 3)  -- Indication: For Pain    Lantus Solostar Pen 100 units/mL subcutaneous solution  -- 46 unit(s) subcutaneous once a day (at bedtime)  -- Do not drink alcoholic beverages when taking this medication.  It is very important that you take or use this exactly as directed.  Do not skip doses or discontinue unless directed by your doctor.  Keep in refrigerator.  Do not freeze.    -- Indication: For Diabetes mellitus type II, uncontrolled    HumaLOG KwikPen 100 units/mL subcutaneous solution  -- 18 units subcutaneous before breakfast, 14 units subcutaneously before lunch and 16  units subcutaneously before dinner  -- Do not drink alcoholic beverages when taking this medication.  It is very important that you take or use this exactly as directed.  Do not skip doses or discontinue unless directed by your doctor.  Keep in refrigerator.  Do not freeze.    -- Indication: For Diabetes mellitus type II, uncontrolled    doxycycline monohydrate 100 mg oral capsule  -- 1 cap(s) by mouth every 12 hours MDD:2 tabs  -- Indication: For Cellulitis    lactobacillus acidophilus oral capsule  -- 1 tab(s) by mouth once a day  -- Indication: For Prophylaxis I will START or STAY ON the medications listed below when I get home from the hospital:    acetaminophen 325 mg oral tablet  -- 2 tab(s) by mouth every 6 hours, As needed, Mild Pain (1 - 3)  -- Indication: For Pain    Lantus Solostar Pen 100 units/mL subcutaneous solution  -- 46 unit(s) subcutaneous once a day (at bedtime)  -- Do not drink alcoholic beverages when taking this medication.  It is very important that you take or use this exactly as directed.  Do not skip doses or discontinue unless directed by your doctor.  Keep in refrigerator.  Do not freeze.    -- Indication: For Diabetes mellitus type II, uncontrolled    HumaLOG KwikPen 100 units/mL subcutaneous solution  -- 18 units subcutaneous before breakfast, 14 units subcutaneously before lunch and 16  units subcutaneously before dinner  -- Do not drink alcoholic beverages when taking this medication.  It is very important that you take or use this exactly as directed.  Do not skip doses or discontinue unless directed by your doctor.  Keep in refrigerator.  Do not freeze.    -- Indication: For Diabetes mellitus type II, uncontrolled    doxycycline hyclate 100 mg oral capsule  -- 1 cap(s) by mouth 2 times a day  -- Avoid prolonged or excessive exposure to direct and/or artificial sunlight while taking this medication.  Do not take this drug if you are pregnant.  Finish all this medication unless otherwise directed by prescriber.  Medication should be taken with plenty of water.    -- Indication: For Cellulitis of right lower extremity    lactobacillus acidophilus oral capsule  -- 1 tab(s) by mouth once a day  -- Indication: For Prophylaxis

## 2017-07-07 NOTE — DISCHARGE NOTE ADULT - ADDITIONAL INSTRUCTIONS
Wound-care: Wound VAC at home applied at 125mmhg continuous negative pressure, dressed surrounding VAC with 4x4 gauze and kerlix  Follow-up: Please follow-up with Dr. Blue within 1 week, call [phone]  (Minnie) or [phone] (Jass Ferreira) to schedule appointment  Weight-bearing: Weight-bearing as tolerated to the heel in a surgical shoe  Antibiotics: Please continue antibiotics as instructed

## 2017-07-07 NOTE — PROGRESS NOTE ADULT - PROBLEM SELECTOR PLAN 1
BG remain elevated.  Increase Humalog to 14/14/14  Increase Lantus to 33u qhs  DC on optimized basal bolus plan (stop 70/30).  Outpatient endocrine follow up [phone].

## 2017-07-07 NOTE — DISCHARGE NOTE ADULT - HOSPITAL COURSE
28 year old male, with past history significant for Diabetes mellitus, DM Retinopathy, presented to the ED secondary to worsening pain of the right foot along with swelling and oozing.     Cellulitis of right lower extremity  - s/p amputation of right 5th toe in May 2017  - Podiatry team consulted; sutures removed, cleaned and packed, unlikely for surgery   - started on Zosyn and Vancomycin in the ED  -wound cs- NTD  Follow up with Dr. Blue (podiatry)  within 1 week of DC.  Call [phone] for apt.     Type 2 diabetes mellitus with foot ulcer  - hyperglycemic to 202 on CMP (but subsequent FS reportedly 91 and 97)  - on Lantus 30 U HS (not given tonight), Humalog 12 units BID, ISS per FS  -A1c 9.0  - Consistent carb diet  - foot ulcer being managed by Podiatry team.       Smoking  - actively smoking for the past ~ 18 years; < 0.5 packs QD  - thinking about quitting  - Nicotine patch offered, but refused  - offered smoking cessation literature, but patient declines same - stating he is able to stop on his own  - Continue to encourage smoking cessation 28 year old male, with past history significant for Diabetes mellitus, DM Retinopathy, presented to the ED secondary to worsening pain of the right foot along with swelling and oozing.     Cellulitis of right lower extremity  - s/p amputation of right 5th toe in May 2017  - Podiatry team consulted; sutures removed, cleaned and packed, unlikely for surgery   - started on Zosyn and Vancomycin in the ED  -wound cs- NTD  Follow up with Dr. Blue (podiatry)  within 1 week of DC.  Call [phone] for apt.     Type 2 diabetes mellitus with foot ulcer  - hyperglycemic to 202 on CMP (but subsequent FS reportedly 91 and 97)  - on Lantus 30 U HS (not given tonight), Humalog 12 units BID, ISS per FS  -A1c 9.0  - Consistent carb diet  - foot ulcer being managed by Podiatry team.       Smoking  - actively smoking for the past ~ 18 years; < 0.5 packs QD  - thinking about quitting  - Nicotine patch offered, but refused  - offered smoking cessation literature, but patient declines same - stating he is able to stop on his own  - Continue to encourage smoking cessation       dispo: home with home care 28 year old male, with past history significant for uncontrolled type 1 Diabetes mellitus, DM Retinopathy, and recent MRSA soft tissue infection of the right foot s/p debridement and amputation of toe in May 2017, presented to the ED secondary to worsening pain of the right foot along with swelling and oozing, admitted for sepsis 2/2 to persistent MRSA cellulitis and infected diabetic foot ulcer. During admission, patient treated initially with vancomycin and zosyn but was eventually transitioned over to vancomycin only after wound culture revealed persistent MRSA. Podiatry saw patient who performed local wound care and placed wound vac on. ID also saw patient who eventually transitioned vancomycin to PO Doxycycline for discharge. Endocrinology recommended basal/bolus insulin. Supplies were sent to patient's pharmacy and diabetic education was also provided. FS's were better controlled on discharge compared to admission and A1C was found to be 9. Patient is to follow up with ID and podiatry on discharge. Wound care RN will be provided for home visits.

## 2017-07-08 DIAGNOSIS — E10.65 TYPE 1 DIABETES MELLITUS WITH HYPERGLYCEMIA: ICD-10-CM

## 2017-07-08 LAB
-  CEFAZOLIN: SIGNIFICANT CHANGE UP
-  CIPROFLOXACIN: SIGNIFICANT CHANGE UP
-  CLINDAMYCIN: SIGNIFICANT CHANGE UP
-  DAPTOMYCIN: SIGNIFICANT CHANGE UP
-  ERYTHROMYCIN: SIGNIFICANT CHANGE UP
-  GENTAMICIN: SIGNIFICANT CHANGE UP
-  LINEZOLID: SIGNIFICANT CHANGE UP
-  MOXIFLOXACIN(AEROBIC): SIGNIFICANT CHANGE UP
-  OXACILLIN: SIGNIFICANT CHANGE UP
-  PENICILLIN: SIGNIFICANT CHANGE UP
-  RIFAMPIN.: SIGNIFICANT CHANGE UP
-  TETRACYCLINE: SIGNIFICANT CHANGE UP
-  TRIMETHOPRIM/SULFAMETHOXAZOLE: SIGNIFICANT CHANGE UP
-  VANCOMYCIN: SIGNIFICANT CHANGE UP
BUN SERPL-MCNC: 17 MG/DL — SIGNIFICANT CHANGE UP (ref 7–23)
CALCIUM SERPL-MCNC: 9.4 MG/DL — SIGNIFICANT CHANGE UP (ref 8.4–10.5)
CHLORIDE SERPL-SCNC: 96 MMOL/L — LOW (ref 98–107)
CO2 SERPL-SCNC: 22 MMOL/L — SIGNIFICANT CHANGE UP (ref 22–31)
CREAT SERPL-MCNC: 0.73 MG/DL — SIGNIFICANT CHANGE UP (ref 0.5–1.3)
CULTURE RESULTS: SIGNIFICANT CHANGE UP
GLUCOSE SERPL-MCNC: 298 MG/DL — HIGH (ref 70–99)
GRAM STN SPEC: SIGNIFICANT CHANGE UP
HCT VFR BLD CALC: 40.9 % — SIGNIFICANT CHANGE UP (ref 39–50)
HGB BLD-MCNC: 13.6 G/DL — SIGNIFICANT CHANGE UP (ref 13–17)
MCHC RBC-ENTMCNC: 27.9 PG — SIGNIFICANT CHANGE UP (ref 27–34)
MCHC RBC-ENTMCNC: 33.3 % — SIGNIFICANT CHANGE UP (ref 32–36)
MCV RBC AUTO: 84 FL — SIGNIFICANT CHANGE UP (ref 80–100)
METHOD TYPE: SIGNIFICANT CHANGE UP
NRBC # FLD: 0 — SIGNIFICANT CHANGE UP
ORGANISM # SPEC MICROSCOPIC CNT: SIGNIFICANT CHANGE UP
ORGANISM # SPEC MICROSCOPIC CNT: SIGNIFICANT CHANGE UP
PLATELET # BLD AUTO: 308 K/UL — SIGNIFICANT CHANGE UP (ref 150–400)
PMV BLD: 9.7 FL — SIGNIFICANT CHANGE UP (ref 7–13)
POTASSIUM SERPL-MCNC: 4.4 MMOL/L — SIGNIFICANT CHANGE UP (ref 3.5–5.3)
POTASSIUM SERPL-SCNC: 4.4 MMOL/L — SIGNIFICANT CHANGE UP (ref 3.5–5.3)
RBC # BLD: 4.87 M/UL — SIGNIFICANT CHANGE UP (ref 4.2–5.8)
RBC # FLD: 12.2 % — SIGNIFICANT CHANGE UP (ref 10.3–14.5)
SODIUM SERPL-SCNC: 134 MMOL/L — LOW (ref 135–145)
SPECIMEN SOURCE: SIGNIFICANT CHANGE UP
SPECIMEN SOURCE: SIGNIFICANT CHANGE UP
VANCOMYCIN TROUGH SERPL-MCNC: 5.3 UG/ML — LOW (ref 10–20)
WBC # BLD: 7.73 K/UL — SIGNIFICANT CHANGE UP (ref 3.8–10.5)
WBC # FLD AUTO: 7.73 K/UL — SIGNIFICANT CHANGE UP (ref 3.8–10.5)

## 2017-07-08 PROCEDURE — 99233 SBSQ HOSP IP/OBS HIGH 50: CPT

## 2017-07-08 RX ORDER — VANCOMYCIN HCL 1 G
1250 VIAL (EA) INTRAVENOUS EVERY 12 HOURS
Qty: 0 | Refills: 0 | Status: DISCONTINUED | OUTPATIENT
Start: 2017-07-08 | End: 2017-07-11

## 2017-07-08 RX ORDER — INSULIN LISPRO 100/ML
16 VIAL (ML) SUBCUTANEOUS
Qty: 0 | Refills: 0 | Status: DISCONTINUED | OUTPATIENT
Start: 2017-07-08 | End: 2017-07-08

## 2017-07-08 RX ORDER — INSULIN GLARGINE 100 [IU]/ML
40 INJECTION, SOLUTION SUBCUTANEOUS AT BEDTIME
Qty: 0 | Refills: 0 | Status: DISCONTINUED | OUTPATIENT
Start: 2017-07-08 | End: 2017-07-09

## 2017-07-08 RX ORDER — INSULIN LISPRO 100/ML
18 VIAL (ML) SUBCUTANEOUS
Qty: 0 | Refills: 0 | Status: DISCONTINUED | OUTPATIENT
Start: 2017-07-08 | End: 2017-07-11

## 2017-07-08 RX ADMIN — PIPERACILLIN AND TAZOBACTAM 25 GRAM(S): 4; .5 INJECTION, POWDER, LYOPHILIZED, FOR SOLUTION INTRAVENOUS at 22:48

## 2017-07-08 RX ADMIN — PIPERACILLIN AND TAZOBACTAM 25 GRAM(S): 4; .5 INJECTION, POWDER, LYOPHILIZED, FOR SOLUTION INTRAVENOUS at 14:55

## 2017-07-08 RX ADMIN — Medication 14 UNIT(S): at 11:52

## 2017-07-08 RX ADMIN — INSULIN GLARGINE 40 UNIT(S): 100 INJECTION, SOLUTION SUBCUTANEOUS at 22:48

## 2017-07-08 RX ADMIN — Medication 1 TABLET(S): at 11:52

## 2017-07-08 RX ADMIN — Medication 18 UNIT(S): at 17:46

## 2017-07-08 RX ADMIN — Medication 14 UNIT(S): at 08:40

## 2017-07-08 RX ADMIN — Medication 6: at 11:52

## 2017-07-08 RX ADMIN — Medication 6: at 08:40

## 2017-07-08 RX ADMIN — PIPERACILLIN AND TAZOBACTAM 25 GRAM(S): 4; .5 INJECTION, POWDER, LYOPHILIZED, FOR SOLUTION INTRAVENOUS at 06:32

## 2017-07-08 RX ADMIN — Medication 166.67 MILLIGRAM(S): at 18:47

## 2017-07-08 NOTE — PROGRESS NOTE ADULT - SUBJECTIVE AND OBJECTIVE BOX
Chief Complaint: Hyperglycemia    History: Pt eating well, FS in 200s today. Patient denies any complaints.    MEDICATIONS  (STANDING):  piperacillin/tazobactam IVPB. 3.375 Gram(s) IV Intermittent every 8 hours  sodium chloride 0.45%. 1000 milliLiter(s) (75 mL/Hr) IV Continuous <Continuous>  insulin lispro (HumaLOG) corrective regimen sliding scale   SubCutaneous three times a day before meals  insulin lispro (HumaLOG) corrective regimen sliding scale   SubCutaneous at bedtime  dextrose 5%. 1000 milliLiter(s) (50 mL/Hr) IV Continuous <Continuous>  dextrose 50% Injectable 12.5 Gram(s) IV Push once  dextrose 50% Injectable 25 Gram(s) IV Push once  dextrose 50% Injectable 25 Gram(s) IV Push once  lactobacillus acidophilus 1 Tablet(s) Oral daily  vancomycin  IVPB 1250 milliGRAM(s) IV Intermittent every 12 hours  insulin glargine Injectable (LANTUS) 40 Unit(s) SubCutaneous at bedtime  insulin lispro Injectable (HumaLOG) 18 Unit(s) SubCutaneous three times a day before meals    MEDICATIONS  (PRN):  acetaminophen   Tablet. 650 milliGRAM(s) Oral every 6 hours PRN Mild Pain (1 - 3)  oxyCODONE    IR 5 milliGRAM(s) Oral every 4 hours PRN Moderate Pain (4 - 6)  oxyCODONE    IR 10 milliGRAM(s) Oral every 4 hours PRN Severe Pain (7 - 10)  dextrose Gel 1 Dose(s) Oral once PRN Blood Glucose LESS THAN 70 milliGRAM(s)/deciliter  glucagon  Injectable 1 milliGRAM(s) IntraMuscular once PRN Glucose LESS THAN 70 milligrams/deciliter      Allergies    No Known Allergies    Intolerances      Review of Systems:  Constitutional: No fever  Eyes: No blurry vision  Neuro: No tremors  HEENT: No pain  Cardiovascular: No chest pain, palpitations  Respiratory: No SOB, no cough  GI: No nausea, vomiting, abdominal pain  : No dysuria  Skin: no rash  Psych: no depression  Endocrine: no polyuria, polydipsia  Hem/lymph: no swelling  Osteoporosis: no fractures    ALL OTHER SYSTEMS REVIEWED AND NEGATIVE    PHYSICAL EXAM:  VITALS: T(C): 36.9 (07-08-17 @ 13:59)  T(F): 98.4 (07-08-17 @ 13:59), Max: 98.7 (07-07-17 @ 21:18)  HR: 69 (07-08-17 @ 13:59) (62 - 69)  BP: 114/70 (07-08-17 @ 13:59) (114/70 - 121/64)  RR:  (17 - 18)  SpO2:  (98% - 100%)  Wt(kg): --  GENERAL: NAD, well-groomed, well-developed  EYES: No proptosis, no lid lag, anicteric  RESPIRATORY: Clear to auscultation bilaterally; No rales, rhonchi, wheezing, or rubs  CARDIOVASCULAR: Regular rate and rhythm; No murmurs; no peripheral edema  GI: Soft, nontender, non distended, normal bowel sounds  SKIN: R foot bandaged    CAPILLARY BLOOD GLUCOSE  281 (07-08 @ 11:40)  294 (07-08 @ 08:19)  271 (07-07 @ 21:18)  175 (07-07 @ 17:03)  267 (07-07 @ 12:04)  264 (07-07 @ 08:39)  273 (07-06 @ 21:57)  222 (07-06 @ 16:34)  227 (07-06 @ 12:00)  155 (07-06 @ 07:25)  69 (07-06 @ 05:47)  97 (07-06 @ 02:17)  91 (07-05 @ 17:50)      07-08    134<L>  |  96<L>  |  17  ----------------------------<  298<H>  4.4   |  22  |  0.73    EGFR if : 140  EGFR if non : 121    Ca    9.4      07-08  Mg     1.7     07-06  Phos  2.8     07-06    TPro  6.9  /  Alb  3.5  /  TBili  0.6  /  DBili  x   /  AST  33  /  ALT  37  /  AlkPhos  75  07-06          Thyroid Function Tests:  07-06 @ 06:05 TSH 2.89 FreeT4 -- T3 -- Anti TPO -- Anti Thyroglobulin Ab -- TSI --      Hemoglobin A1C, Whole Blood: 9.0 % <H> [4.0 - 5.6] (07-06-17 @ 06:05)

## 2017-07-08 NOTE — PROGRESS NOTE ADULT - PROBLEM SELECTOR PLAN 1
- s/p amputation of right 5th toe in May 2017  - s/p suture placement at last draining site on last Friday  - c/w Vancomycin and Zosyn. Vanco trough subtherapeutic, will increase dose to 1250mg IV Q12hrs  - analgesic PRN (Oxycodone 5 mg and 10 mg PRN)  - f/u cultures (abscess and blood)  - re-culture if patient has repeat fever  -f/ u further podiatry recs. - s/p amputation of right 5th toe in May 2017  - s/p suture placement at last draining site on last Friday  - c/w Vancomycin and Zosyn. Vanco trough subtherapeutic, will increase dose to 1250mg IV Q12hrs  - analgesic PRN (Oxycodone 5 mg and 10 mg PRN)  - f/u cultures (abscess and blood)  - re-culture if patient has repeat fever  -f/ u further podiatry recs, may ultimately require further surgical intervention

## 2017-07-08 NOTE — PROGRESS NOTE ADULT - PROBLEM SELECTOR PROBLEM 2
Type 2 diabetes mellitus with foot ulcer, with long-term current use of insulin Type 1 diabetes mellitus with hyperglycemia

## 2017-07-08 NOTE — PROGRESS NOTE ADULT - PROBLEM SELECTOR PLAN 1
- increase Lantus to 40u qHS  - increase Humalog to 18u TID   - c/w mod CS with meals and at bedtime  - discharge plan: basal/bolus, dose TBD  Outpatient endocrine follow up [phone].

## 2017-07-08 NOTE — PROGRESS NOTE ADULT - SUBJECTIVE AND OBJECTIVE BOX
Patient is a 32y old  Male who presents with a chief complaint of "My foot got reinfected." (07 Jul 2017 12:24)      SUBJECTIVE / OVERNIGHT EVENTS: Patient without complaints this AM. Denies any LE pain or discomfort. No nausea, vomiting, abdominal pain, diarrhea, constipation.     MEDICATIONS  (STANDING):  piperacillin/tazobactam IVPB. 3.375 Gram(s) IV Intermittent every 8 hours  sodium chloride 0.45%. 1000 milliLiter(s) (75 mL/Hr) IV Continuous <Continuous>  insulin lispro (HumaLOG) corrective regimen sliding scale   SubCutaneous three times a day before meals  insulin lispro (HumaLOG) corrective regimen sliding scale   SubCutaneous at bedtime  dextrose 5%. 1000 milliLiter(s) (50 mL/Hr) IV Continuous <Continuous>  dextrose 50% Injectable 12.5 Gram(s) IV Push once  dextrose 50% Injectable 25 Gram(s) IV Push once  dextrose 50% Injectable 25 Gram(s) IV Push once  lactobacillus acidophilus 1 Tablet(s) Oral daily  insulin glargine Injectable (LANTUS) 33 Unit(s) SubCutaneous at bedtime  insulin lispro Injectable (HumaLOG) 14 Unit(s) SubCutaneous three times a day before meals  vancomycin  IVPB 1250 milliGRAM(s) IV Intermittent every 12 hours    MEDICATIONS  (PRN):  acetaminophen   Tablet. 650 milliGRAM(s) Oral every 6 hours PRN Mild Pain (1 - 3)  oxyCODONE    IR 5 milliGRAM(s) Oral every 4 hours PRN Moderate Pain (4 - 6)  oxyCODONE    IR 10 milliGRAM(s) Oral every 4 hours PRN Severe Pain (7 - 10)  dextrose Gel 1 Dose(s) Oral once PRN Blood Glucose LESS THAN 70 milliGRAM(s)/deciliter  glucagon  Injectable 1 milliGRAM(s) IntraMuscular once PRN Glucose LESS THAN 70 milligrams/deciliter      Vital Signs Last 24 Hrs  T(C): 36.8 (07-08-17 @ 06:32), Max: 37.1 (07-07-17 @ 21:18)  HR: 62 (07-08-17 @ 06:32) (62 - 71)  BP: 121/64 (07-08-17 @ 06:32) (109/66 - 121/64)  RR: 17 (07-08-17 @ 06:32) (17 - 17)  SpO2: 100% (07-08-17 @ 06:32) (98% - 100%)  CAPILLARY BLOOD GLUCOSE  281 (08 Jul 2017 11:40)  294 (08 Jul 2017 08:19)  271 (07 Jul 2017 21:18)  175 (07 Jul 2017 17:03)        I&O's Summary      PHYSICAL EXAM:  GENERAL: NAD, well-developed  HEAD:  Atraumatic, Normocephalic  EYES: EOMI, PERRLA, conjunctiva and sclera clear  NECK: Supple, No JVD  CHEST/LUNG: Clear to auscultation bilaterally; No wheeze  HEART: Regular rate and rhythm; No murmurs, rubs, or gallops  ABDOMEN: Soft, Nontender, Nondistended; Bowel sounds present  EXTREMITIES:  No clubbing, cyanosis, or edema, Right foot bandage c/d/i, Wound vac in place draining serous fluid  PSYCH: AAOx3  NEUROLOGY: non-focal  SKIN: No rashes or lesions    LABS:                        13.6   7.73  )-----------( 308      ( 08 Jul 2017 05:30 )             40.9     07-08    134<L>  |  96<L>  |  17  ----------------------------<  298<H>  4.4   |  22  |  0.73    Ca    9.4      08 Jul 2017 05:30        RADIOLOGY & ADDITIONAL TESTS:    Imaging Personally Reviewed:    Consultant(s) Notes Reviewed: Endocrinology, Podiatry, ID    Care Discussed with Consultants/Other Providers:    Assessment and Plan: Patient is a 32y old  Male who presents with a chief complaint of "My foot got reinfected." (07 Jul 2017 12:24)      SUBJECTIVE / OVERNIGHT EVENTS: Patient without complaints this AM. Denies any LE pain or discomfort. No nausea, vomiting, abdominal pain, diarrhea, constipation.     MEDICATIONS  (STANDING):  piperacillin/tazobactam IVPB. 3.375 Gram(s) IV Intermittent every 8 hours  sodium chloride 0.45%. 1000 milliLiter(s) (75 mL/Hr) IV Continuous <Continuous>  insulin lispro (HumaLOG) corrective regimen sliding scale   SubCutaneous three times a day before meals  insulin lispro (HumaLOG) corrective regimen sliding scale   SubCutaneous at bedtime  dextrose 5%. 1000 milliLiter(s) (50 mL/Hr) IV Continuous <Continuous>  dextrose 50% Injectable 12.5 Gram(s) IV Push once  dextrose 50% Injectable 25 Gram(s) IV Push once  dextrose 50% Injectable 25 Gram(s) IV Push once  lactobacillus acidophilus 1 Tablet(s) Oral daily  insulin glargine Injectable (LANTUS) 33 Unit(s) SubCutaneous at bedtime  insulin lispro Injectable (HumaLOG) 14 Unit(s) SubCutaneous three times a day before meals  vancomycin  IVPB 1250 milliGRAM(s) IV Intermittent every 12 hours    MEDICATIONS  (PRN):  acetaminophen   Tablet. 650 milliGRAM(s) Oral every 6 hours PRN Mild Pain (1 - 3)  oxyCODONE    IR 5 milliGRAM(s) Oral every 4 hours PRN Moderate Pain (4 - 6)  oxyCODONE    IR 10 milliGRAM(s) Oral every 4 hours PRN Severe Pain (7 - 10)  dextrose Gel 1 Dose(s) Oral once PRN Blood Glucose LESS THAN 70 milliGRAM(s)/deciliter  glucagon  Injectable 1 milliGRAM(s) IntraMuscular once PRN Glucose LESS THAN 70 milligrams/deciliter      Vital Signs Last 24 Hrs  T(C): 36.8 (07-08-17 @ 06:32), Max: 37.1 (07-07-17 @ 21:18)  HR: 62 (07-08-17 @ 06:32) (62 - 71)  BP: 121/64 (07-08-17 @ 06:32) (109/66 - 121/64)  RR: 17 (07-08-17 @ 06:32) (17 - 17)  SpO2: 100% (07-08-17 @ 06:32) (98% - 100%)  CAPILLARY BLOOD GLUCOSE  281 (08 Jul 2017 11:40)  294 (08 Jul 2017 08:19)  271 (07 Jul 2017 21:18)  175 (07 Jul 2017 17:03)        I&O's Summary      PHYSICAL EXAM:  GENERAL: NAD, well-developed  HEAD:  Atraumatic, Normocephalic  EYES: EOMI, PERRLA, conjunctiva and sclera clear  NECK: Supple, No JVD  CHEST/LUNG: Clear to auscultation bilaterally; No wheeze  HEART: Regular rate and rhythm; No murmurs, rubs, or gallops  ABDOMEN: Soft, Nontender, Nondistended; Bowel sounds present  EXTREMITIES:  No clubbing, cyanosis, or edema, Right foot bandage c/d/i, Wound vac in place draining serous fluid  PSYCH: AAOx3  NEUROLOGY: non-focal  SKIN: No rashes or lesions    LABS:                        13.6   7.73  )-----------( 308      ( 08 Jul 2017 05:30 )             40.9     07-08    134<L>  |  96<L>  |  17  ----------------------------<  298<H>  4.4   |  22  |  0.73    Ca    9.4      08 Jul 2017 05:30    ANION GAP=16        RADIOLOGY & ADDITIONAL TESTS:    Imaging Personally Reviewed:    Consultant(s) Notes Reviewed: Endocrinology, Podiatry, ID    Care Discussed with Consultants/Other Providers:    Assessment and Plan: Patient is a 32y old  Male who presents with a chief complaint of "My foot got reinfected." (07 Jul 2017 12:24)      SUBJECTIVE / OVERNIGHT EVENTS: Patient without complaints this AM. Denies any LE pain or discomfort. No nausea, vomiting, abdominal pain, diarrhea, constipation.     MEDICATIONS  (STANDING):  piperacillin/tazobactam IVPB. 3.375 Gram(s) IV Intermittent every 8 hours  sodium chloride 0.45%. 1000 milliLiter(s) (75 mL/Hr) IV Continuous <Continuous>  insulin lispro (HumaLOG) corrective regimen sliding scale   SubCutaneous three times a day before meals  insulin lispro (HumaLOG) corrective regimen sliding scale   SubCutaneous at bedtime  dextrose 5%. 1000 milliLiter(s) (50 mL/Hr) IV Continuous <Continuous>  dextrose 50% Injectable 12.5 Gram(s) IV Push once  dextrose 50% Injectable 25 Gram(s) IV Push once  dextrose 50% Injectable 25 Gram(s) IV Push once  lactobacillus acidophilus 1 Tablet(s) Oral daily  insulin glargine Injectable (LANTUS) 33 Unit(s) SubCutaneous at bedtime  insulin lispro Injectable (HumaLOG) 14 Unit(s) SubCutaneous three times a day before meals  vancomycin  IVPB 1250 milliGRAM(s) IV Intermittent every 12 hours    MEDICATIONS  (PRN):  acetaminophen   Tablet. 650 milliGRAM(s) Oral every 6 hours PRN Mild Pain (1 - 3)  oxyCODONE    IR 5 milliGRAM(s) Oral every 4 hours PRN Moderate Pain (4 - 6)  oxyCODONE    IR 10 milliGRAM(s) Oral every 4 hours PRN Severe Pain (7 - 10)  dextrose Gel 1 Dose(s) Oral once PRN Blood Glucose LESS THAN 70 milliGRAM(s)/deciliter  glucagon  Injectable 1 milliGRAM(s) IntraMuscular once PRN Glucose LESS THAN 70 milligrams/deciliter      Vital Signs Last 24 Hrs  T(C): 36.8 (07-08-17 @ 06:32), Max: 37.1 (07-07-17 @ 21:18)  HR: 62 (07-08-17 @ 06:32) (62 - 71)  BP: 121/64 (07-08-17 @ 06:32) (109/66 - 121/64)  RR: 17 (07-08-17 @ 06:32) (17 - 17)  SpO2: 100% (07-08-17 @ 06:32) (98% - 100%)  CAPILLARY BLOOD GLUCOSE  281 (08 Jul 2017 11:40)  294 (08 Jul 2017 08:19)  271 (07 Jul 2017 21:18)  175 (07 Jul 2017 17:03)        I&O's Summary      PHYSICAL EXAM:  GENERAL: NAD, well-developed  HEAD:  Atraumatic, Normocephalic  EYES: EOMI, PERRLA, conjunctiva and sclera clear  NECK: Supple, No JVD  CHEST/LUNG: Clear to auscultation bilaterally; No wheeze  HEART: Regular rate and rhythm; No murmurs, rubs, or gallops  ABDOMEN: Soft, Nontender, Nondistended; Bowel sounds present  EXTREMITIES:  No clubbing, cyanosis, or edema, Right foot bandage c/d/i, Wound vac in place draining serous fluid  PSYCH: AAOx3  NEUROLOGY: non-focal  SKIN: No rashes or lesions    LABS:                        13.6   7.73  )-----------( 308      ( 08 Jul 2017 05:30 )             40.9     07-08    134<L>  |  96<L>  |  17  ----------------------------<  298<H>  4.4   |  22  |  0.73    Ca    9.4      08 Jul 2017 05:30    ANION GAP=16      WOUND Cx: MRSA    RADIOLOGY & ADDITIONAL TESTS:    Imaging Personally Reviewed:    Consultant(s) Notes Reviewed: Endocrinology, Podiatry, ID    Care Discussed with Consultants/Other Providers:    Assessment and Plan:

## 2017-07-08 NOTE — PROGRESS NOTE ADULT - ASSESSMENT
29 yo M with uncontrolled DM1 (A1c=9.0), c/b diabetic retinopathy, hx gaseous gangrene of right foot presenting with sepsis 2/2 RLE diabetic foot ulcer. 29 yo M with uncontrolled DM1 (A1c=9.0), c/b diabetic retinopathy, hx gaseous gangrene of right foot presenting with sepsis 2/2 RLE diabetic foot ulcer likely MRSA.

## 2017-07-08 NOTE — PROGRESS NOTE ADULT - PROBLEM SELECTOR PLAN 2
Uncontrolled type 2 DM with diabetic foot ulcer   - diagnosed ~ age 7 years old.  FH of DM (parents, grandparent)  - hyperglycemic to 202 on CMP (but subsequent FS reportedly 91 and 97)  - Endocrinology recs appreciated, will place patient on Lantus 25 units qhs and 8 units TID  - A1C 9   - Consistent carb diet  - foot ulcer being managed by Podiatry team -Endocrinology recommendations appreciated. Patient still with hyperglycemia  -Will f/u further endocrine recs. C/w Lantus 33 U QHS and Humalog 14/14/14 for now  - Patient has diabetic retinopathy. Outpatient ophtho f/u -Endocrinology recommendations appreciated. Patient still with hyperglycemia  -Will f/u further endocrine recs.  -Increase to Lantus 40 U QHS and Humalog 16/16/16 for now  - Patient has diabetic retinopathy. Outpatient ophtho f/u

## 2017-07-09 LAB
BUN SERPL-MCNC: 22 MG/DL — SIGNIFICANT CHANGE UP (ref 7–23)
CALCIUM SERPL-MCNC: 9.8 MG/DL — SIGNIFICANT CHANGE UP (ref 8.4–10.5)
CHLORIDE SERPL-SCNC: 95 MMOL/L — LOW (ref 98–107)
CO2 SERPL-SCNC: 27 MMOL/L — SIGNIFICANT CHANGE UP (ref 22–31)
CREAT SERPL-MCNC: 0.86 MG/DL — SIGNIFICANT CHANGE UP (ref 0.5–1.3)
GLUCOSE SERPL-MCNC: 284 MG/DL — HIGH (ref 70–99)
HCT VFR BLD CALC: 42.3 % — SIGNIFICANT CHANGE UP (ref 39–50)
HGB BLD-MCNC: 13.9 G/DL — SIGNIFICANT CHANGE UP (ref 13–17)
MCHC RBC-ENTMCNC: 27.1 PG — SIGNIFICANT CHANGE UP (ref 27–34)
MCHC RBC-ENTMCNC: 32.9 % — SIGNIFICANT CHANGE UP (ref 32–36)
MCV RBC AUTO: 82.5 FL — SIGNIFICANT CHANGE UP (ref 80–100)
NRBC # FLD: 0 — SIGNIFICANT CHANGE UP
PLATELET # BLD AUTO: 373 K/UL — SIGNIFICANT CHANGE UP (ref 150–400)
PMV BLD: 9.4 FL — SIGNIFICANT CHANGE UP (ref 7–13)
POTASSIUM SERPL-MCNC: 4.1 MMOL/L — SIGNIFICANT CHANGE UP (ref 3.5–5.3)
POTASSIUM SERPL-SCNC: 4.1 MMOL/L — SIGNIFICANT CHANGE UP (ref 3.5–5.3)
RBC # BLD: 5.13 M/UL — SIGNIFICANT CHANGE UP (ref 4.2–5.8)
RBC # FLD: 12.2 % — SIGNIFICANT CHANGE UP (ref 10.3–14.5)
SODIUM SERPL-SCNC: 136 MMOL/L — SIGNIFICANT CHANGE UP (ref 135–145)
WBC # BLD: 8.58 K/UL — SIGNIFICANT CHANGE UP (ref 3.8–10.5)
WBC # FLD AUTO: 8.58 K/UL — SIGNIFICANT CHANGE UP (ref 3.8–10.5)

## 2017-07-09 PROCEDURE — 99232 SBSQ HOSP IP/OBS MODERATE 35: CPT

## 2017-07-09 PROCEDURE — 99233 SBSQ HOSP IP/OBS HIGH 50: CPT

## 2017-07-09 RX ORDER — INSULIN GLARGINE 100 [IU]/ML
46 INJECTION, SOLUTION SUBCUTANEOUS AT BEDTIME
Qty: 0 | Refills: 0 | Status: DISCONTINUED | OUTPATIENT
Start: 2017-07-09 | End: 2017-07-11

## 2017-07-09 RX ADMIN — Medication 18 UNIT(S): at 12:30

## 2017-07-09 RX ADMIN — Medication 6: at 08:58

## 2017-07-09 RX ADMIN — PIPERACILLIN AND TAZOBACTAM 25 GRAM(S): 4; .5 INJECTION, POWDER, LYOPHILIZED, FOR SOLUTION INTRAVENOUS at 07:24

## 2017-07-09 RX ADMIN — Medication 1 TABLET(S): at 12:30

## 2017-07-09 RX ADMIN — Medication 166.67 MILLIGRAM(S): at 05:46

## 2017-07-09 RX ADMIN — PIPERACILLIN AND TAZOBACTAM 25 GRAM(S): 4; .5 INJECTION, POWDER, LYOPHILIZED, FOR SOLUTION INTRAVENOUS at 15:58

## 2017-07-09 RX ADMIN — Medication 2: at 21:51

## 2017-07-09 RX ADMIN — Medication 18 UNIT(S): at 08:58

## 2017-07-09 RX ADMIN — Medication 4: at 12:30

## 2017-07-09 RX ADMIN — INSULIN GLARGINE 46 UNIT(S): 100 INJECTION, SOLUTION SUBCUTANEOUS at 21:51

## 2017-07-09 RX ADMIN — Medication 166.67 MILLIGRAM(S): at 17:55

## 2017-07-09 NOTE — PROGRESS NOTE ADULT - ASSESSMENT
MRSA right foot infection in diabetic s/p recent 5th toe amp for OM/ gangrene. Local infection vs residual OM.  Podiatry following.  Would continue Vanco IV  dose adjusted for low vanco level  d/c zosyn.

## 2017-07-09 NOTE — PROGRESS NOTE ADULT - SUBJECTIVE AND OBJECTIVE BOX
Follow Up: right foot infection post recent 5th toe amp for OM/ gangrene    Inverval History/ROS:   vac placed. no fever, chills    Allergies  No Known Allergies        ANTIMICROBIALS:  piperacillin/tazobactam IVPB. 3.375 every 8 hours  vancomycin  IVPB 1250 every 12 hours      OTHER MEDS:  sodium chloride 0.45%. 1000 milliLiter(s) IV Continuous <Continuous>  acetaminophen   Tablet. 650 milliGRAM(s) Oral every 6 hours PRN  oxyCODONE    IR 5 milliGRAM(s) Oral every 4 hours PRN  oxyCODONE    IR 10 milliGRAM(s) Oral every 4 hours PRN  insulin lispro (HumaLOG) corrective regimen sliding scale   SubCutaneous three times a day before meals  insulin lispro (HumaLOG) corrective regimen sliding scale   SubCutaneous at bedtime  dextrose 5%. 1000 milliLiter(s) IV Continuous <Continuous>  dextrose Gel 1 Dose(s) Oral once PRN  dextrose 50% Injectable 12.5 Gram(s) IV Push once  dextrose 50% Injectable 25 Gram(s) IV Push once  dextrose 50% Injectable 25 Gram(s) IV Push once  glucagon  Injectable 1 milliGRAM(s) IntraMuscular once PRN  lactobacillus acidophilus 1 Tablet(s) Oral daily  insulin lispro Injectable (HumaLOG) 18 Unit(s) SubCutaneous three times a day before meals  insulin glargine Injectable (LANTUS) 46 Unit(s) SubCutaneous at bedtime      Vital Signs Last 24 Hrs  T(C): 36.7 (09 Jul 2017 13:54), Max: 36.8 (08 Jul 2017 20:45)  T(F): 98.1 (09 Jul 2017 13:54), Max: 98.2 (08 Jul 2017 20:45)  HR: 84 (09 Jul 2017 13:54) (73 - 84)  BP: 123/75 (09 Jul 2017 13:54) (105/68 - 123/75)  BP(mean): --  RR: 18 (09 Jul 2017 13:54) (18 - 18)  SpO2: 99% (09 Jul 2017 13:54) (99% - 99%)    PHYSICAL EXAM:  General: [x ] non-toxic  HEAD/EYES: [ ] PERRL [x ] white sclera   ENT:  [ ] normal [x ] supple [ ] thrush  Cardiovascular:   [ ] murmur [ x] normal   Respiratory:  [x ] clear to ausculation bilaterally  GI:  [x ] soft, non-tender, normal bowel sounds  :  [ ] méndez [ ] no CVA tenderness   Musculoskeletal:  right foot vac toes warm  Neurologic:  [x ] non-focal exam   Skin:  [x ] no rash  Lymph: [ ] no lymphadenopathy  Psychiatric:  x ] appropriate affect [ x] alert & oriented  Lines:  [x ] no phlebitis left antecubital                                13.9   8.58  )-----------( 373      ( 09 Jul 2017 06:00 )             42.3       07-09    136  |  95<L>  |  22  ----------------------------<  284<H>  4.1   |  27  |  0.86    Ca    9.8      09 Jul 2017 06:00            MICROBIOLOGY:Culture - Abscess with Gram Stain (07.06.17 @ 01:32)    -  Ciprofloxacin: I 2 ARISTIDES    -  Clindamycin: S <=0.5 ARISTIDES    -  Moxifloxacin(Aerobic): S <=0.5 ARISTIDES    -  Trimethoprim/Sulfamethoxazole: S <=0.5/9.5 ARISTIDES    -  Daptomycin: S <=0.5 ARISTIDES    -  Erythromycin: R >4 ARISTIDES    -  Oxacillin: R >2 ARISTIDES    -  Tetra/Doxy: S <=4 ARISTIDES    -  Vancomycin: S 1 ARISTIDES    -  Cefazolin: R 16 ARISTIDES    -  Gentamicin: S <=4 ARISTIDES    -  Linezolid: S 2 ARISTIDES    -  Penicillin: R >8 ARISTIDES    -  Rifampin: S <=1 ARISTIDES    Specimen Source: OTHER    Gram Stain Result:   WBC White Blood Cells  QNTY CELLS IN GRAM STAIN: FEW (2+)  NOS^No Organisms Seen    Culture Results:   RESULT CALLED TO: LION GUEVARA RN/ERICA  DATE / TIME CALLED: 07/08/17 1340  CALLED BY: ARNALDO MASCORRO    Culture Results:   STAU^Staphylococcus aureus    Organism Identification: Staph. aureus *MRSA*    Organism: Staph. aureus *MRSA*  OXICILLIN-RESISTANT staphylococci should be regarded as  RESISTANT to ALL other Beta-Lactams regardless of the  in-vitro results obtained.  These include: ALL  Penicillins, Cephalosporins, Amoxicillin-clavulanic  acid, Ticarcillin-clavulanic acid,  Ampicillin-sulbactam, and Imipenem.    Method Type: MICROSCAN POS COMBO 34                Culture - Blood (07.07.17 @ 17:22)    Culture - Blood:   NO ORGANISMS ISOLATED  NO ORGANISMS ISOLATED AT 48 HRS.    Specimen Source: BLOOD VENOUS    RADIOLOGY:  < from: Xray Foot AP + Lateral + Oblique, Right (07.05.17 @ 21:00) >  noted.      IMPRESSION:  Right foot status post fifth metatarsal amputation without   evidence of soft tissue gas    < end of copied text >

## 2017-07-09 NOTE — PROGRESS NOTE ADULT - PROBLEM SELECTOR PLAN 2
-Endocrinology recommendations appreciated. Patient still with hyperglycemia  -Will f/u further endocrine recs.  -Increase to Lantus 46U QHS and c/w Humalog 18/18/18 for now  - Patient has diabetic retinopathy. Outpatient ophtho f/u

## 2017-07-09 NOTE — PROGRESS NOTE ADULT - PROBLEM SELECTOR PLAN 1
BG remain elevated. target fs 100-180mg/dl  c/w Humalog to 18/18/18  Increase Lantus to 46units qhs  DC on optimized basal bolus plan (stop 70/30).  Outpatient endocrine follow up [phone]. BG remain elevated. target fs 100-180mg/dl  c/w Humalog to 18/18/18  Increase Lantus to 46units qhs  c/w moderate correction scale ac/hs  DC plan- anticpate basal bolus insulin.  discontinue 70/30.  F/u at endocrine office - appt should be scheduled prior to discharge [phone]

## 2017-07-09 NOTE — PROGRESS NOTE ADULT - SUBJECTIVE AND OBJECTIVE BOX
Chief Complaint: DM1    History: tolerating po, no hypoglycemia    MEDICATIONS  (STANDING):  piperacillin/tazobactam IVPB. 3.375 Gram(s) IV Intermittent every 8 hours  sodium chloride 0.45%. 1000 milliLiter(s) (75 mL/Hr) IV Continuous <Continuous>  insulin lispro (HumaLOG) corrective regimen sliding scale   SubCutaneous three times a day before meals  insulin lispro (HumaLOG) corrective regimen sliding scale   SubCutaneous at bedtime  dextrose 5%. 1000 milliLiter(s) (50 mL/Hr) IV Continuous <Continuous>  dextrose 50% Injectable 12.5 Gram(s) IV Push once  dextrose 50% Injectable 25 Gram(s) IV Push once  dextrose 50% Injectable 25 Gram(s) IV Push once  lactobacillus acidophilus 1 Tablet(s) Oral daily  insulin glargine Injectable (LANTUS) 33 Unit(s) SubCutaneous at bedtime  insulin lispro Injectable (HumaLOG) 14 Unit(s) SubCutaneous three times a day before meals  vancomycin  IVPB 1000 milliGRAM(s) IV Intermittent every 12 hours    MEDICATIONS  (PRN):  acetaminophen   Tablet. 650 milliGRAM(s) Oral every 6 hours PRN Mild Pain (1 - 3)  oxyCODONE    IR 5 milliGRAM(s) Oral every 4 hours PRN Moderate Pain (4 - 6)  oxyCODONE    IR 10 milliGRAM(s) Oral every 4 hours PRN Severe Pain (7 - 10)  dextrose Gel 1 Dose(s) Oral once PRN Blood Glucose LESS THAN 70 milliGRAM(s)/deciliter  glucagon  Injectable 1 milliGRAM(s) IntraMuscular once PRN Glucose LESS THAN 70 milligrams/deciliter      Allergies    No Known Allergies    Intolerances      Review of Systems:  Constitutional: No fever  Eyes: No blurry vision  Neuro: No tremors  HEENT: No pain  Cardiovascular: No chest pain, palpitations  Respiratory: No SOB, no cough  GI: No nausea, vomiting, abdominal pain  : No dysuria  Skin: no rash  Psych: no depression  Endocrine: no polyuria, polydipsia  Hem/lymph: no swelling  Osteoporosis: no fractures    ALL OTHER SYSTEMS REVIEWED AND NEGATIVE    PHYSICAL EXAM:  Vital Signs Last 24 Hrs  T(C): 36.7 (09 Jul 2017 05:41), Max: 36.9 (08 Jul 2017 13:59)  T(F): 98 (09 Jul 2017 05:41), Max: 98.4 (08 Jul 2017 13:59)  HR: 75 (09 Jul 2017 05:41) (69 - 75)  BP: 113/76 (09 Jul 2017 05:41) (105/68 - 114/70)  BP(mean): --  RR: 18 (09 Jul 2017 05:41) (18 - 18)  SpO2: 99% (09 Jul 2017 05:41) (98% - 99%)  GENERAL: NAD, well-groomed, well-developed  EYES: No proptosis, no lid lag, anicteric  HEENT:  Atraumatic, Normocephalic, moist mucous membranes  SKIN: RLE with wound vac  NEURO: extraocular movements intact, no tremor  PSYCH: Alert and oriented x 3, normal affect, normal mood    CAPILLARY BLOOD GLUCOSE  288 (09 Jul 2017 08:08)  242 (08 Jul 2017 22:30)  129 (08 Jul 2017 16:42)  281 (08 Jul 2017 11:40)    267 (07-07 @ 12:04)  264 (07-07 @ 08:39)  273 (07-06 @ 21:57)  222 (07-06 @ 16:34)        07-09    136  |  95<L>  |  22  ----------------------------<  284<H>  4.1   |  27  |  0.86    Ca    9.8      09 Jul 2017 06:00      Thyroid Function Tests:  07-06 @ 06:05 TSH 2.89 FreeT4 -- T3 -- Anti TPO -- Anti Thyroglobulin Ab -- TSI --      Hemoglobin A1C, Whole Blood: 9.0 % <H> [4.0 - 5.6] (07-06-17 @ 06:05) Chief Complaint: DM1    History: tolerating po, denies s/s of hypoglycemia.  Ate pancakes, cereal, and eggs for breakfast.    MEDICATIONS  (STANDING):  sodium chloride 0.45%. 1000 milliLiter(s) (75 mL/Hr) IV Continuous <Continuous>  insulin lispro (HumaLOG) corrective regimen sliding scale   SubCutaneous three times a day before meals  insulin lispro (HumaLOG) corrective regimen sliding scale   SubCutaneous at bedtime  dextrose 5%. 1000 milliLiter(s) (50 mL/Hr) IV Continuous <Continuous>  dextrose 50% Injectable 12.5 Gram(s) IV Push once  dextrose 50% Injectable 25 Gram(s) IV Push once  insulin glargine Injectable (LANTUS) 40 Unit(s) SubCutaneous at bedtime  insulin lispro Injectable (HumaLOG) 18 Unit(s) SubCutaneous three times a day before meals    MEDICATIONS  (PRN):  dextrose Gel 1 Dose(s) Oral once PRN Blood Glucose LESS THAN 70 milliGRAM(s)/deciliter  glucagon  Injectable 1 milliGRAM(s) IntraMuscular once PRN Glucose LESS THAN 70 milligrams/deciliter      Allergies    No Known Allergies    Intolerances      Review of Systems:  As above and ALL OTHER SYSTEMS REVIEWED AND NEGATIVE    PHYSICAL EXAM:  Vital Signs Last 24 Hrs  T(C): 36.7 (09 Jul 2017 05:41), Max: 36.9 (08 Jul 2017 13:59)  T(F): 98 (09 Jul 2017 05:41), Max: 98.4 (08 Jul 2017 13:59)  HR: 75 (09 Jul 2017 05:41) (69 - 75)  BP: 113/76 (09 Jul 2017 05:41) (105/68 - 114/70)  BP(mean): --  RR: 18 (09 Jul 2017 05:41) (18 - 18)  SpO2: 99% (09 Jul 2017 05:41) (98% - 99%)  GENERAL: NAD, well-groomed, well-developed  EYES: No proptosis, no lid lag, anicteric  HEENT:  Atraumatic, Normocephalic, moist mucous membranes  SKIN: RLE with wound vac  NEURO: extraocular movements intact, no tremor, A & O x3      CAPILLARY BLOOD GLUCOSE  288 (09 Jul 2017 08:08)  242 (08 Jul 2017 22:30)  129 (08 Jul 2017 16:42)  281 (08 Jul 2017 11:40)    267 (07-07 @ 12:04)  264 (07-07 @ 08:39)  273 (07-06 @ 21:57)  222 (07-06 @ 16:34)        07-09    136  |  95<L>  |  22  ----------------------------<  284<H>  4.1   |  27  |  0.86    Ca    9.8      09 Jul 2017 06:00      Thyroid Function Tests:  07-06 @ 06:05 TSH 2.89 FreeT4 -- T3 -- Anti TPO -- Anti Thyroglobulin Ab -- TSI --      Hemoglobin A1C, Whole Blood: 9.0 % <H> [4.0 - 5.6] (07-06-17 @ 06:05)

## 2017-07-09 NOTE — PROGRESS NOTE ADULT - PROBLEM SELECTOR PLAN 1
- s/p amputation of right 5th toe in May 2017  - s/p suture placement at last draining site on last Friday  - c/w Vancomycin and Zosyn as ordered  - analgesic PRN (Oxycodone 5 mg and 10 mg PRN)  - re-culture if patient has repeat fever  - f/ u further podiatry recs, may ultimately require further surgical intervention

## 2017-07-09 NOTE — PROGRESS NOTE ADULT - SUBJECTIVE AND OBJECTIVE BOX
Patient is a 32y old  Male who presents with a chief complaint of "My foot got reinfected." (07 Jul 2017 12:24)      SUBJECTIVE / OVERNIGHT EVENTS: Patient without complaints this AM. Wound vac in place. Wound culture yesterday grew MRSA.    MEDICATIONS  (STANDING):  piperacillin/tazobactam IVPB. 3.375 Gram(s) IV Intermittent every 8 hours  sodium chloride 0.45%. 1000 milliLiter(s) (75 mL/Hr) IV Continuous <Continuous>  insulin lispro (HumaLOG) corrective regimen sliding scale   SubCutaneous three times a day before meals  insulin lispro (HumaLOG) corrective regimen sliding scale   SubCutaneous at bedtime  dextrose 5%. 1000 milliLiter(s) (50 mL/Hr) IV Continuous <Continuous>  dextrose 50% Injectable 12.5 Gram(s) IV Push once  dextrose 50% Injectable 25 Gram(s) IV Push once  dextrose 50% Injectable 25 Gram(s) IV Push once  lactobacillus acidophilus 1 Tablet(s) Oral daily  vancomycin  IVPB 1250 milliGRAM(s) IV Intermittent every 12 hours  insulin lispro Injectable (HumaLOG) 18 Unit(s) SubCutaneous three times a day before meals  insulin glargine Injectable (LANTUS) 46 Unit(s) SubCutaneous at bedtime    MEDICATIONS  (PRN):  acetaminophen   Tablet. 650 milliGRAM(s) Oral every 6 hours PRN Mild Pain (1 - 3)  oxyCODONE    IR 5 milliGRAM(s) Oral every 4 hours PRN Moderate Pain (4 - 6)  oxyCODONE    IR 10 milliGRAM(s) Oral every 4 hours PRN Severe Pain (7 - 10)  dextrose Gel 1 Dose(s) Oral once PRN Blood Glucose LESS THAN 70 milliGRAM(s)/deciliter  glucagon  Injectable 1 milliGRAM(s) IntraMuscular once PRN Glucose LESS THAN 70 milligrams/deciliter        Vital Signs Last 24 Hrs  T(C): 36.7 (07-09-17 @ 05:41)  T(F): 98 (07-09-17 @ 05:41), Max: 98.4 (07-08-17 @ 13:59)  HR: 75 (07-09-17 @ 05:41) (69 - 75)  BP: 113/76 (07-09-17 @ 05:41)  BP(mean): --  RR: 18 (07-09-17 @ 05:41) (18 - 18)  SpO2: 99% (07-09-17 @ 05:41) (98% - 99%)  Wt(kg): --    PHYSICAL EXAM:  GENERAL: NAD, well-developed  HEAD:  Atraumatic, Normocephalic  EYES: EOMI, PERRLA, conjunctiva and sclera clear  NECK: Supple, No JVD  CHEST/LUNG: Clear to auscultation bilaterally; No wheeze  HEART: Regular rate and rhythm; No murmurs, rubs, or gallops  ABDOMEN: Soft, Nontender, Nondistended; Bowel sounds present  EXTREMITIES:  No clubbing, cyanosis, or edema, Right foot bandage c/d/i, Wound vac in place draining serosanguinous fluid  PSYCH: AAOx3  NEUROLOGY: non-focal  SKIN: No rashes or lesions                            13.9   8.58  )-----------( 373      ( 09 Jul 2017 06:00 )             42.3     07-09    136  |  95<L>  |  22  ----------------------------<  284<H>  4.1   |  27  |  0.86    Ca    9.8      09 Jul 2017 06:00      WOUND Cx: MRSA  Blood cultures: NTD    RADIOLOGY & ADDITIONAL TESTS: None    Imaging Personally Reviewed: None    Consultant(s) Notes Reviewed: Endocrinology    Care Discussed with Consultants/Other Providers:    Assessment and Plan:

## 2017-07-09 NOTE — PROGRESS NOTE ADULT - ASSESSMENT
29 yo M with uncontrolled DM1 (A1c=9.0), c/b diabetic retinopathy, hx gaseous gangrene of right foot presenting with sepsis 2/2 MRSA in RLE diabetic foot ulcer, possible recurrence of prior infection.

## 2017-07-10 DIAGNOSIS — Z22.322 CARRIER OR SUSPECTED CARRIER OF METHICILLIN RESISTANT STAPHYLOCOCCUS AUREUS: ICD-10-CM

## 2017-07-10 LAB
BACTERIA BLD CULT: SIGNIFICANT CHANGE UP
BACTERIA BLD CULT: SIGNIFICANT CHANGE UP
BUN SERPL-MCNC: 15 MG/DL — SIGNIFICANT CHANGE UP (ref 7–23)
CALCIUM SERPL-MCNC: 9.7 MG/DL — SIGNIFICANT CHANGE UP (ref 8.4–10.5)
CHLORIDE SERPL-SCNC: 97 MMOL/L — LOW (ref 98–107)
CO2 SERPL-SCNC: 27 MMOL/L — SIGNIFICANT CHANGE UP (ref 22–31)
CREAT SERPL-MCNC: 0.7 MG/DL — SIGNIFICANT CHANGE UP (ref 0.5–1.3)
CRP SERPL-MCNC: 22.6 MG/L — HIGH (ref 0.3–5)
ERYTHROCYTE [SEDIMENTATION RATE] IN BLOOD: 73 MM/HR — HIGH (ref 1–15)
GLUCOSE SERPL-MCNC: 151 MG/DL — HIGH (ref 70–99)
HCT VFR BLD CALC: 42 % — SIGNIFICANT CHANGE UP (ref 39–50)
HGB BLD-MCNC: 14.1 G/DL — SIGNIFICANT CHANGE UP (ref 13–17)
ISLET CELL512 AB SER-ACNC: <5 — SIGNIFICANT CHANGE UP
MCHC RBC-ENTMCNC: 28 PG — SIGNIFICANT CHANGE UP (ref 27–34)
MCHC RBC-ENTMCNC: 33.6 % — SIGNIFICANT CHANGE UP (ref 32–36)
MCV RBC AUTO: 83.3 FL — SIGNIFICANT CHANGE UP (ref 80–100)
NRBC # FLD: 0 — SIGNIFICANT CHANGE UP
PLATELET # BLD AUTO: 342 K/UL — SIGNIFICANT CHANGE UP (ref 150–400)
PMV BLD: 9.1 FL — SIGNIFICANT CHANGE UP (ref 7–13)
POTASSIUM SERPL-MCNC: 4 MMOL/L — SIGNIFICANT CHANGE UP (ref 3.5–5.3)
POTASSIUM SERPL-SCNC: 4 MMOL/L — SIGNIFICANT CHANGE UP (ref 3.5–5.3)
RBC # BLD: 5.04 M/UL — SIGNIFICANT CHANGE UP (ref 4.2–5.8)
RBC # FLD: 12.3 % — SIGNIFICANT CHANGE UP (ref 10.3–14.5)
SODIUM SERPL-SCNC: 139 MMOL/L — SIGNIFICANT CHANGE UP (ref 135–145)
VANCOMYCIN TROUGH SERPL-MCNC: 10.9 UG/ML — SIGNIFICANT CHANGE UP (ref 10–20)
WBC # BLD: 8.32 K/UL — SIGNIFICANT CHANGE UP (ref 3.8–10.5)
WBC # FLD AUTO: 8.32 K/UL — SIGNIFICANT CHANGE UP (ref 3.8–10.5)

## 2017-07-10 PROCEDURE — 99233 SBSQ HOSP IP/OBS HIGH 50: CPT

## 2017-07-10 PROCEDURE — 99232 SBSQ HOSP IP/OBS MODERATE 35: CPT

## 2017-07-10 RX ORDER — ENOXAPARIN SODIUM 100 MG/ML
46 INJECTION SUBCUTANEOUS
Qty: 1 | Refills: 4 | OUTPATIENT
Start: 2017-07-10

## 2017-07-10 RX ADMIN — Medication 2: at 09:03

## 2017-07-10 RX ADMIN — Medication 166.67 MILLIGRAM(S): at 18:20

## 2017-07-10 RX ADMIN — Medication 166.67 MILLIGRAM(S): at 05:46

## 2017-07-10 RX ADMIN — Medication 1 TABLET(S): at 12:58

## 2017-07-10 RX ADMIN — Medication 18 UNIT(S): at 12:56

## 2017-07-10 RX ADMIN — Medication 6: at 22:17

## 2017-07-10 RX ADMIN — INSULIN GLARGINE 46 UNIT(S): 100 INJECTION, SOLUTION SUBCUTANEOUS at 22:18

## 2017-07-10 RX ADMIN — Medication 18 UNIT(S): at 09:04

## 2017-07-10 NOTE — PROGRESS NOTE ADULT - PROBLEM SELECTOR PLAN 1
-cont vancomycin  -appreciate Podiatry input - low concern for bone infection  -local care per podiatry  -will change to oral abx when ready for DC

## 2017-07-10 NOTE — PROGRESS NOTE ADULT - ATTENDING COMMENTS
Dispo- Pending final ID recs and setting up wound vac for home Dispo- Pending final ID recs and setting up wound vac for home    DISCHARGE TIME- 35 MINUTES SPENT PREPARING DISCHARGE, INCLUDING PAPERWORK, MEDICATION RECONCILIATION, AND COUNSELLING OF PATIENT.

## 2017-07-10 NOTE — PROGRESS NOTE ADULT - SUBJECTIVE AND OBJECTIVE BOX
Patient is a 32y old  Male who presents with a chief complaint of "My foot got reinfected." (06 Jul 2017 00:50)       5/28 s/p RF partial fifth ray resection, dehiscence    INTERVAL HPI/OVERNIGHT EVENTS:  Patient seen and evaluated at bedside.  Pt is resting comfortable in NAD. Denies N/V/F/C.    Allergies    No Known Allergies    Intolerances        Vital Signs Last 24 Hrs  T(C): 36.4 (10 Jul 2017 05:44), Max: 36.8 (09 Jul 2017 21:36)  T(F): 97.5 (10 Jul 2017 05:44), Max: 98.2 (09 Jul 2017 21:36)  HR: 70 (10 Jul 2017 05:44) (70 - 85)  BP: 109/68 (10 Jul 2017 05:44) (105/60 - 123/75)  BP(mean): --  RR: 18 (10 Jul 2017 05:44) (18 - 18)  SpO2: 99% (10 Jul 2017 05:44) (99% - 100%)                          14.1   8.32  )-----------( 342      ( 10 Jul 2017 04:20 )             42.0       07-10    139  |  97<L>  |  15  ----------------------------<  151<H>  4.0   |  27  |  0.70    Ca    9.7      10 Jul 2017 04:20      Lower Extremity Physical Exam:  Vasular: Pedal pulses palpable, B/L.   Neuro: Epicritic sensation diminished to the level of the foot, B/L.  Musculoskeletal/Ortho: S/p 5th ray amp, R foot  Skin:  R sx incision open, no drainage, no malodor, no PTB, periwound erythema and swelling significantly reduced, wound base granulating.

## 2017-07-10 NOTE — PROGRESS NOTE ADULT - SUBJECTIVE AND OBJECTIVE BOX
JEOVANNY BARBOUR 32y [id number]    Patient is a 32y old  Male who presents with a chief complaint of "My foot got reinfected." (07 Jul 2017 12:24)      Follow Up/CC:  MRSA    Interval History/ROS: Feels ok    Allergies    No Known Allergies    Intolerances        ANTIMICROBIALS:  vancomycin  IVPB 1250 every 12 hours      MEDICATIONS  (STANDING):  sodium chloride 0.45%. 1000 milliLiter(s) (75 mL/Hr) IV Continuous <Continuous>  insulin lispro (HumaLOG) corrective regimen sliding scale   SubCutaneous three times a day before meals  insulin lispro (HumaLOG) corrective regimen sliding scale   SubCutaneous at bedtime  dextrose 5%. 1000 milliLiter(s) (50 mL/Hr) IV Continuous <Continuous>  dextrose 50% Injectable 12.5 Gram(s) IV Push once  dextrose 50% Injectable 25 Gram(s) IV Push once  dextrose 50% Injectable 25 Gram(s) IV Push once  lactobacillus acidophilus 1 Tablet(s) Oral daily  vancomycin  IVPB 1250 milliGRAM(s) IV Intermittent every 12 hours  insulin lispro Injectable (HumaLOG) 18 Unit(s) SubCutaneous three times a day before meals  insulin glargine Injectable (LANTUS) 46 Unit(s) SubCutaneous at bedtime    MEDICATIONS  (PRN):  acetaminophen   Tablet. 650 milliGRAM(s) Oral every 6 hours PRN Mild Pain (1 - 3)  oxyCODONE    IR 5 milliGRAM(s) Oral every 4 hours PRN Moderate Pain (4 - 6)  oxyCODONE    IR 10 milliGRAM(s) Oral every 4 hours PRN Severe Pain (7 - 10)  dextrose Gel 1 Dose(s) Oral once PRN Blood Glucose LESS THAN 70 milliGRAM(s)/deciliter  glucagon  Injectable 1 milliGRAM(s) IntraMuscular once PRN Glucose LESS THAN 70 milligrams/deciliter        Vital Signs Last 24 Hrs  T(C): 36.4 (10 Jul 2017 13:15), Max: 36.8 (09 Jul 2017 21:36)  T(F): 97.5 (10 Jul 2017 13:15), Max: 98.2 (09 Jul 2017 21:36)  HR: 66 (10 Jul 2017 13:15) (66 - 85)  BP: 123/79 (10 Jul 2017 13:15) (105/60 - 123/79)  BP(mean): --  RR: 18 (10 Jul 2017 13:15) (18 - 18)  SpO2: 100% (10 Jul 2017 13:15) (99% - 100%)    CBC Full  -  ( 10 Jul 2017 04:20 )  WBC Count : 8.32 K/uL  Hemoglobin : 14.1 g/dL  Hematocrit : 42.0 %  Platelet Count - Automated : 342 K/uL  Mean Cell Volume : 83.3 fL  Mean Cell Hemoglobin : 28.0 pg  Mean Cell Hemoglobin Concentration : 33.6 %  Auto Neutrophil # : x  Auto Lymphocyte # : x  Auto Monocyte # : x  Auto Eosinophil # : x  Auto Basophil # : x  Auto Neutrophil % : x  Auto Lymphocyte % : x  Auto Monocyte % : x  Auto Eosinophil % : x  Auto Basophil % : x    07-10    139  |  97<L>  |  15  ----------------------------<  151<H>  4.0   |  27  |  0.70    Ca    9.7      10 Jul 2017 04:20            MICROBIOLOGY:          Vancomycin Level, Trough: 10.9 ug/mL (07-10-17 @ 04:20)  v            RADIOLOGY    CXR:    CT HEAD:    CT CHEST:    CT ABDOMEN:    MRI:    OTHER:

## 2017-07-10 NOTE — PROGRESS NOTE ADULT - SUBJECTIVE AND OBJECTIVE BOX
CC: F/u for sepsis due to cellulitis     SUBJECTIVE / OVERNIGHT EVENTS: Patient without complaints this AM. Feeling well. Denies any pain, fevers, or chills.     MEDICATIONS  (STANDING):  sodium chloride 0.45%. 1000 milliLiter(s) (75 mL/Hr) IV Continuous <Continuous>  insulin lispro (HumaLOG) corrective regimen sliding scale   SubCutaneous three times a day before meals  insulin lispro (HumaLOG) corrective regimen sliding scale   SubCutaneous at bedtime  dextrose 5%. 1000 milliLiter(s) (50 mL/Hr) IV Continuous <Continuous>  dextrose 50% Injectable 12.5 Gram(s) IV Push once  dextrose 50% Injectable 25 Gram(s) IV Push once  dextrose 50% Injectable 25 Gram(s) IV Push once  lactobacillus acidophilus 1 Tablet(s) Oral daily  vancomycin  IVPB 1250 milliGRAM(s) IV Intermittent every 12 hours  insulin lispro Injectable (HumaLOG) 18 Unit(s) SubCutaneous three times a day before meals  insulin glargine Injectable (LANTUS) 46 Unit(s) SubCutaneous at bedtime    MEDICATIONS  (PRN):  acetaminophen   Tablet. 650 milliGRAM(s) Oral every 6 hours PRN Mild Pain (1 - 3)  oxyCODONE    IR 5 milliGRAM(s) Oral every 4 hours PRN Moderate Pain (4 - 6)  oxyCODONE    IR 10 milliGRAM(s) Oral every 4 hours PRN Severe Pain (7 - 10)  dextrose Gel 1 Dose(s) Oral once PRN Blood Glucose LESS THAN 70 milliGRAM(s)/deciliter  glucagon  Injectable 1 milliGRAM(s) IntraMuscular once PRN Glucose LESS THAN 70 milligrams/deciliter    Vital Signs Last 24 Hrs  T(C): 36.4 (10 Jul 2017 13:15), Max: 36.8 (09 Jul 2017 21:36)  T(F): 97.5 (10 Jul 2017 13:15), Max: 98.2 (09 Jul 2017 21:36)  HR: 66 (10 Jul 2017 13:15) (66 - 85)  BP: 123/79 (10 Jul 2017 13:15) (105/60 - 123/79)  BP(mean): --  RR: 18 (10 Jul 2017 13:15) (18 - 18)  SpO2: 100% (10 Jul 2017 13:15) (99% - 100%)    PHYSICAL EXAM:  GENERAL: NAD, well-developed  HEAD:  Atraumatic, Normocephalic  EYES: EOMI, PERRLA, conjunctiva and sclera clear  NECK: Supple, No JVD  CHEST/LUNG: Clear to auscultation bilaterally; No wheeze  HEART: Regular rate and rhythm; No murmurs, rubs, or gallops  ABDOMEN: Soft, Nontender, Nondistended; Bowel sounds present  EXTREMITIES:  No clubbing, cyanosis, or edema, Right foot bandage c/d/i  PSYCH: AAOx3  NEUROLOGY: non-focal  SKIN: No rashes or lesions    LABS: PERSONALLY REVIEWED                                   14.1   8.32  )-----------( 342      ( 10 Jul 2017 04:20 )             42.0     07-10    139  |  97<L>  |  15  ----------------------------<  151<H>  4.0   |  27  |  0.70    Ca    9.7      10 Jul 2017 04:20    WOUND Cx: MRSA    Blood cultures: NTD    RADIOLOGY & ADDITIONAL TESTS: None    Imaging Personally Reviewed: None    Consultant(s) Notes Reviewed: Endocrinology    Care Discussed with Consultants/Other Providers: Podiatry- Discussed continued IV antibiotics and no plan for further intervention     Assessment and Plan:

## 2017-07-10 NOTE — PROGRESS NOTE ADULT - ASSESSMENT
27 yo M with uncontrolled DM1 (A1c=9.0), c/b diabetic retinopathy, hx gaseous gangrene of right foot presenting with sepsis 2/2 MRSA in RLE diabetic foot ulcer, possible recurrence of prior infection.

## 2017-07-10 NOTE — PROGRESS NOTE ADULT - PROBLEM SELECTOR PLAN 1
BG remain elevated. target fs 100-180mg/dl  c/w Humalog to 18/18/18  Increase Lantus to 46units qhs  c/w moderate correction scale ac/hs  DC plan- anticpate basal bolus insulin.  discontinue 70/30.  F/u at endocrine office - appt should be scheduled prior to discharge [phone].  Full dc plan to follow

## 2017-07-10 NOTE — PROGRESS NOTE ADULT - ASSESSMENT
33 y/o M w/ R foot surgical site infection  - Pt seen and examined  - Appearance improving, granulating well with wound VAC  - ED WCx shows MRSA  - Likely local sx site abscess/soft tissue infection, agree with ID  - Low suspicion for residual OM  - No sx intervention at this time  - Continue antibiotic therapy per ID  - Will continue wound VAC therapy as outpt and close f/u  - Pod stable for d/c  - D/w attending

## 2017-07-10 NOTE — PROGRESS NOTE ADULT - SUBJECTIVE AND OBJECTIVE BOX
Chief Complaint: DM1    History: tolerating po,  Ate cereal, and eggs for breakfast. does not have endocrinologist. Reports + hypoglycemia yesterday pre dinner, felt dizzy, then ate dinner and felt better.    MEDICATIONS  (STANDING):  sodium chloride 0.45%. 1000 milliLiter(s) (75 mL/Hr) IV Continuous <Continuous>  insulin lispro (HumaLOG) corrective regimen sliding scale   SubCutaneous three times a day before meals  insulin lispro (HumaLOG) corrective regimen sliding scale   SubCutaneous at bedtime  dextrose 5%. 1000 milliLiter(s) (50 mL/Hr) IV Continuous <Continuous>  dextrose 50% Injectable 12.5 Gram(s) IV Push once  dextrose 50% Injectable 25 Gram(s) IV Push once  insulin glargine Injectable (LANTUS) 46 Unit(s) SubCutaneous at bedtime  insulin lispro Injectable (HumaLOG) 18 Unit(s) SubCutaneous three times a day before meals    MEDICATIONS  (PRN):  dextrose Gel 1 Dose(s) Oral once PRN Blood Glucose LESS THAN 70 milliGRAM(s)/deciliter  glucagon  Injectable 1 milliGRAM(s) IntraMuscular once PRN Glucose LESS THAN 70 milligrams/deciliter      Allergies    No Known Allergies    Intolerances      Review of Systems:  As above and ALL OTHER SYSTEMS REVIEWED AND NEGATIVE    PHYSICAL EXAM:  Vital Signs Last 24 Hrs  T(C): 36.4 (10 Jul 2017 05:44), Max: 36.8 (09 Jul 2017 21:36)  T(F): 97.5 (10 Jul 2017 05:44), Max: 98.2 (09 Jul 2017 21:36)  HR: 70 (10 Jul 2017 05:44) (70 - 85)  BP: 109/68 (10 Jul 2017 05:44) (105/60 - 123/75)  BP(mean): --  RR: 18 (10 Jul 2017 05:44) (18 - 18)  SpO2: 99% (10 Jul 2017 05:44) (99% - 100%)  GENERAL: NAD, well-groomed, well-developed  EYES: No proptosis, no lid lag, anicteric  HEENT:  Atraumatic, Normocephalic, moist mucous membranes  SKIN: RLE with wound vac  NEURO: extraocular movements intact, no tremor, A & O x3      CAPILLARY BLOOD GLUCOSE  146 (10 Jul 2017 11:47)  194 (10 Jul 2017 08:06)  283 (09 Jul 2017 21:36)  144 (09 Jul 2017 18:29) FS repeated after dinner  78 (09 Jul 2017 16:56) Humalog held    288 (09 Jul 2017 08:08)  242 (08 Jul 2017 22:30)  129 (08 Jul 2017 16:42)  281 (08 Jul 2017 11:40)    267 (07-07 @ 12:04)  264 (07-07 @ 08:39)  273 (07-06 @ 21:57)  222 (07-06 @ 16:34)        Thyroid Function Tests:  07-06 @ 06:05 TSH 2.89 FreeT4 -- T3 -- Anti TPO -- Anti Thyroglobulin Ab -- TSI --      Hemoglobin A1C, Whole Blood: 9.0 % <H> [4.0 - 5.6] (07-06-17 @ 06:05)

## 2017-07-10 NOTE — PROGRESS NOTE ADULT - PROBLEM SELECTOR PLAN 1
- s/p amputation of right 5th toe in May 2017  - s/p suture placement at last draining site on last Friday  - c/w Vancomycin at current dose, last trough 10.9   - analgesic PRN (Oxycodone 5 mg and 10 mg PRN)  - re-culture if patient has repeat fever  - Podiatry recs appreciated, no need for further intervention   - ID recs appreciated, dc zosyn and continue on vancomycin

## 2017-07-11 VITALS
OXYGEN SATURATION: 100 % | SYSTOLIC BLOOD PRESSURE: 110 MMHG | DIASTOLIC BLOOD PRESSURE: 78 MMHG | TEMPERATURE: 98 F | RESPIRATION RATE: 18 BRPM | HEART RATE: 70 BPM

## 2017-07-11 LAB
BUN SERPL-MCNC: 15 MG/DL — SIGNIFICANT CHANGE UP (ref 7–23)
CALCIUM SERPL-MCNC: 9.5 MG/DL — SIGNIFICANT CHANGE UP (ref 8.4–10.5)
CHLORIDE SERPL-SCNC: 96 MMOL/L — LOW (ref 98–107)
CO2 SERPL-SCNC: 27 MMOL/L — SIGNIFICANT CHANGE UP (ref 22–31)
CREAT SERPL-MCNC: 0.73 MG/DL — SIGNIFICANT CHANGE UP (ref 0.5–1.3)
GLUCOSE SERPL-MCNC: 191 MG/DL — HIGH (ref 70–99)
HCT VFR BLD CALC: 40.5 % — SIGNIFICANT CHANGE UP (ref 39–50)
HGB BLD-MCNC: 13.3 G/DL — SIGNIFICANT CHANGE UP (ref 13–17)
MCHC RBC-ENTMCNC: 27.5 PG — SIGNIFICANT CHANGE UP (ref 27–34)
MCHC RBC-ENTMCNC: 32.8 % — SIGNIFICANT CHANGE UP (ref 32–36)
MCV RBC AUTO: 83.9 FL — SIGNIFICANT CHANGE UP (ref 80–100)
NRBC # FLD: 0 — SIGNIFICANT CHANGE UP
PLATELET # BLD AUTO: 339 K/UL — SIGNIFICANT CHANGE UP (ref 150–400)
PMV BLD: 9.2 FL — SIGNIFICANT CHANGE UP (ref 7–13)
POTASSIUM SERPL-MCNC: 3.8 MMOL/L — SIGNIFICANT CHANGE UP (ref 3.5–5.3)
POTASSIUM SERPL-SCNC: 3.8 MMOL/L — SIGNIFICANT CHANGE UP (ref 3.5–5.3)
RBC # BLD: 4.83 M/UL — SIGNIFICANT CHANGE UP (ref 4.2–5.8)
RBC # FLD: 12.4 % — SIGNIFICANT CHANGE UP (ref 10.3–14.5)
SODIUM SERPL-SCNC: 139 MMOL/L — SIGNIFICANT CHANGE UP (ref 135–145)
WBC # BLD: 8.42 K/UL — SIGNIFICANT CHANGE UP (ref 3.8–10.5)
WBC # FLD AUTO: 8.42 K/UL — SIGNIFICANT CHANGE UP (ref 3.8–10.5)

## 2017-07-11 PROCEDURE — 99239 HOSP IP/OBS DSCHRG MGMT >30: CPT

## 2017-07-11 PROCEDURE — 99232 SBSQ HOSP IP/OBS MODERATE 35: CPT

## 2017-07-11 RX ORDER — INSULIN LISPRO 100/ML
18 VIAL (ML) SUBCUTANEOUS
Qty: 0 | Refills: 0 | Status: DISCONTINUED | OUTPATIENT
Start: 2017-07-11 | End: 2017-07-11

## 2017-07-11 RX ORDER — ENOXAPARIN SODIUM 100 MG/ML
46 INJECTION SUBCUTANEOUS
Qty: 1 | Refills: 0
Start: 2017-07-11

## 2017-07-11 RX ORDER — INSULIN LISPRO 100/ML
16 VIAL (ML) SUBCUTANEOUS
Qty: 0 | Refills: 0 | Status: DISCONTINUED | OUTPATIENT
Start: 2017-07-11 | End: 2017-07-11

## 2017-07-11 RX ORDER — INSULIN LISPRO 100/ML
18 VIAL (ML) SUBCUTANEOUS
Qty: 1 | Refills: 0
Start: 2017-07-11

## 2017-07-11 RX ORDER — LACTOBACILLUS ACIDOPHILUS 100MM CELL
1 CAPSULE ORAL
Qty: 5 | Refills: 0 | OUTPATIENT
Start: 2017-07-11 | End: 2017-07-16

## 2017-07-11 RX ORDER — INSULIN GLARGINE 100 [IU]/ML
30 INJECTION, SOLUTION SUBCUTANEOUS
Qty: 0 | Refills: 0 | COMMUNITY

## 2017-07-11 RX ORDER — INSULIN LISPRO 100/ML
14 VIAL (ML) SUBCUTANEOUS
Qty: 0 | Refills: 0 | Status: DISCONTINUED | OUTPATIENT
Start: 2017-07-11 | End: 2017-07-11

## 2017-07-11 RX ORDER — INSULIN ASPART 100 [IU]/ML
20 INJECTION, SOLUTION SUBCUTANEOUS
Qty: 0 | Refills: 0 | COMMUNITY

## 2017-07-11 RX ADMIN — Medication 16 UNIT(S): at 17:06

## 2017-07-11 RX ADMIN — Medication 166.67 MILLIGRAM(S): at 05:27

## 2017-07-11 RX ADMIN — Medication 18 UNIT(S): at 08:51

## 2017-07-11 RX ADMIN — Medication 2: at 08:50

## 2017-07-11 RX ADMIN — Medication 166.67 MILLIGRAM(S): at 17:09

## 2017-07-11 RX ADMIN — Medication 14 UNIT(S): at 12:39

## 2017-07-11 RX ADMIN — Medication 1 TABLET(S): at 12:49

## 2017-07-11 NOTE — PROGRESS NOTE ADULT - SUBJECTIVE AND OBJECTIVE BOX
Chief Complaint: DM1    History: tolerating po,  Ate cereal, and eggs for breakfast. does not have endocrinologist. Reports + hypoglycemia symptoms yesterday pre dinner, ate very little at lunch.  Did not like food, received premeal insulin.  pre dinner insulin held, patient reported feeling like "my sugar was getting high after dinner"    MEDICATIONS  (STANDING):  sodium chloride 0.45%. 1000 milliLiter(s) (75 mL/Hr) IV Continuous <Continuous>  insulin lispro (HumaLOG) corrective regimen sliding scale   SubCutaneous three times a day before meals  insulin lispro (HumaLOG) corrective regimen sliding scale   SubCutaneous at bedtime  dextrose 5%. 1000 milliLiter(s) (50 mL/Hr) IV Continuous <Continuous>  dextrose 50% Injectable 12.5 Gram(s) IV Push once  dextrose 50% Injectable 25 Gram(s) IV Push once  insulin glargine Injectable (LANTUS) 46 Unit(s) SubCutaneous at bedtime  insulin lispro Injectable (HumaLOG) 18 Unit(s) SubCutaneous three times a day before meals    MEDICATIONS  (PRN):  dextrose Gel 1 Dose(s) Oral once PRN Blood Glucose LESS THAN 70 milliGRAM(s)/deciliter  glucagon  Injectable 1 milliGRAM(s) IntraMuscular once PRN Glucose LESS THAN 70 milligrams/deciliter      Allergies    No Known Allergies    Intolerances      Review of Systems:  As above and ALL OTHER SYSTEMS REVIEWED AND NEGATIVE    PHYSICAL EXAM:  Vital Signs Last 24 Hrs  T(C): 36.7 (11 Jul 2017 12:58), Max: 36.7 (11 Jul 2017 12:58)  T(F): 98 (11 Jul 2017 12:58), Max: 98 (11 Jul 2017 12:58)  HR: 68 (11 Jul 2017 12:58) (65 - 76)  BP: 109/73 (11 Jul 2017 12:58) (109/73 - 122/65)  BP(mean): --  RR: 18 (11 Jul 2017 05:24) (18 - 18)  SpO2: 99% (11 Jul 2017 05:24) (99% - 100%)  GENERAL: NAD, well-groomed, well-developed  EYES: No proptosis, no lid lag, anicteric  HEENT:  Atraumatic, Normocephalic, moist mucous membranes  SKIN: RLE with wound vac  NEURO: extraocular movements intact, no tremor, A & O x3    CAPILLARY BLOOD GLUCOSE  123 (11 Jul 2017 11:52)  189 (11 Jul 2017 08:22)  359 (10 Jul 2017 22:08)  128 (10 Jul 2017 18:08)  92 (10 Jul 2017 17:24)dinner - humalog held  94 (10 Jul 2017 16:50)  64 (10 Jul 2017 16:20)  146 (10 Jul 2017 11:47)  194 (10 Jul 2017 08:06)  283 (09 Jul 2017 21:36)  144 (09 Jul 2017 18:29) FS repeated after dinner  78 (09 Jul 2017 16:56) Humalog held    288 (09 Jul 2017 08:08)  242 (08 Jul 2017 22:30)  129 (08 Jul 2017 16:42)  281 (08 Jul 2017 11:40)    267 (07-07 @ 12:04)  264 (07-07 @ 08:39)  273 (07-06 @ 21:57)  222 (07-06 @ 16:34)        Thyroid Function Tests:  07-06 @ 06:05 TSH 2.89 FreeT4 -- T3 -- Anti TPO -- Anti Thyroglobulin Ab -- TSI --      Hemoglobin A1C, Whole Blood: 9.0 % <H> [4.0 - 5.6] (07-06-17 @ 06:05)

## 2017-07-11 NOTE — PROGRESS NOTE ADULT - PROBLEM SELECTOR PROBLEM 2
Type 1 diabetes mellitus with hyperglycemia Uncontrolled type 2 diabetes mellitus with hyperglycemia, with long-term current use of insulin

## 2017-07-11 NOTE — PROGRESS NOTE ADULT - PROBLEM SELECTOR PROBLEM 1
Sepsis due to cellulitis
Uncontrolled type 1 diabetes mellitus with hyperglycemia
Cellulitis of right lower extremity

## 2017-07-11 NOTE — PROGRESS NOTE ADULT - SUBJECTIVE AND OBJECTIVE BOX
Patient is a 32y old  Male who presents with a chief complaint of "My foot got reinfected." (06 Jul 2017 00:50)       5/28 s/p RF partial fifth ray resection, dehiscence    INTERVAL HPI/OVERNIGHT EVENTS:  Patient seen and evaluated at bedside.  Pt is resting comfortable in NAD. Denies N/V/F/C.    Allergies    No Known Allergies    Intolerances        Vital Signs Last 24 Hrs  T(C): 36.4 (10 Jul 2017 05:44), Max: 36.8 (09 Jul 2017 21:36)  T(F): 97.5 (10 Jul 2017 05:44), Max: 98.2 (09 Jul 2017 21:36)  HR: 70 (10 Jul 2017 05:44) (70 - 85)  BP: 109/68 (10 Jul 2017 05:44) (105/60 - 123/75)  BP(mean): --  RR: 18 (10 Jul 2017 05:44) (18 - 18)  SpO2: 99% (10 Jul 2017 05:44) (99% - 100%)                          14.1   8.32  )-----------( 342      ( 10 Jul 2017 04:20 )             42.0       07-10    139  |  97<L>  |  15  ----------------------------<  151<H>  4.0   |  27  |  0.70    Ca    9.7      10 Jul 2017 04:20      Lower Extremity Physical Exam:  Vasular: Pedal pulses palpable, B/L.   Neuro: Epicritic sensation diminished to the level of the foot, B/L.  Musculoskeletal/Ortho: S/p 5th ray amp, R foot  Skin:  R sx incision open, no drainage, no malodor, no PTB, periwound erythema and swelling significantly reduced, wound base granulating.    Wound VAC intact to Right foot, continuous at 125mmHg.

## 2017-07-11 NOTE — PROGRESS NOTE ADULT - ASSESSMENT
28 year old male with uncontrolled DM1 c/b retinopathy a/w RLE celulitis and hyperglycemia. s/p hypoglycemia yesterday     Advise Patient on Discharge note:  - Hypoglycemia Management : Please check your fingersticks every morning or if you are not feeling well.  If your fingerstick is >300 mg/dl x 3 or more readings, please contact (provider) ______________________________________. If your fingerstick low, <70 mg/dl and/or you have symptoms of very low blood sugar, FIRST drink 1/2 cup of apple juice, (or take 4 glucose tabs/tube of glucose gel) and recheck fingerstick in 15 minutes.  Repeat these steps until blood sugar is above 100 mg/dl, IF NECESSARY.  Then call provider listed above to discuss low blood sugar at (phone number)______________________________.    -What to expect at follow appointment:  Please bring a log of your fingersticks and/or your glucometer to your appointment.  Your blod [employer] tracking will help your diabetes team determine the best plan.    Insulin regimen:        Additional Discharge recs:    Diabetes supplies to be orderd via E-prescribe:    (Please order at least 24 hours prior to discharge to avoid any unforseen insurance/discharge issues.)    - Alcohol pads  - Glucometer as per insurance  - Test strips  ________x/day  - Lancets      ________x/day  - BD eduardo needles____Insulin syringe___    Discharge Appointments:  CDE appoitnment:  MD/NP appointment:  Diabetes Wellness Referral: 28 year old male with uncontrolled DM1 c/b retinopathy a/w RLE celulitis and hyperglycemia. s/p hypoglycemia yesterday     Advise Patient on Discharge note:  - Hypoglycemia Management : Please check your fingersticks every morning or if you are not feeling well.  If your fingerstick is >300 mg/dl x 3 or more readings, please contact (provider) _Dr. Rowe, or shan bermudez np___. If your fingerstick low, <70 mg/dl and/or you have symptoms of very low blood sugar, FIRST drink 1/2 cup of apple juice, (or take 4 glucose tabs/tube of glucose gel) and recheck fingerstick in 15 minutes.  Repeat these steps until blood sugar is above 100 mg/dl, IF NECESSARY.  Then call provider listed above to discuss low blood sugar at (phone number)__[phone]___.    -What to expect at follow appointment:  Please bring a log of your fingersticks and/or your glucometer to your appointment.  Your blod [employer] tracking will help your diabetes team determine the best plan.    Insulin regimen:  lantus solostar pen 46 units sq qhs  humalog kwikpen 18 units before brekafast  14 units pre lunnch  16 units pre dinner  Discontinue 70-30    Diabetes supplies to be orderd via E-prescribe:    (Please order at least 24 hours prior to discharge to avoid any unforseen insurance/discharge issues.)    - Alcohol pads  - Glucometer as per insurance  - Test strips  4__x/day  - Lancets      4_x/day  - BD eduardo needles_4x/day    Discharge Appointments:  Patient does not want to make appointment at this time.  Patient was given card for Endocrinology providers- [facility] at [phone], [phone], and [phone], [phone].  Discussed at length importance of diabetes care and high recommendation for close diabetes follow up.

## 2017-07-11 NOTE — PROGRESS NOTE ADULT - PROBLEM SELECTOR PLAN 1
Unable to determine if sepsis 2/2 to right foot cellulitis is related to previous debridement/amputation procedure performed in May 2017 although organism is the same (MRSA)  - s/p amputation of right 5th toe in May 2017  - s/p suture placement at last draining site on last Friday  - c/w Vancomycin at current dose, last trough 10.9   - analgesic PRN (Oxycodone 5 mg and 10 mg PRN)  - re-culture if patient has repeat fever  - Podiatry recs appreciated, no need for further intervention; wound vac needed on M/W/F basis to be delivered to patient's home tomorrow   - ID recs appreciated, dc zosyn and continue on vancomycin  - Ideally would like to convert to PO antibiotics today since discharge planning for tomorrow

## 2017-07-11 NOTE — PROGRESS NOTE ADULT - ASSESSMENT
31 y/o M w/ R foot surgical site infection, right foot full thickness ulceration   - Pt seen and examined  - Cont VAC therapy MWF as out pt  - ED WCx shows MRSA - abx per ID.   - Likely local sx site abscess/soft tissue infection, agree with ID  - Low suspicion for residual OM.  If no improvement as out patient will book further debridement as out patient.   - No sx intervention at this time  - Pod stable for d/c  - Follow up with Dr. Blue within 1 week of DC.  Call [phone] for apt.

## 2017-07-11 NOTE — PROGRESS NOTE ADULT - ATTENDING COMMENTS
Dispo- Pending final ID recs for oral antibiotic regiment and delivering wound vac to patient's home tomorrow     DISCHARGE TIME- 35 MINUTES SPENT PREPARING DISCHARGE, INCLUDING PAPERWORK, MEDICATION RECONCILIATION, AND COUNSELLING OF PATIENT.

## 2017-07-11 NOTE — PROGRESS NOTE ADULT - PROBLEM SELECTOR PLAN 2
-Endocrinology recommendations appreciated. Patient still with hyperglycemia  -Will f/u further endocrine recs.  -C/w Lantus 46U QHS and c/w Humalog 18/14/16 for now  - Patient has diabetic retinopathy. Outpatient ophtho f/u -Endocrinology recommendations appreciated. Patient still with hyperglycemia  -Will f/u further endocrine recs.  -C/w Lantus 46U QHS and c/w Humalog 18/14/16 for now

## 2017-07-11 NOTE — PROGRESS NOTE ADULT - PROBLEM SELECTOR PLAN 1
BG remain elevated. target fs 100-180mg/dl  Change humalog to 18 units before brekfast, 14 units before lunch and 16 units befreo dinner.  c/w Lantus to 46units qhs  c/w moderate correction scale ac/hs  DC plan- anticpate basal bolus insulin.  discontinue 70/30.  F/u at endocrine office - appt should be scheduled prior to discharge [phone].  Full dc plan to follow BG remain elevated. target fs 100-180mg/dl  Change humalog to 18 units before brekfast, 14 units before lunch and 16 units befreo dinner.  c/w Lantus to 46units qhs  c/w moderate correction scale ac/hs  DC plan above    Time spent 1 hour educating and coordianting care

## 2017-07-12 DIAGNOSIS — R69 ILLNESS, UNSPECIFIED: ICD-10-CM

## 2017-07-12 DIAGNOSIS — E11.65 TYPE 2 DIABETES MELLITUS WITH HYPERGLYCEMIA: ICD-10-CM

## 2017-07-12 PROBLEM — E11.9 TYPE 2 DIABETES MELLITUS WITHOUT COMPLICATIONS: Chronic | Status: ACTIVE | Noted: 2017-05-27

## 2017-07-12 LAB
BACTERIA BLD CULT: SIGNIFICANT CHANGE UP
BACTERIA BLD CULT: SIGNIFICANT CHANGE UP
GAD65 AB SER-MCNC: 0 NMOL/L — SIGNIFICANT CHANGE UP

## 2017-07-20 PROBLEM — Z00.00 ENCOUNTER FOR PREVENTIVE HEALTH EXAMINATION: Status: ACTIVE | Noted: 2017-07-20

## 2017-08-03 ENCOUNTER — APPOINTMENT (OUTPATIENT)
Dept: CHRONIC DISEASE MANAGEMENT | Facility: CLINIC | Age: 32
End: 2017-08-03
Payer: MEDICAID

## 2017-08-03 VITALS — HEIGHT: 67 IN | BODY MASS INDEX: 33.43 KG/M2 | WEIGHT: 213 LBS

## 2017-08-03 DIAGNOSIS — E10.9 TYPE 1 DIABETES MELLITUS W/OUT COMPLICATIONS: ICD-10-CM

## 2017-08-03 PROCEDURE — G0108 DIAB MANAGE TRN  PER INDIV: CPT

## 2017-08-08 RX ORDER — INSULIN GLARGINE 100 [IU]/ML
100 INJECTION, SOLUTION SUBCUTANEOUS AT BEDTIME
Qty: 1 | Refills: 2 | Status: ACTIVE | COMMUNITY
Start: 2017-08-03

## 2017-08-08 RX ORDER — INSULIN LISPRO 100 [IU]/ML
100 INJECTION, SOLUTION INTRAVENOUS; SUBCUTANEOUS
Qty: 1 | Refills: 2 | Status: ACTIVE | COMMUNITY
Start: 2017-08-03

## 2017-08-08 RX ORDER — PEN NEEDLE, DIABETIC 29 G X1/2"
32G X 4 MM NEEDLE, DISPOSABLE MISCELLANEOUS
Qty: 1 | Refills: 2 | Status: ACTIVE | COMMUNITY
Start: 2017-08-03

## 2017-08-17 ENCOUNTER — APPOINTMENT (OUTPATIENT)
Dept: CHRONIC DISEASE MANAGEMENT | Facility: CLINIC | Age: 32
End: 2017-08-17

## 2017-10-01 PROCEDURE — G9001: CPT

## 2017-10-02 ENCOUNTER — APPOINTMENT (OUTPATIENT)
Dept: ENDOCRINOLOGY | Facility: CLINIC | Age: 32
End: 2017-10-02

## 2017-12-08 NOTE — PROGRESS NOTE ADULT - ASSESSMENT
33 yo male with type 1 diabetes complaining of right foot pain for about one week. He states that he had a blister on the toe and he picked the blister off. It subsequently bleed and developed swelling and pain. 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 Cultures positive for MRSA and Gr B strep. son

## 2018-01-01 ENCOUNTER — OUTPATIENT (OUTPATIENT)
Dept: OUTPATIENT SERVICES | Facility: HOSPITAL | Age: 33
LOS: 1 days | End: 2018-01-01
Payer: MEDICAID

## 2018-01-01 DIAGNOSIS — Z98.890 OTHER SPECIFIED POSTPROCEDURAL STATES: Chronic | ICD-10-CM

## 2018-01-01 DIAGNOSIS — Z89.421 ACQUIRED ABSENCE OF OTHER RIGHT TOE(S): Chronic | ICD-10-CM

## 2018-01-01 DIAGNOSIS — Z90.49 ACQUIRED ABSENCE OF OTHER SPECIFIED PARTS OF DIGESTIVE TRACT: Chronic | ICD-10-CM

## 2018-01-01 PROCEDURE — G9001: CPT

## 2018-01-05 DIAGNOSIS — R69 ILLNESS, UNSPECIFIED: ICD-10-CM

## 2018-02-23 ENCOUNTER — EMERGENCY (EMERGENCY)
Facility: HOSPITAL | Age: 33
LOS: 1 days | Discharge: HOME | End: 2018-02-23
Admitting: EMERGENCY MEDICINE
Payer: MEDICAID

## 2018-02-23 VITALS
HEART RATE: 88 BPM | TEMPERATURE: 98 F | RESPIRATION RATE: 16 BRPM | OXYGEN SATURATION: 100 % | DIASTOLIC BLOOD PRESSURE: 81 MMHG | SYSTOLIC BLOOD PRESSURE: 157 MMHG

## 2018-02-23 DIAGNOSIS — Z90.49 ACQUIRED ABSENCE OF OTHER SPECIFIED PARTS OF DIGESTIVE TRACT: Chronic | ICD-10-CM

## 2018-02-23 DIAGNOSIS — Z98.890 OTHER SPECIFIED POSTPROCEDURAL STATES: Chronic | ICD-10-CM

## 2018-02-23 DIAGNOSIS — Z89.421 ACQUIRED ABSENCE OF OTHER RIGHT TOE(S): Chronic | ICD-10-CM

## 2018-02-23 PROCEDURE — 99283 EMERGENCY DEPT VISIT LOW MDM: CPT

## 2018-02-23 RX ORDER — ACETAMINOPHEN 500 MG
650 TABLET ORAL ONCE
Qty: 0 | Refills: 0 | Status: COMPLETED | OUTPATIENT
Start: 2018-02-23 | End: 2018-02-23

## 2018-02-23 RX ADMIN — Medication 650 MILLIGRAM(S): at 20:34

## 2018-02-23 NOTE — ED PROVIDER NOTE - PLAN OF CARE
Follow up with your Dentist or call the clinic to schedule an appointment [phone].    Soft diet eat on opposite side of mouth.    Take Tylenol 650mg orally as needed for pain.    Return to the ER for any persistent/worsening or new symptoms swelling, fevers, chills or any concerning symptoms.

## 2018-02-23 NOTE — ED ADULT TRIAGE NOTE - CHIEF COMPLAINT QUOTE
Pt c/o of having a tooth ache right upper back area pt states he has swelling to his face since this morning PMH DM

## 2018-02-23 NOTE — ED PROVIDER NOTE - OBJECTIVE STATEMENT
31 y/o male with a hx of DM presents to the ER c/op approximately 2-3 weeks of right sided upper dental pain worse the past 2 days.  Pt denies fevers, chills, swelling, trauma, weakness, dizziness.  Pt took Aleve this AM for pain.

## 2018-02-23 NOTE — ED PROVIDER NOTE - CARE PLAN
Principal Discharge DX:	Tooth pain  Assessment and plan of treatment:	Follow up with your Dentist or call the clinic to schedule an appointment [phone].    Soft diet eat on opposite side of mouth.    Take Tylenol 650mg orally as needed for pain.    Return to the ER for any persistent/worsening or new symptoms swelling, fevers, chills or any concerning symptoms.

## 2018-04-09 ENCOUNTER — EMERGENCY (EMERGENCY)
Facility: HOSPITAL | Age: 33
LOS: 1 days | Discharge: HOME | End: 2018-04-09
Attending: EMERGENCY MEDICINE | Admitting: EMERGENCY MEDICINE
Payer: MEDICAID

## 2018-04-09 VITALS
SYSTOLIC BLOOD PRESSURE: 121 MMHG | OXYGEN SATURATION: 100 % | TEMPERATURE: 98 F | HEART RATE: 91 BPM | RESPIRATION RATE: 18 BRPM | DIASTOLIC BLOOD PRESSURE: 66 MMHG

## 2018-04-09 DIAGNOSIS — Z98.890 OTHER SPECIFIED POSTPROCEDURAL STATES: Chronic | ICD-10-CM

## 2018-04-09 DIAGNOSIS — Z89.421 ACQUIRED ABSENCE OF OTHER RIGHT TOE(S): Chronic | ICD-10-CM

## 2018-04-09 DIAGNOSIS — Z90.49 ACQUIRED ABSENCE OF OTHER SPECIFIED PARTS OF DIGESTIVE TRACT: Chronic | ICD-10-CM

## 2018-04-09 LAB
BUN SERPL-MCNC: 9 MG/DL — SIGNIFICANT CHANGE UP (ref 7–23)
CALCIUM SERPL-MCNC: 9.6 MG/DL — SIGNIFICANT CHANGE UP (ref 8.4–10.5)
CHLORIDE SERPL-SCNC: 102 MMOL/L — SIGNIFICANT CHANGE UP (ref 98–107)
CO2 SERPL-SCNC: 28 MMOL/L — SIGNIFICANT CHANGE UP (ref 22–31)
CREAT SERPL-MCNC: 0.68 MG/DL — SIGNIFICANT CHANGE UP (ref 0.5–1.3)
GLUCOSE SERPL-MCNC: 103 MG/DL — HIGH (ref 70–99)
HCT VFR BLD CALC: 45.5 % — SIGNIFICANT CHANGE UP (ref 39–50)
HGB BLD-MCNC: 14.8 G/DL — SIGNIFICANT CHANGE UP (ref 13–17)
MCHC RBC-ENTMCNC: 28.4 PG — SIGNIFICANT CHANGE UP (ref 27–34)
MCHC RBC-ENTMCNC: 32.5 % — SIGNIFICANT CHANGE UP (ref 32–36)
MCV RBC AUTO: 87.2 FL — SIGNIFICANT CHANGE UP (ref 80–100)
NRBC # FLD: 0 — SIGNIFICANT CHANGE UP
PLATELET # BLD AUTO: 317 K/UL — SIGNIFICANT CHANGE UP (ref 150–400)
PMV BLD: 10.2 FL — SIGNIFICANT CHANGE UP (ref 7–13)
POTASSIUM SERPL-MCNC: 4.4 MMOL/L — SIGNIFICANT CHANGE UP (ref 3.5–5.3)
POTASSIUM SERPL-SCNC: 4.4 MMOL/L — SIGNIFICANT CHANGE UP (ref 3.5–5.3)
RBC # BLD: 5.22 M/UL — SIGNIFICANT CHANGE UP (ref 4.2–5.8)
RBC # FLD: 13 % — SIGNIFICANT CHANGE UP (ref 10.3–14.5)
SODIUM SERPL-SCNC: 142 MMOL/L — SIGNIFICANT CHANGE UP (ref 135–145)
WBC # BLD: 9.81 K/UL — SIGNIFICANT CHANGE UP (ref 3.8–10.5)
WBC # FLD AUTO: 9.81 K/UL — SIGNIFICANT CHANGE UP (ref 3.8–10.5)

## 2018-04-09 PROCEDURE — 99284 EMERGENCY DEPT VISIT MOD MDM: CPT

## 2018-04-09 PROCEDURE — 73620 X-RAY EXAM OF FOOT: CPT | Mod: 26,RT

## 2018-04-09 NOTE — ED PROVIDER NOTE - CARE PLAN
Principal Discharge DX:	Foot pain, right Principal Discharge DX:	Foot pain, right  Goal:	- r/o infection Principal Discharge DX:	Foot pain, right  Goal:	- r/o infection  Assessment and plan of treatment:	- Foot XR showing no signs of infection.   - CBC WNL.   - Plan to discharge w/ outpt podiatry f/u

## 2018-04-09 NOTE — ED PROVIDER NOTE - MEDICAL DECISION MAKING DETAILS
33 year old M with a PMH of uncontrolled T1DM w/ diabetic retinopathy and amputation fo the right 5th toe 2/2 a gangrene infection (required wound vac) that is presenting w/ 2 days of right foot pain and swelling. On physical exam there is no signs of infection. CBC shows 33 year old M with a PMH of uncontrolled T1DM w/ diabetic retinopathy and amputation fo the right 5th toe 2/2 a gangrene infection (required wound vac) that is presenting w/ 2 days of right foot pain and swelling. On physical exam there is no signs of infection. CBC shows no WBC. Xray of the foot shows no signs of osteo or infection. BMP was unremarkable. 33 year old M with a PMH of uncontrolled T1DM w/ diabetic retinopathy and amputation fo the right 5th toe 2/2 a gangrene infection (required wound vac) that is presenting w/ 2 days of right foot pain and swelling. On physical exam there is no signs of infection. CBC shows no WBC. Xray of the foot shows no signs of osteo or infection. BMP was unremarkable. Pt has follow up apt with his podiatrist tomorrow. At this time pt is stable for discharge.

## 2018-04-09 NOTE — ED PROVIDER NOTE - OBJECTIVE STATEMENT
Pt is a 33 year old M with a PMH of uncontrolled T1DM w/ diabetic retinopathy and amputation fo the right 5th toe 2/2 a gangrene infection (required wound vac) that is presenting w/ 2 days of right foot pain and swelling. He states that 3 days ago he worked a long shift and was on his feet for most of the day, and when he returned home he noted that his right foot was swollen. Usually if he has swelling of his legs it goes away when he raises his feet, but he felt like the swelling did not get better. The next day he states that he started to have pains in his foot. He describes it as shooting pain that is intermittent. He last saw the podiatrist in August, and everything was normal. He has been doing well and was supposed to follow up with the podiatrist tomorrow.  He denies fevers, discharge, open cuts or wounds, or trauma.

## 2018-04-09 NOTE — ED PROVIDER NOTE - ATTENDING CONTRIBUTION TO CARE
DR. JACKMAN, ATTENDING MD-  I performed a face to face bedside interview with patient regarding history of present illness, review of symptoms and past medical history. I completed an independent physical exam.  I have discussed patient's plan of care with the resident.   Documentation as above in the note.    Demarcus: c/o pain/swelling to rt foot at site of past toe amputation, as described above.  No F/C.  No discharge from site.  On my exam patient well appearing and comfortable, rt foot: no erythema/warmth, surgical site well healed, no discharge from site.  2+DP pulse rt foot.  a/p: rt foot pain.  no systemic signs of infection by history or exam, but will check cbc and will check xray.  patient already has appt with his podiatrist tomorrow.

## 2018-04-09 NOTE — ED PROVIDER NOTE - SKIN, MLM
5th tow amputation on the right foot. Right foot has no edema or erythema. There is a closed clean wound with no drainage.

## 2018-04-09 NOTE — ED PROVIDER NOTE - PROGRESS NOTE DETAILS
- Pt with no obvious signs of infection on physical exam. No fever. No signs of sepsis. Will check cbc for elevated WBC. will also check right foot Xray.

## 2018-04-09 NOTE — ED ADULT NURSE NOTE - CHIEF COMPLAINT QUOTE
pt with Hx. right foot 5th toe Sx. in May coming with and increase pain/swelling since Sat.    Denies fever/no erythema to area,    JP=[id number]  Hx.

## 2018-04-09 NOTE — ED PROVIDER NOTE - PSH
H/O amputation of lesser toe, right  ~ 5th  H/O eye surgery  ~ left, due to Retinopathy  History of appendectomy

## 2018-04-09 NOTE — ED ADULT NURSE NOTE - OBJECTIVE STATEMENT
Pt. received in room 13. Pt. is A&O x3 and ambulatory, hx of diabetes and fifth digit of right foot amputation c/o two days of right foot pain and swelling. Pt. reports working a long shift 3 days ago and when he returned home, his foot was swollen and has not gotten better. Pt. states pain is intermittent and is going to follow up with his podiatrist tomorrow. Pt. denies fever, chills, discharge, open cuts, trauma, abdominal pain, n/v/d, chest pain, SOB. Respirations are even and unlabored on room air. Will continue to monitor.

## 2018-04-09 NOTE — ED PROVIDER NOTE - PLAN OF CARE
- r/o infection - Foot XR showing no signs of infection.   - CBC WNL.   - Plan to discharge w/ outpt podiatry f/u

## 2018-07-09 PROBLEM — E10.9 DM TYPE 1 (DIABETES MELLITUS, TYPE 1): Status: ACTIVE | Noted: 2018-07-09

## 2019-09-21 ENCOUNTER — EMERGENCY (EMERGENCY)
Facility: HOSPITAL | Age: 34
LOS: 1 days | Discharge: HOME | End: 2019-09-21
Attending: EMERGENCY MEDICINE | Admitting: EMERGENCY MEDICINE
Payer: MEDICAID

## 2019-09-21 VITALS
OXYGEN SATURATION: 100 % | RESPIRATION RATE: 18 BRPM | HEART RATE: 111 BPM | DIASTOLIC BLOOD PRESSURE: 93 MMHG | TEMPERATURE: 98 F | SYSTOLIC BLOOD PRESSURE: 146 MMHG

## 2019-09-21 DIAGNOSIS — Z90.49 ACQUIRED ABSENCE OF OTHER SPECIFIED PARTS OF DIGESTIVE TRACT: Chronic | ICD-10-CM

## 2019-09-21 DIAGNOSIS — Z98.890 OTHER SPECIFIED POSTPROCEDURAL STATES: Chronic | ICD-10-CM

## 2019-09-21 DIAGNOSIS — Z89.421 ACQUIRED ABSENCE OF OTHER RIGHT TOE(S): Chronic | ICD-10-CM

## 2019-09-21 PROBLEM — E11.319 TYPE 2 DIABETES MELLITUS WITH UNSPECIFIED DIABETIC RETINOPATHY WITHOUT MACULAR EDEMA: Chronic | Status: ACTIVE | Noted: 2017-05-28

## 2019-09-21 LAB
ALBUMIN SERPL ELPH-MCNC: 4.2 G/DL — SIGNIFICANT CHANGE UP (ref 3.3–5)
ALP SERPL-CCNC: 91 U/L — SIGNIFICANT CHANGE UP (ref 40–120)
ALT FLD-CCNC: 35 U/L — SIGNIFICANT CHANGE UP (ref 4–41)
ANION GAP SERPL CALC-SCNC: 12 MMO/L — SIGNIFICANT CHANGE UP (ref 7–14)
APTT BLD: 31 SEC — SIGNIFICANT CHANGE UP (ref 27.5–36.3)
AST SERPL-CCNC: 23 U/L — SIGNIFICANT CHANGE UP (ref 4–40)
BASE EXCESS BLDV CALC-SCNC: 4.7 MMOL/L — SIGNIFICANT CHANGE UP
BASOPHILS # BLD AUTO: 0.05 K/UL — SIGNIFICANT CHANGE UP (ref 0–0.2)
BASOPHILS NFR BLD AUTO: 0.4 % — SIGNIFICANT CHANGE UP (ref 0–2)
BILIRUB SERPL-MCNC: 0.5 MG/DL — SIGNIFICANT CHANGE UP (ref 0.2–1.2)
BLD GP AB SCN SERPL QL: NEGATIVE — SIGNIFICANT CHANGE UP
BLOOD GAS VENOUS - CREATININE: SIGNIFICANT CHANGE UP MG/DL (ref 0.5–1.3)
BUN SERPL-MCNC: 15 MG/DL — SIGNIFICANT CHANGE UP (ref 7–23)
CALCIUM SERPL-MCNC: 9.4 MG/DL — SIGNIFICANT CHANGE UP (ref 8.4–10.5)
CHLORIDE BLDV-SCNC: 106 MMOL/L — SIGNIFICANT CHANGE UP (ref 96–108)
CHLORIDE SERPL-SCNC: 99 MMOL/L — SIGNIFICANT CHANGE UP (ref 98–107)
CO2 SERPL-SCNC: 25 MMOL/L — SIGNIFICANT CHANGE UP (ref 22–31)
CREAT SERPL-MCNC: 0.64 MG/DL — SIGNIFICANT CHANGE UP (ref 0.5–1.3)
CRP SERPL-MCNC: 21.3 MG/L — HIGH
EOSINOPHIL # BLD AUTO: 0.13 K/UL — SIGNIFICANT CHANGE UP (ref 0–0.5)
EOSINOPHIL NFR BLD AUTO: 1.2 % — SIGNIFICANT CHANGE UP (ref 0–6)
GAS PNL BLDV: 135 MMOL/L — LOW (ref 136–146)
GLUCOSE BLDV-MCNC: 234 MG/DL — HIGH (ref 70–99)
GLUCOSE SERPL-MCNC: 231 MG/DL — HIGH (ref 70–99)
HCO3 BLDV-SCNC: 27 MMOL/L — SIGNIFICANT CHANGE UP (ref 20–27)
HCT VFR BLD CALC: 46.8 % — SIGNIFICANT CHANGE UP (ref 39–50)
HCT VFR BLDV CALC: 48.4 % — SIGNIFICANT CHANGE UP (ref 39–51)
HGB BLD-MCNC: 15.3 G/DL — SIGNIFICANT CHANGE UP (ref 13–17)
HGB BLDV-MCNC: 15.8 G/DL — SIGNIFICANT CHANGE UP (ref 13–17)
IMM GRANULOCYTES NFR BLD AUTO: 0.2 % — SIGNIFICANT CHANGE UP (ref 0–1.5)
INR BLD: 1.03 — SIGNIFICANT CHANGE UP (ref 0.88–1.17)
LACTATE BLDV-MCNC: 1.4 MMOL/L — SIGNIFICANT CHANGE UP (ref 0.5–2)
LYMPHOCYTES # BLD AUTO: 1.86 K/UL — SIGNIFICANT CHANGE UP (ref 1–3.3)
LYMPHOCYTES # BLD AUTO: 16.6 % — SIGNIFICANT CHANGE UP (ref 13–44)
MAGNESIUM SERPL-MCNC: 1.9 MG/DL — SIGNIFICANT CHANGE UP (ref 1.6–2.6)
MCHC RBC-ENTMCNC: 27.1 PG — SIGNIFICANT CHANGE UP (ref 27–34)
MCHC RBC-ENTMCNC: 32.7 % — SIGNIFICANT CHANGE UP (ref 32–36)
MCV RBC AUTO: 83 FL — SIGNIFICANT CHANGE UP (ref 80–100)
MONOCYTES # BLD AUTO: 0.83 K/UL — SIGNIFICANT CHANGE UP (ref 0–0.9)
MONOCYTES NFR BLD AUTO: 7.4 % — SIGNIFICANT CHANGE UP (ref 2–14)
NEUTROPHILS # BLD AUTO: 8.3 K/UL — HIGH (ref 1.8–7.4)
NEUTROPHILS NFR BLD AUTO: 74.2 % — SIGNIFICANT CHANGE UP (ref 43–77)
NRBC # FLD: 0 K/UL — SIGNIFICANT CHANGE UP (ref 0–0)
PCO2 BLDV: 47 MMHG — SIGNIFICANT CHANGE UP (ref 41–51)
PH BLDV: 7.41 PH — SIGNIFICANT CHANGE UP (ref 7.32–7.43)
PHOSPHATE SERPL-MCNC: 2.1 MG/DL — LOW (ref 2.5–4.5)
PLATELET # BLD AUTO: 329 K/UL — SIGNIFICANT CHANGE UP (ref 150–400)
PMV BLD: 10 FL — SIGNIFICANT CHANGE UP (ref 7–13)
PO2 BLDV: 34 MMHG — LOW (ref 35–40)
POTASSIUM BLDV-SCNC: 4.1 MMOL/L — SIGNIFICANT CHANGE UP (ref 3.4–4.5)
POTASSIUM SERPL-MCNC: 4.3 MMOL/L — SIGNIFICANT CHANGE UP (ref 3.5–5.3)
POTASSIUM SERPL-SCNC: 4.3 MMOL/L — SIGNIFICANT CHANGE UP (ref 3.5–5.3)
PROT SERPL-MCNC: 8 G/DL — SIGNIFICANT CHANGE UP (ref 6–8.3)
PROTHROM AB SERPL-ACNC: 11.4 SEC — SIGNIFICANT CHANGE UP (ref 9.8–13.1)
RBC # BLD: 5.64 M/UL — SIGNIFICANT CHANGE UP (ref 4.2–5.8)
RBC # FLD: 13.4 % — SIGNIFICANT CHANGE UP (ref 10.3–14.5)
RH IG SCN BLD-IMP: POSITIVE — SIGNIFICANT CHANGE UP
SAO2 % BLDV: 70.4 % — SIGNIFICANT CHANGE UP (ref 60–85)
SODIUM SERPL-SCNC: 136 MMOL/L — SIGNIFICANT CHANGE UP (ref 135–145)
WBC # BLD: 11.19 K/UL — HIGH (ref 3.8–10.5)
WBC # FLD AUTO: 11.19 K/UL — HIGH (ref 3.8–10.5)

## 2019-09-21 PROCEDURE — 99285 EMERGENCY DEPT VISIT HI MDM: CPT

## 2019-09-21 PROCEDURE — 73660 X-RAY EXAM OF TOE(S): CPT | Mod: 26,LT

## 2019-09-21 RX ORDER — PIPERACILLIN AND TAZOBACTAM 4; .5 G/20ML; G/20ML
3.38 INJECTION, POWDER, LYOPHILIZED, FOR SOLUTION INTRAVENOUS ONCE
Refills: 0 | Status: COMPLETED | OUTPATIENT
Start: 2019-09-21 | End: 2019-09-22

## 2019-09-21 RX ORDER — PIPERACILLIN AND TAZOBACTAM 4; .5 G/20ML; G/20ML
3.38 INJECTION, POWDER, LYOPHILIZED, FOR SOLUTION INTRAVENOUS ONCE
Refills: 0 | Status: COMPLETED | OUTPATIENT
Start: 2019-09-21 | End: 2019-09-21

## 2019-09-21 RX ORDER — VANCOMYCIN HCL 1 G
1000 VIAL (EA) INTRAVENOUS ONCE
Refills: 0 | Status: COMPLETED | OUTPATIENT
Start: 2019-09-21 | End: 2019-09-21

## 2019-09-21 RX ORDER — MORPHINE SULFATE 50 MG/1
4 CAPSULE, EXTENDED RELEASE ORAL ONCE
Refills: 0 | Status: DISCONTINUED | OUTPATIENT
Start: 2019-09-21 | End: 2019-09-21

## 2019-09-21 RX ORDER — SODIUM CHLORIDE 9 MG/ML
1000 INJECTION INTRAMUSCULAR; INTRAVENOUS; SUBCUTANEOUS ONCE
Refills: 0 | Status: COMPLETED | OUTPATIENT
Start: 2019-09-21 | End: 2019-09-21

## 2019-09-21 RX ADMIN — Medication 250 MILLIGRAM(S): at 18:07

## 2019-09-21 RX ADMIN — SODIUM CHLORIDE 1000 MILLILITER(S): 9 INJECTION INTRAMUSCULAR; INTRAVENOUS; SUBCUTANEOUS at 17:04

## 2019-09-21 NOTE — ED PROVIDER NOTE - OBJECTIVE STATEMENT
35 y/o M PMHx type I poorly controlled diabetic, retinopathy, had right 5th toe amputation s/s gangrene/MRSA in 2017, presents for L foot pain x3 days. Noticed an open wound on his 5th left toe today and presented to ED for evaluation. Pt denies fever, chills, n/v, abd pain, purulent drainage, known h/o trauma/injury, bleeding, CP, SOB.

## 2019-09-21 NOTE — ED PROVIDER NOTE - CLINICAL SUMMARY MEDICAL DECISION MAKING FREE TEXT BOX
35 y/o M PMHx uncontrolled type I DM, retinopathy, 5th right toe amputation 2 years ago for gangrene/mrsa, presents for Left foot pain x3 days. Today, noticed open wound on left foot w/o purulent drainage. No known trauma/injury to region. VS stable except for tachycardia. Afebrile. Will send cbc, cmp, coags, type and screen, esr, crp, xray, ivf, and vanco. Will consult podiatry.

## 2019-09-21 NOTE — ED PROVIDER NOTE - PHYSICAL EXAMINATION
PHYSICAL EXAM:  GENERAL: non-toxic appearing; in no respiratory distress  HEAD: Atraumatic, Normocephalic;  EYES: PERRL, EOMs intact b/l w/out deficits  ENMT: Moist membranes, no anterior/posterior, or supraclavicular LAD  CHEST/LUNG: CTAB no wheezes/rhonchi/rales  HEART: tachycardic; regular rate; no murmur/gallops/rubs  ABDOMEN: +BS, soft, NT, ND  EXTREMITIES: No LE edema, +2 radial pulses b/l  MUSCULOSKELETAL: FROM of left foot and toes; area of tenderness on dorsal aspect of left foot along the 4th/5th metatarsal without crepitance, erythema, swelling;   NERVOUS SYSTEM:  A&Ox3, No motor deficits or sensory deficits; CNII-XII intact; no focal neurologic deficits  Heme/LYMPH: No ecchymosis or bruising or LAD  SKIN:  No new rashes; plantar aspect of 5th toe in the web space, there is an open 1cm wound w/o evidence of purulent drainage.

## 2019-09-21 NOTE — ED PROVIDER NOTE - PROGRESS NOTE DETAILS
discussed case podiatry resident. will come see pt pt reassessed and reevaluated. pt reports improvement of pain. explaiend results of xray and blood work to pt. currently awaiting podiatry evaluation paged podiatry resident pt reassessed and reevaluated. pt reports improvement of pain. explained results of xray and blood work to pt. currently awaiting podiatry evaluation Catalino Shah PGY1 pt reassessed and reevaluated. pt pain improved. awaiting podiatry evaluation. paged podiatry resident; awaiting call back

## 2019-09-21 NOTE — ED ADULT NURSE NOTE - NSIMPLEMENTINTERV_GEN_ALL_ED
Implemented All Universal Safety Interventions:  [place] to call system. Call bell, personal items and telephone within reach. Instruct patient to call for assistance. Room bathroom lighting operational. Non-slip footwear when patient is off stretcher. Physically safe environment: no spills, clutter or unnecessary equipment. Stretcher in lowest position, wheels locked, appropriate side rails in place.

## 2019-09-21 NOTE — ED PROVIDER NOTE - NS ED ROS FT
Constitutional: no fevers or chills  HEENT: no visual changes, no sore throat, no rhinorrhea  CV: no cp or palpitations  Resp: no sob or cough  GI: no abd pain, no nausea, no vomiting, no diarrhea, no constipation  : no dysuria, no hematuria  MSK: left foot pain;  skin: no rashes  neuro: no HA, no confusion; no numbness; no weakness, no tingling  psych: no SI/HI  heme: no LAD

## 2019-09-21 NOTE — ED ADULT NURSE NOTE - OBJECTIVE STATEMENT
Pt presents to intake, A&Ox4, ambulatory w/o assistance, pmhx of type 1 DM, toe amputations on right foot, here for evaluation of diabetic foot ulcer that he noticed today, pt states he's been having pain in foot for 2 days but did not see the ulcers until today. Pt states the toe amputations to right foot occurred due to a diabetic ulcer. Denies chest pain, shortness of breath, palpitations, diaphoresis, headaches, fevers, dizziness, nausea, vomiting, diarrhea, or urinary symptoms at this time. IV established in left AC with a 18G, labs drawn and sent, call bell in reach, warm blanket provided, bed in lowest position, side rails up x2, MD evaluation in progress. Will continue to monitor.   Plan: labs IVF, CT, reassess.

## 2019-09-22 VITALS
DIASTOLIC BLOOD PRESSURE: 72 MMHG | SYSTOLIC BLOOD PRESSURE: 118 MMHG | RESPIRATION RATE: 16 BRPM | TEMPERATURE: 99 F | HEART RATE: 103 BPM | OXYGEN SATURATION: 98 %

## 2019-09-22 LAB
ALBUMIN SERPL ELPH-MCNC: 3.6 G/DL — SIGNIFICANT CHANGE UP (ref 3.3–5)
ALP SERPL-CCNC: 81 U/L — SIGNIFICANT CHANGE UP (ref 40–120)
ALT FLD-CCNC: 29 U/L — SIGNIFICANT CHANGE UP (ref 4–41)
ANION GAP SERPL CALC-SCNC: 12 MMO/L — SIGNIFICANT CHANGE UP (ref 7–14)
AST SERPL-CCNC: 23 U/L — SIGNIFICANT CHANGE UP (ref 4–40)
BILIRUB SERPL-MCNC: 0.5 MG/DL — SIGNIFICANT CHANGE UP (ref 0.2–1.2)
BUN SERPL-MCNC: 11 MG/DL — SIGNIFICANT CHANGE UP (ref 7–23)
CALCIUM SERPL-MCNC: 8.9 MG/DL — SIGNIFICANT CHANGE UP (ref 8.4–10.5)
CHLORIDE SERPL-SCNC: 101 MMOL/L — SIGNIFICANT CHANGE UP (ref 98–107)
CO2 SERPL-SCNC: 24 MMOL/L — SIGNIFICANT CHANGE UP (ref 22–31)
CREAT SERPL-MCNC: 0.63 MG/DL — SIGNIFICANT CHANGE UP (ref 0.5–1.3)
GLUCOSE SERPL-MCNC: 186 MG/DL — HIGH (ref 70–99)
HCT VFR BLD CALC: 43.1 % — SIGNIFICANT CHANGE UP (ref 39–50)
HGB BLD-MCNC: 13.9 G/DL — SIGNIFICANT CHANGE UP (ref 13–17)
MCHC RBC-ENTMCNC: 26.8 PG — LOW (ref 27–34)
MCHC RBC-ENTMCNC: 32.3 % — SIGNIFICANT CHANGE UP (ref 32–36)
MCV RBC AUTO: 83.2 FL — SIGNIFICANT CHANGE UP (ref 80–100)
NRBC # FLD: 0 K/UL — SIGNIFICANT CHANGE UP (ref 0–0)
PLATELET # BLD AUTO: 277 K/UL — SIGNIFICANT CHANGE UP (ref 150–400)
PMV BLD: 9.7 FL — SIGNIFICANT CHANGE UP (ref 7–13)
POTASSIUM SERPL-MCNC: 3.9 MMOL/L — SIGNIFICANT CHANGE UP (ref 3.5–5.3)
POTASSIUM SERPL-SCNC: 3.9 MMOL/L — SIGNIFICANT CHANGE UP (ref 3.5–5.3)
PROT SERPL-MCNC: 7.1 G/DL — SIGNIFICANT CHANGE UP (ref 6–8.3)
RBC # BLD: 5.18 M/UL — SIGNIFICANT CHANGE UP (ref 4.2–5.8)
RBC # FLD: 13.4 % — SIGNIFICANT CHANGE UP (ref 10.3–14.5)
SODIUM SERPL-SCNC: 137 MMOL/L — SIGNIFICANT CHANGE UP (ref 135–145)
SPECIMEN SOURCE: SIGNIFICANT CHANGE UP
SPECIMEN SOURCE: SIGNIFICANT CHANGE UP
WBC # BLD: 10 K/UL — SIGNIFICANT CHANGE UP (ref 3.8–10.5)
WBC # FLD AUTO: 10 K/UL — SIGNIFICANT CHANGE UP (ref 3.8–10.5)

## 2019-09-22 PROCEDURE — 99236 HOSP IP/OBS SAME DATE HI 85: CPT

## 2019-09-22 RX ORDER — DEXTROSE 50 % IN WATER 50 %
15 SYRINGE (ML) INTRAVENOUS ONCE
Refills: 0 | Status: DISCONTINUED | OUTPATIENT
Start: 2019-09-22 | End: 2019-10-05

## 2019-09-22 RX ORDER — DEXTROSE 50 % IN WATER 50 %
25 SYRINGE (ML) INTRAVENOUS ONCE
Refills: 0 | Status: DISCONTINUED | OUTPATIENT
Start: 2019-09-22 | End: 2019-10-05

## 2019-09-22 RX ORDER — INSULIN LISPRO 100/ML
24 VIAL (ML) SUBCUTANEOUS
Refills: 0 | Status: DISCONTINUED | OUTPATIENT
Start: 2019-09-22 | End: 2019-10-05

## 2019-09-22 RX ORDER — PIPERACILLIN AND TAZOBACTAM 4; .5 G/20ML; G/20ML
3.38 INJECTION, POWDER, LYOPHILIZED, FOR SOLUTION INTRAVENOUS EVERY 8 HOURS
Refills: 0 | Status: DISCONTINUED | OUTPATIENT
Start: 2019-09-22 | End: 2019-10-05

## 2019-09-22 RX ORDER — DEXTROSE 50 % IN WATER 50 %
12.5 SYRINGE (ML) INTRAVENOUS ONCE
Refills: 0 | Status: DISCONTINUED | OUTPATIENT
Start: 2019-09-22 | End: 2019-10-05

## 2019-09-22 RX ORDER — SODIUM CHLORIDE 9 MG/ML
1000 INJECTION, SOLUTION INTRAVENOUS
Refills: 0 | Status: DISCONTINUED | OUTPATIENT
Start: 2019-09-22 | End: 2019-10-05

## 2019-09-22 RX ORDER — ACETAMINOPHEN 500 MG
650 TABLET ORAL ONCE
Refills: 0 | Status: COMPLETED | OUTPATIENT
Start: 2019-09-22 | End: 2019-09-22

## 2019-09-22 RX ORDER — GLUCAGON INJECTION, SOLUTION 0.5 MG/.1ML
1 INJECTION, SOLUTION SUBCUTANEOUS ONCE
Refills: 0 | Status: DISCONTINUED | OUTPATIENT
Start: 2019-09-22 | End: 2019-10-05

## 2019-09-22 RX ORDER — INSULIN GLARGINE 100 [IU]/ML
40 INJECTION, SOLUTION SUBCUTANEOUS AT BEDTIME
Refills: 0 | Status: DISCONTINUED | OUTPATIENT
Start: 2019-09-22 | End: 2019-10-05

## 2019-09-22 RX ADMIN — PIPERACILLIN AND TAZOBACTAM 25 GRAM(S): 4; .5 INJECTION, POWDER, LYOPHILIZED, FOR SOLUTION INTRAVENOUS at 07:57

## 2019-09-22 RX ADMIN — PIPERACILLIN AND TAZOBACTAM 200 GRAM(S): 4; .5 INJECTION, POWDER, LYOPHILIZED, FOR SOLUTION INTRAVENOUS at 00:45

## 2019-09-22 RX ADMIN — Medication 100 MILLIGRAM(S): at 00:18

## 2019-09-22 RX ADMIN — Medication 600 MILLIGRAM(S): at 00:40

## 2019-09-22 RX ADMIN — Medication 100 MILLIGRAM(S): at 06:41

## 2019-09-22 RX ADMIN — Medication 650 MILLIGRAM(S): at 02:49

## 2019-09-22 RX ADMIN — Medication 24 UNIT(S): at 09:26

## 2019-09-22 RX ADMIN — PIPERACILLIN AND TAZOBACTAM 3.38 GRAM(S): 4; .5 INJECTION, POWDER, LYOPHILIZED, FOR SOLUTION INTRAVENOUS at 01:19

## 2019-09-22 NOTE — PROGRESS NOTE ADULT - ASSESSMENT
35 yo M pt w/ Left foot plantar 5th digit wound  - pt seen and evaluated  - CRP 21.3 WBC down to 10 from 11.19, no leukocytosis  -Left foot wound located under left fifth digit measuring 2 mm x 2 mm and probes down to subQ, wound bed is dry w/ coagulated scab w/ no discharge/purulence/malodor, does not track, does not undermine, localized erythematous fifth digit, improving mild edema noted on left foot   - due to pt's prior amputation history, rec admit to CDU for IV abx of both clinda and zosyn  - c/w IV ABx  - No acute pod surg intvn  - Awaiting attending's decision for d/c status and recs   - discussed w/ attending 33 yo M pt w/ Left foot plantar 5th digit wound  - pt seen and evaluated  - CRP 21.3 WBC down to 10 from 11.19, no leukocytosis  -Left foot wound located under left fifth digit measuring 2 mm x 2 mm and probes down to subQ, wound bed is dry w/ coagulated scab w/ no discharge/purulence/malodor, does not track, does not undermine, localized erythematous fifth digit, improving mild edema noted on left foot   - due to pt's prior amputation history, rec admit to CDU for IV abx of both clinda and zosyn  - No acute pod surg intvn  - Pod stable for discharge  - f/u with Dr. Tg Blue as outpatient in office within the next week and please mention being seen in ED/CDU  - Dr. Tg Blue [place] [phone] or Jass Ferreira [phone]  - discussed w/ attending 35 yo M pt w/ Left foot plantar 5th digit wound  - pt seen and evaluated  - CRP 21.3 WBC down to 10 from 11.19, no leukocytosis  -Left foot wound located under left fifth digit measuring 2 mm x 2 mm and probes down to subQ, wound bed is dry w/ coagulated scab w/ no discharge/purulence/malodor, does not track, does not undermine, localized erythematous fifth digit, improving mild edema noted on left foot   - due to pt's prior amputation history, rec admit to CDU for IV abx of both clinda and zosyn  - No acute pod surg intvn  - Pod stable for discharge. Rec d/c on PO Clinda for 7 days  - f/u with Dr. Tg Blue as outpatient in office within the next week and please mention being seen in ED/CDU  - Dr. Tg Parsons [phone] or Jass Ferreira [phone]  - discussed w/ attending

## 2019-09-22 NOTE — CONSULT NOTE ADULT - ASSESSMENT
35 yo M pt w/ Left foot plantar 5th digit wound  - pt seen and evaluated  - CRP 21.3 WBC 11.19  -Left foot wound located under left fifth digit measuring 2 mm x 2 mm and probes down to subQ, wound bed is fibrous w/ no discharge/purulence/malodor, does not track, does not undermine, erythematous fifth digit, mild edema noted on left foot   - due to pt's prior amputation history, rec admit to CDU for IV abx of both clinda and zosyn  - will monitor appearance tomorrow and decide if admission is necessary   - ordered MRI to r/o early OM  - discussed w/ attending 33 yo M pt w/ Left foot plantar 5th digit wound  - pt seen and evaluated  - CRP 21.3 WBC 11.19  -Left foot wound located under left fifth digit measuring 2 mm x 2 mm and probes down to subQ, wound bed is fibrous w/ no discharge/purulence/malodor, does not track, does not undermine, erythematous fifth digit, mild edema noted on left foot   - due to pt's prior amputation history, rec admit to CDU for IV abx of both clinda and zosyn  - will monitor appearance tomorrow and decide if admission is necessary   - discussed w/ attending

## 2019-09-22 NOTE — ED CDU PROVIDER DISPOSITION NOTE - ATTENDING CONTRIBUTION TO CARE
Patient sent to CDU for IV abx and podiatry evaluation. patient improving, cleared by podiatry. d/c home with PO abx and outpatient f/u

## 2019-09-22 NOTE — ED CDU PROVIDER DISPOSITION NOTE - NSFOLLOWUPINSTRUCTIONS_ED_ALL_ED_FT
Please Follow Up with Dr. Tg Parsons [phone] or Jass Ferreira [phone] or your own podiatrist in 1 week.    Please take antibiotics as prescribed.

## 2019-09-22 NOTE — ED CDU PROVIDER INITIAL DAY NOTE - OBJECTIVE STATEMENT
35 y/o male with a hx of Type 1 DM, HLD presents to the ER c/o 3 days of left 5th toe pain.  Pt noticed an open wound prompting his visit to the ER.  Pt is s/p amputation of right 5th toe in 2017.  Pt denies fevers, chills, nausea, vomiting, abdominal pain, discharge, trauma.

## 2019-09-22 NOTE — CONSULT NOTE ADULT - SUBJECTIVE AND OBJECTIVE BOX
Podiatry pager #: [phone] ([place])/ [phone] ([facility])    Patient is a 34y old  Male who presents with a chief complaint of     HPI:      PAST MEDICAL & SURGICAL HISTORY:  Diabetic retinopathy  Diabetes: ~ diagnosed at 7 years of age  History of appendectomy  H/O eye surgery: ~ left, due to Retinopathy  H/O amputation of lesser toe, right: ~ 5th      MEDICATIONS  (STANDING):  clindamycin IVPB      clindamycin IVPB 600 milliGRAM(s) IV Intermittent every 8 hours  dextrose 5%. 1000 milliLiter(s) (50 mL/Hr) IV Continuous <Continuous>  dextrose 50% Injectable 12.5 Gram(s) IV Push once  dextrose 50% Injectable 25 Gram(s) IV Push once  dextrose 50% Injectable 25 Gram(s) IV Push once  insulin glargine Injectable (LANTUS) 40 Unit(s) SubCutaneous at bedtime  insulin lispro Injectable (HumaLOG) 24 Unit(s) SubCutaneous three times a day before meals  piperacillin/tazobactam IVPB.. 3.375 Gram(s) IV Intermittent Once    MEDICATIONS  (PRN):  dextrose 40% Gel 15 Gram(s) Oral once PRN Blood Glucose LESS THAN 70 milliGRAM(s)/deciliter  glucagon  Injectable 1 milliGRAM(s) IntraMuscular once PRN Glucose LESS THAN 70 milligrams/deciliter      Allergies    No Known Allergies    Intolerances        VITALS:    Vital Signs Last 24 Hrs  T(C): 36.8 (22 Sep 2019 00:34), Max: 37.3 (21 Sep 2019 21:30)  T(F): 98.3 (22 Sep 2019 00:34), Max: 99.1 (21 Sep 2019 21:30)  HR: 93 (22 Sep 2019 00:34) (84 - 111)  BP: 128/78 (22 Sep 2019 00:34) (110/63 - 146/93)  BP(mean): --  RR: 17 (22 Sep 2019 00:34) (17 - 18)  SpO2: 100% (22 Sep 2019 00:34) (100% - 100%)    LABS:                          15.3   11.19 )-----------( 329      ( 21 Sep 2019 16:45 )             46.8       09-21    136  |  99  |  15  ----------------------------<  231<H>  4.3   |  25  |  0.64    Ca    9.4      21 Sep 2019 16:45  Phos  2.1     09-21  Mg     1.9     09-21    TPro  8.0  /  Alb  4.2  /  TBili  0.5  /  DBili  x   /  AST  23  /  ALT  35  /  AlkPhos  91  09-21      CAPILLARY BLOOD GLUCOSE      POCT Blood Glucose.: 279 mg/dL (21 Sep 2019 15:30)      PT/INR - ( 21 Sep 2019 16:45 )   PT: 11.4 SEC;   INR: 1.03          PTT - ( 21 Sep 2019 16:45 )  PTT:31.0 SEC    LOWER EXTREMITY PHYSICAL EXAM:    Vascular: DP/PT 2/4 B/L, CFT <3 seconds B/L, Temperature gradient warm to cool  B/L  Neuro: Epicritic sensation diminished to the level of toes B/L  Skin: Left foot wound located under left fifth digit measuring 2 mm x 2 mm and probes down to subQ, wound bed is fibrous w/ no discharge/purulence/malodor, does not track, does not undermine, erythematous fifth digit, mild edema noted on left foot     RADIOLOGY & ADDITIONAL STUDIES:  < from: Xray Toes, Left Foot (09.21.19 @ 17:53) >    EXAM:  RAD TOE(S) LEFT FOOT        PROCEDURE DATE:  Sep 21 2019         INTERPRETATION:  CLINICAL INDICATION: diabetic; left foot 5th toe pain    EXAM:  Frontal, oblique, and lateral left 5th toe from 9/21/2019 at 1753. No   prior left foot radiographs available for comparison.     IMPRESSION:  No tracking soft tissue gas collections, radiopaque foreign bodies, or   gross radiographic evidence for osteomyelitis.    No fractures, dislocations, or osseous or joint deformities.    Preserved joint spaces with smooth and intact articular surfaces and no   joint margin erosions.    No cortical destruction, periosteal reaction, or lytic or blastic lesions.                    ELVIS MILLER M.D., ATTENDING RADIOLOGIST  This document has been electronically signed. Sep 21 2019  6:12PM        < end of copied text >

## 2019-09-22 NOTE — ED CDU PROVIDER DISPOSITION NOTE - PATIENT PORTAL LINK FT
You can access the FollowMyHealth Patient Portal offered by [facility] by registering at the following website: [email or web address]. By joining [facility]’s FollowMyHealth portal, you will also be able to view your health information using other applications (apps) compatible with our system.

## 2019-09-22 NOTE — ED CDU PROVIDER INITIAL DAY NOTE - PROGRESS NOTE DETAILS
Patient reexamined at bedside. NAD, vitals stable. patient complete course of abx, wbc on downward trend. patient clear by podiatry. he will be d/c home w/ abx and f/u in clinic in 1 week

## 2019-09-22 NOTE — ED CDU PROVIDER DISPOSITION NOTE - PRINCIPAL DIAGNOSIS
Diabetic ulcer of other part of right foot associated with diabetes mellitus due to underlying condition, limited to breakdown of skin

## 2019-09-26 LAB
BACTERIA BLD CULT: SIGNIFICANT CHANGE UP
BACTERIA BLD CULT: SIGNIFICANT CHANGE UP

## 2019-10-01 ENCOUNTER — OUTPATIENT (OUTPATIENT)
Dept: OUTPATIENT SERVICES | Facility: HOSPITAL | Age: 34
LOS: 1 days | End: 2019-10-01
Payer: MEDICAID

## 2019-10-01 DIAGNOSIS — Z90.49 ACQUIRED ABSENCE OF OTHER SPECIFIED PARTS OF DIGESTIVE TRACT: Chronic | ICD-10-CM

## 2019-10-01 DIAGNOSIS — Z89.421 ACQUIRED ABSENCE OF OTHER RIGHT TOE(S): Chronic | ICD-10-CM

## 2019-10-01 DIAGNOSIS — Z98.890 OTHER SPECIFIED POSTPROCEDURAL STATES: Chronic | ICD-10-CM

## 2019-10-01 PROCEDURE — G9001: CPT

## 2019-10-15 DIAGNOSIS — Z71.89 OTHER SPECIFIED COUNSELING: ICD-10-CM

## 2019-12-25 ENCOUNTER — INPATIENT (INPATIENT)
Facility: HOSPITAL | Age: 34
LOS: 5 days | Discharge: HOME | End: 2019-12-31
Attending: STUDENT IN AN ORGANIZED HEALTH CARE EDUCATION/TRAINING PROGRAM | Admitting: STUDENT IN AN ORGANIZED HEALTH CARE EDUCATION/TRAINING PROGRAM
Payer: MEDICAID

## 2019-12-25 VITALS
OXYGEN SATURATION: 100 % | RESPIRATION RATE: 16 BRPM | DIASTOLIC BLOOD PRESSURE: 91 MMHG | SYSTOLIC BLOOD PRESSURE: 142 MMHG | HEART RATE: 110 BPM | TEMPERATURE: 98 F

## 2019-12-25 DIAGNOSIS — M86.9 OSTEOMYELITIS, UNSPECIFIED: ICD-10-CM

## 2019-12-25 DIAGNOSIS — Z89.421 ACQUIRED ABSENCE OF OTHER RIGHT TOE(S): Chronic | ICD-10-CM

## 2019-12-25 DIAGNOSIS — Z02.9 ENCOUNTER FOR ADMINISTRATIVE EXAMINATIONS, UNSPECIFIED: ICD-10-CM

## 2019-12-25 DIAGNOSIS — M86.172 OTHER ACUTE OSTEOMYELITIS, LEFT ANKLE AND FOOT: ICD-10-CM

## 2019-12-25 DIAGNOSIS — Z98.890 OTHER SPECIFIED POSTPROCEDURAL STATES: Chronic | ICD-10-CM

## 2019-12-25 DIAGNOSIS — E10.65 TYPE 1 DIABETES MELLITUS WITH HYPERGLYCEMIA: ICD-10-CM

## 2019-12-25 DIAGNOSIS — Z90.49 ACQUIRED ABSENCE OF OTHER SPECIFIED PARTS OF DIGESTIVE TRACT: Chronic | ICD-10-CM

## 2019-12-25 DIAGNOSIS — Z29.9 ENCOUNTER FOR PROPHYLACTIC MEASURES, UNSPECIFIED: ICD-10-CM

## 2019-12-25 LAB
ANION GAP SERPL CALC-SCNC: 9 MMO/L — SIGNIFICANT CHANGE UP (ref 7–14)
BASOPHILS # BLD AUTO: 0.06 K/UL — SIGNIFICANT CHANGE UP (ref 0–0.2)
BASOPHILS NFR BLD AUTO: 0.5 % — SIGNIFICANT CHANGE UP (ref 0–2)
BUN SERPL-MCNC: 13 MG/DL — SIGNIFICANT CHANGE UP (ref 7–23)
CALCIUM SERPL-MCNC: 9.8 MG/DL — SIGNIFICANT CHANGE UP (ref 8.4–10.5)
CHLORIDE SERPL-SCNC: 99 MMOL/L — SIGNIFICANT CHANGE UP (ref 98–107)
CO2 SERPL-SCNC: 27 MMOL/L — SIGNIFICANT CHANGE UP (ref 22–31)
CREAT SERPL-MCNC: 0.7 MG/DL — SIGNIFICANT CHANGE UP (ref 0.5–1.3)
CRP SERPL-MCNC: 32 MG/L — HIGH
EOSINOPHIL # BLD AUTO: 0.16 K/UL — SIGNIFICANT CHANGE UP (ref 0–0.5)
EOSINOPHIL NFR BLD AUTO: 1.2 % — SIGNIFICANT CHANGE UP (ref 0–6)
ERYTHROCYTE [SEDIMENTATION RATE] IN BLOOD: 28 MM/HR — HIGH (ref 1–15)
GLUCOSE SERPL-MCNC: 188 MG/DL — HIGH (ref 70–99)
GRAM STN SPEC: SIGNIFICANT CHANGE UP
HCT VFR BLD CALC: 45.1 % — SIGNIFICANT CHANGE UP (ref 39–50)
HGB BLD-MCNC: 14.7 G/DL — SIGNIFICANT CHANGE UP (ref 13–17)
IMM GRANULOCYTES NFR BLD AUTO: 0.2 % — SIGNIFICANT CHANGE UP (ref 0–1.5)
LYMPHOCYTES # BLD AUTO: 1.96 K/UL — SIGNIFICANT CHANGE UP (ref 1–3.3)
LYMPHOCYTES # BLD AUTO: 15 % — SIGNIFICANT CHANGE UP (ref 13–44)
MCHC RBC-ENTMCNC: 27.7 PG — SIGNIFICANT CHANGE UP (ref 27–34)
MCHC RBC-ENTMCNC: 32.6 % — SIGNIFICANT CHANGE UP (ref 32–36)
MCV RBC AUTO: 85.1 FL — SIGNIFICANT CHANGE UP (ref 80–100)
MONOCYTES # BLD AUTO: 0.8 K/UL — SIGNIFICANT CHANGE UP (ref 0–0.9)
MONOCYTES NFR BLD AUTO: 6.1 % — SIGNIFICANT CHANGE UP (ref 2–14)
NEUTROPHILS # BLD AUTO: 10.09 K/UL — HIGH (ref 1.8–7.4)
NEUTROPHILS NFR BLD AUTO: 77 % — SIGNIFICANT CHANGE UP (ref 43–77)
NRBC # FLD: 0 K/UL — SIGNIFICANT CHANGE UP (ref 0–0)
PLATELET # BLD AUTO: 317 K/UL — SIGNIFICANT CHANGE UP (ref 150–400)
PMV BLD: 9.7 FL — SIGNIFICANT CHANGE UP (ref 7–13)
POTASSIUM SERPL-MCNC: 4.6 MMOL/L — SIGNIFICANT CHANGE UP (ref 3.5–5.3)
POTASSIUM SERPL-SCNC: 4.6 MMOL/L — SIGNIFICANT CHANGE UP (ref 3.5–5.3)
RBC # BLD: 5.3 M/UL — SIGNIFICANT CHANGE UP (ref 4.2–5.8)
RBC # FLD: 12.7 % — SIGNIFICANT CHANGE UP (ref 10.3–14.5)
SODIUM SERPL-SCNC: 135 MMOL/L — SIGNIFICANT CHANGE UP (ref 135–145)
SPECIMEN SOURCE: SIGNIFICANT CHANGE UP
WBC # BLD: 13.1 K/UL — HIGH (ref 3.8–10.5)
WBC # FLD AUTO: 13.1 K/UL — HIGH (ref 3.8–10.5)

## 2019-12-25 PROCEDURE — 73660 X-RAY EXAM OF TOE(S): CPT | Mod: 26,LT

## 2019-12-25 PROCEDURE — 99223 1ST HOSP IP/OBS HIGH 75: CPT

## 2019-12-25 RX ORDER — GLUCAGON INJECTION, SOLUTION 0.5 MG/.1ML
1 INJECTION, SOLUTION SUBCUTANEOUS ONCE
Refills: 0 | Status: DISCONTINUED | OUTPATIENT
Start: 2019-12-25 | End: 2019-12-31

## 2019-12-25 RX ORDER — INSULIN LISPRO 100/ML
VIAL (ML) SUBCUTANEOUS AT BEDTIME
Refills: 0 | Status: DISCONTINUED | OUTPATIENT
Start: 2019-12-25 | End: 2019-12-30

## 2019-12-25 RX ORDER — DEXTROSE 50 % IN WATER 50 %
15 SYRINGE (ML) INTRAVENOUS ONCE
Refills: 0 | Status: DISCONTINUED | OUTPATIENT
Start: 2019-12-25 | End: 2019-12-31

## 2019-12-25 RX ORDER — CEFEPIME 1 G/1
1000 INJECTION, POWDER, FOR SOLUTION INTRAMUSCULAR; INTRAVENOUS ONCE
Refills: 0 | Status: COMPLETED | OUTPATIENT
Start: 2019-12-25 | End: 2019-12-25

## 2019-12-25 RX ORDER — INSULIN GLARGINE 100 [IU]/ML
40 INJECTION, SOLUTION SUBCUTANEOUS AT BEDTIME
Refills: 0 | Status: DISCONTINUED | OUTPATIENT
Start: 2019-12-25 | End: 2019-12-25

## 2019-12-25 RX ORDER — VANCOMYCIN HCL 1 G
1000 VIAL (EA) INTRAVENOUS ONCE
Refills: 0 | Status: COMPLETED | OUTPATIENT
Start: 2019-12-25 | End: 2019-12-25

## 2019-12-25 RX ORDER — SODIUM CHLORIDE 9 MG/ML
1000 INJECTION, SOLUTION INTRAVENOUS
Refills: 0 | Status: DISCONTINUED | OUTPATIENT
Start: 2019-12-25 | End: 2019-12-31

## 2019-12-25 RX ORDER — IBUPROFEN 200 MG
600 TABLET ORAL ONCE
Refills: 0 | Status: COMPLETED | OUTPATIENT
Start: 2019-12-25 | End: 2019-12-25

## 2019-12-25 RX ORDER — CEFEPIME 1 G/1
1000 INJECTION, POWDER, FOR SOLUTION INTRAMUSCULAR; INTRAVENOUS EVERY 8 HOURS
Refills: 0 | Status: DISCONTINUED | OUTPATIENT
Start: 2019-12-25 | End: 2019-12-30

## 2019-12-25 RX ORDER — DEXTROSE 50 % IN WATER 50 %
25 SYRINGE (ML) INTRAVENOUS ONCE
Refills: 0 | Status: DISCONTINUED | OUTPATIENT
Start: 2019-12-25 | End: 2019-12-31

## 2019-12-25 RX ORDER — INFLUENZA VIRUS VACCINE 15; 15; 15; 15 UG/.5ML; UG/.5ML; UG/.5ML; UG/.5ML
0.5 SUSPENSION INTRAMUSCULAR ONCE
Refills: 0 | Status: DISCONTINUED | OUTPATIENT
Start: 2019-12-25 | End: 2019-12-31

## 2019-12-25 RX ORDER — INSULIN GLARGINE 100 [IU]/ML
25 INJECTION, SOLUTION SUBCUTANEOUS AT BEDTIME
Refills: 0 | Status: DISCONTINUED | OUTPATIENT
Start: 2019-12-25 | End: 2019-12-28

## 2019-12-25 RX ORDER — INSULIN LISPRO 100/ML
VIAL (ML) SUBCUTANEOUS
Refills: 0 | Status: DISCONTINUED | OUTPATIENT
Start: 2019-12-25 | End: 2019-12-30

## 2019-12-25 RX ORDER — DEXTROSE 50 % IN WATER 50 %
12.5 SYRINGE (ML) INTRAVENOUS ONCE
Refills: 0 | Status: DISCONTINUED | OUTPATIENT
Start: 2019-12-25 | End: 2019-12-31

## 2019-12-25 RX ORDER — VANCOMYCIN HCL 1 G
1000 VIAL (EA) INTRAVENOUS EVERY 12 HOURS
Refills: 0 | Status: DISCONTINUED | OUTPATIENT
Start: 2019-12-25 | End: 2019-12-26

## 2019-12-25 RX ORDER — ACETAMINOPHEN 500 MG
325 TABLET ORAL EVERY 4 HOURS
Refills: 0 | Status: DISCONTINUED | OUTPATIENT
Start: 2019-12-25 | End: 2019-12-31

## 2019-12-25 RX ORDER — INSULIN LISPRO 100/ML
15 VIAL (ML) SUBCUTANEOUS
Refills: 0 | Status: DISCONTINUED | OUTPATIENT
Start: 2019-12-25 | End: 2019-12-28

## 2019-12-25 RX ORDER — INSULIN GLARGINE 100 [IU]/ML
35 INJECTION, SOLUTION SUBCUTANEOUS AT BEDTIME
Refills: 0 | Status: DISCONTINUED | OUTPATIENT
Start: 2019-12-25 | End: 2019-12-25

## 2019-12-25 RX ORDER — INSULIN LISPRO 100/ML
12 VIAL (ML) SUBCUTANEOUS
Refills: 0 | Status: DISCONTINUED | OUTPATIENT
Start: 2019-12-25 | End: 2019-12-25

## 2019-12-25 RX ADMIN — Medication 250 MILLIGRAM(S): at 23:28

## 2019-12-25 RX ADMIN — Medication 600 MILLIGRAM(S): at 19:29

## 2019-12-25 RX ADMIN — CEFEPIME 100 MILLIGRAM(S): 1 INJECTION, POWDER, FOR SOLUTION INTRAMUSCULAR; INTRAVENOUS at 22:42

## 2019-12-25 NOTE — CONSULT NOTE ADULT - SUBJECTIVE AND OBJECTIVE BOX
Podiatry pager #: [phone] ([facility])/ [phone] ([facility])    Patient is a 34y old  Male who presents with a chief complaint of Osteomyelitis (25 Dec 2019 22:54)      HPI:  35 yo M hx T1DM, HLD, recurrent foot infection presenting due to weeks of left 5th toe pain. Patient reported foot has been swelling more than usual and bleeding. He also noticed occasional numbness and tingling of the toe. No hx of amputation or other procedure on the affected foot. Patient uses tylenol and aleve for pain at home. Pain is so severe that is having difficulty walking on the toe. Of note, patient was admitted to CDU in Sept 2019 for similar complaints. Received IV abx and discharged with podiatry f/u. Prior wound culture hx of MRSA positive results. 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 1000 milliLiter(s) (50 mL/Hr) IV Continuous <Continuous>  dextrose 50% Injectable 12.5 Gram(s) IV Push once  dextrose 50% Injectable 25 Gram(s) IV Push once  dextrose 50% Injectable 25 Gram(s) IV Push once  insulin glargine Injectable (LANTUS) 25 Unit(s) SubCutaneous at bedtime  insulin lispro (HumaLOG) corrective regimen sliding scale   SubCutaneous three times a day before meals  insulin lispro (HumaLOG) corrective regimen sliding scale   SubCutaneous at bedtime  insulin lispro Injectable (HumaLOG) 15 Unit(s) SubCutaneous three times a day before meals  vancomycin  IVPB 1000 milliGRAM(s) IV Intermittent every 12 hours    MEDICATIONS  (PRN):  acetaminophen   Tablet .. 325 milliGRAM(s) Oral every 4 hours PRN Moderate Pain (4 - 6), Severe Pain (7 - 10)  dextrose 40% Gel 15 Gram(s) Oral once PRN Blood Glucose LESS THAN 70 milliGRAM(s)/deciliter  glucagon  Injectable 1 milliGRAM(s) IntraMuscular once PRN Glucose LESS THAN 70 milligrams/deciliter      Allergies    No Known Allergies    Intolerances        VITALS:    Vital Signs Last 24 Hrs  T(C): 36.7 (25 Dec 2019 23:00), Max: 36.7 (25 Dec 2019 23:00)  T(F): 98.1 (25 Dec 2019 23:00), Max: 98.1 (25 Dec 2019 23:00)  HR: 94 (25 Dec 2019 23:00) (94 - 110)  BP: 119/76 (25 Dec 2019 23:00) (119/76 - 142/91)  BP(mean): --  RR: 19 (25 Dec 2019 23:00) (16 - 19)  SpO2: 98% (25 Dec 2019 23:00) (98% - 100%)    LABS:                          14.7   13.10 )-----------( 317      ( 25 Dec 2019 18:20 )             45.1       12-25    135  |  99  |  13  ----------------------------<  188<H>  4.6   |  27  |  0.70    Ca    9.8      25 Dec 2019 18:20        CAPILLARY BLOOD GLUCOSE      POCT Blood Glucose.: 98 mg/dL (25 Dec 2019 22:39)  POCT Blood Glucose.: 202 mg/dL (25 Dec 2019 17:01)          LOWER EXTREMITY PHYSICAL EXAM:    Vascular: DP/PT 1/4 R, non palpable L, CFT <3 seconds B/L- except for L 5th digit which was sluggish, Temperature gradient warm to cool B/L  Neuro: Epicritic sensation diminished to the level of toes B/L  Musculoskeletal/Ortho: R foot 5th digit amp  Skin: L foot: dusky 5th digit, 5th digit fibrogranular wound bed down to bone, scant purulence, no malodor, no fluctuance, no crepitus, diffuse edema throughout L foot      RADIOLOGY & ADDITIONAL STUDIES:    < from: Xray Toes, Left Foot (12.25.19 @ 18:40) >  ******PRELIMINARY REPORT******    ******PRELIMINARY REPORT******            EXAM:  RAD TOE(S) LEFT FOOT        PROCEDURE DATE:  Dec 25 2019     ******PRELIMINARY REPORT******    ******PRELIMINARY REPORT******            INTERPRETATION:  cortical erosion along the lateral aspect of the mid 5th phalanx may represent osteomyelitis            ******PRELIMINARY REPORT******    ******PRELIMINARY REPORT******          DARYN LEIVA M.D., RADIOLOGY RESIDENT    < end of copied text >

## 2019-12-25 NOTE — H&P ADULT - HISTORY OF PRESENT ILLNESS
35 yo M hx T1DM, HLD, recurrent foot infection presenting due to weeks of left 5th toe pain. Patient reported foot has been swelling more than usual and bleeding. He also noticed occasional numbness and tingling of the toe. No hx of amputation or other procedure on the affected foot. Patient uses tylenol and aleve for pain at home. Pain is so severe that is having difficulty walking on the toe. Of note, patient was admitted to CDU in Sept 2019 for similar complaints. Received IV abx and discharged with podiatry f/u. Prior wound culture hx of MRSA positive results.

## 2019-12-25 NOTE — CONSULT NOTE ADULT - ASSESSMENT
35 yo M pt w/ L foot 5th digit wound  - pt seen and evaluated  - L foot: dusky 5th digit, 5th digit fibrogranular wound bed down to bone, scant purulence, no malodor, no fluctuance, no crepitus, diffuse edema throughout L foot  - wound culture currently growing GPCs  - XR positive for OM of L 5th middle phalanx  - ordered EDDI/PVR  - pod plan pending EDDI/PVR  - discussed w/ attending

## 2019-12-25 NOTE — ED PROVIDER NOTE - MUSCULOSKELETAL, MLM
Spine appears normal, range of motion is not limited, no muscle or joint tenderness/ B/l DP pulses intact; L fifth toe flexion and extension intact and sensation intact to light touch grossly. Remainder of feet wnl for pt

## 2019-12-25 NOTE — H&P ADULT - PROBLEM SELECTOR PLAN 1
- Xray concerning for OM.   - F/u further pod recs  - C/w vanc and cefepime for now  - F/u cultures  - Monitor for fever and leukocytosis curve.

## 2019-12-25 NOTE — H&P ADULT - PROBLEM SELECTOR PLAN 2
- Continue to lantus 40u and premeal 15u while inpatient.  - F/u FS QID; cover with SSI. - Continue to lantus 25u and premeal 15u while inpatient. (uses 35u lantus and 24u premeals at home)  - F/u FS QID; cover with SSI.

## 2019-12-25 NOTE — H&P ADULT - NSHPPHYSICALEXAM_GEN_ALL_CORE
T(C): 36.6 (12-25-19 @ 16:59), Max: 36.6 (12-25-19 @ 16:59)  HR: 104 (12-25-19 @ 17:49) (104 - 110)  BP: 124/75 (12-25-19 @ 17:49) (124/75 - 142/91)  RR: 16 (12-25-19 @ 17:49) (16 - 16)  SpO2: 100% (12-25-19 @ 17:49) (100% - 100%)  Wt(kg): --  GENERAL: NAD, well-developed  HEAD:  Atraumatic, Normocephalic  EYES: EOMI, PERRLA, conjunctiva and sclera clear  NECK: Supple, No JVD  CHEST/LUNG: Clear to auscultation bilaterally; No wheeze  HEART: Regular rate and rhythm; No murmurs, rubs, or gallops  ABDOMEN: Soft, Nontender, Nondistended; Bowel sounds present  EXTREMITIES:  L foot dressing wrapped, CDI.  PSYCH: AAOx3  NEUROLOGY: non-focal  SKIN: No rashes or lesions

## 2019-12-25 NOTE — ED PROVIDER NOTE - ATTENDING CONTRIBUTION TO CARE
I have personally performed a face to face bedside history and physical examination of this patient. I have discussed the history, examination, review of systems, assessment and plan of management with the resident. I have reviewed the electronic medical record and amended it to reflect my history, review of systems, physical exam, assessment and plan.    34 M hx T1DM, HLD, presenting due to weeks of left 5th toe pain, it has been swelling more than usual and bleeding. Occasional numbness and tingling of the toe. NO hx of amputation or other procedure on the affected foot. Uses occasional tylenol and aleve for pain. Not on any abx now. Pain is so severe that is having difficulty walking on the toe. Has appt on sat to see podiatry but could not wait.  VSS.  Exam with right fifth toe with ulceration.   Suspect osteo, however possibly chronic.  Will send labs, podiatry c/s.  Dispo pending.

## 2019-12-25 NOTE — H&P ADULT - ASSESSMENT
33 yo M hx T1DM, HLD, recurrent foot infection presenting due to weeks of left 5th toe pain. Imaging finding concerning for OM.

## 2019-12-25 NOTE — H&P ADULT - PROBLEM SELECTOR PLAN 4
1.  Name of PCP:   2.  PCP Contacted on Admission: [ ] Y    [x] N    3.  PCP contacted at Discharge: [ ] Y    [ ] N    [ ] N/A  4.  Post-Discharge Appointment Date and Location:  5.  Summary of Handoff given to PCP:

## 2019-12-25 NOTE — ED PROVIDER NOTE - PROGRESS NOTE DETAILS
Patient reassessed, NAD, non-toxic appearing. results dw pt, questions answered. eval by podiatry and state to admit.  - Norberto Ca D.O. PGY2

## 2019-12-25 NOTE — H&P ADULT - NSHPSOCIALHISTORY_GEN_ALL_CORE
.  Lives with roommates.  [place]  Works as a   No history of alcohol abuse  Active smoker (~ < 0.5 ppd) x 18 years  No drug use

## 2019-12-25 NOTE — H&P ADULT - NSICDXFAMILYHX_GEN_ALL_CORE_FT
FAMILY HISTORY:  Grandparent  Still living? Unknown  Family history of diabetes mellitus, Age at diagnosis: Age Unknown

## 2019-12-25 NOTE — ED PROVIDER NOTE - OBJECTIVE STATEMENT
34 M hx T1DM, HLD, presenting due to weeks of left 5th toe pain, it has been swelling more than usual and bleeding. Occasional numbness and tingling of the toe. NO hx of amputation or other procedure on the affected foot. Uses occasional tylenol and aleve for pain. Not on any abx now. Pain is so severe that is having difficulty walking on the toe. Has appt on sat to see podiatry but could not wait.    Denies fever, chills, vomiting, abd pain, foul odor, recent falls/trauma.    Podiatry Tg [facility]

## 2019-12-25 NOTE — ED ADULT NURSE NOTE - OBJECTIVE STATEMENT
Pt rec'd in 24, PMH of DM, presents with diabetic foot ulcer to left pinky toe, some drainage noted. Pt states he's had the ulcer since September, it's been healing, but for the past few days he noticed increased drainage with some bleeding. Pt had history of same on the right foot, had an amputation of the right pinky toe. Denies fevers/chills.

## 2019-12-25 NOTE — H&P ADULT - NSICDXPASTSURGICALHX_GEN_ALL_CORE_FT
PAST SURGICAL HISTORY:  H/O amputation of lesser toe, right ~ 5th    H/O eye surgery ~ left, due to Retinopathy    History of appendectomy

## 2019-12-25 NOTE — ED PROVIDER NOTE - CLINICAL SUMMARY MEDICAL DECISION MAKING FREE TEXT BOX
Pt p/w chronic toe ulcer in setting of diabetes; on exam is not toxic appearing wound. C/f osteomyelitis vs soft tissue infection. Plan; Labs XR IVF possible abx and admission

## 2019-12-25 NOTE — H&P ADULT - NSHPREVIEWOFSYSTEMS_GEN_ALL_CORE
CONSTITUTIONAL: No weakness, fevers or chills  EYES/ENT: No visual changes;  No vertigo or throat pain   NECK: No pain or stiffness  RESPIRATORY: No cough, wheezing, hemoptysis; No shortness of breath  CARDIOVASCULAR: No chest pain or palpitations  GASTROINTESTINAL: No abdominal or epigastric pain. No nausea, vomiting, or hematemesis; No diarrhea or constipation. No melena or hematochezia.  GENITOURINARY: No dysuria, frequency or hematuria  NEUROLOGICAL: No numbness or weakness  MSK: left foot pain and swelling  SKIN: No itching, burning, rashes, or lesions   PSYCH: No Depression, no anxiety  All other review of systems is negative unless indicated above.

## 2019-12-25 NOTE — ED ADULT TRIAGE NOTE - CHIEF COMPLAINT QUOTE
Has diabetic ulcer on L foot x3 months which had been heeling, x3 days pt states wound has been more painful, erythematous, and is draining blood. Denies any fevers/ chills/ other complaints.

## 2019-12-25 NOTE — H&P ADULT - NSHPLABSRESULTS_GEN_ALL_CORE
(12-25 @ 18:20)                      14.7  13.10 )-----------( 317                 45.1    Neutrophils = 10.09 (77.0%)  Lymphocytes = 1.96 (15.0%)  Eosinophils = 0.16 (1.2%)  Basophils = 0.06 (0.5%)  Monocytes = 0.80 (6.1%)  Bands = --%    12-25    135  |  99  |  13  ----------------------------<  188<H>  4.6   |  27  |  0.70    Ca    9.8      25 Dec 2019 18:20

## 2019-12-26 ENCOUNTER — TRANSCRIPTION ENCOUNTER (OUTPATIENT)
Age: 34
End: 2019-12-26

## 2019-12-26 DIAGNOSIS — F17.200 NICOTINE DEPENDENCE, UNSPECIFIED, UNCOMPLICATED: ICD-10-CM

## 2019-12-26 DIAGNOSIS — E83.39 OTHER DISORDERS OF PHOSPHORUS METABOLISM: ICD-10-CM

## 2019-12-26 DIAGNOSIS — R94.31 ABNORMAL ELECTROCARDIOGRAM [ECG] [EKG]: ICD-10-CM

## 2019-12-26 DIAGNOSIS — A41.9 SEPSIS, UNSPECIFIED ORGANISM: ICD-10-CM

## 2019-12-26 LAB
ALBUMIN SERPL ELPH-MCNC: 3.9 G/DL — SIGNIFICANT CHANGE UP (ref 3.3–5)
ALP SERPL-CCNC: 96 U/L — SIGNIFICANT CHANGE UP (ref 40–120)
ALT FLD-CCNC: 29 U/L — SIGNIFICANT CHANGE UP (ref 4–41)
ANION GAP SERPL CALC-SCNC: 15 MMO/L — HIGH (ref 7–14)
AST SERPL-CCNC: 22 U/L — SIGNIFICANT CHANGE UP (ref 4–40)
BASOPHILS # BLD AUTO: 0.05 K/UL — SIGNIFICANT CHANGE UP (ref 0–0.2)
BASOPHILS NFR BLD AUTO: 0.5 % — SIGNIFICANT CHANGE UP (ref 0–2)
BILIRUB SERPL-MCNC: 0.6 MG/DL — SIGNIFICANT CHANGE UP (ref 0.2–1.2)
BUN SERPL-MCNC: 12 MG/DL — SIGNIFICANT CHANGE UP (ref 7–23)
CALCIUM SERPL-MCNC: 9.6 MG/DL — SIGNIFICANT CHANGE UP (ref 8.4–10.5)
CHLORIDE SERPL-SCNC: 95 MMOL/L — LOW (ref 98–107)
CK MB BLD-MCNC: 1.6 NG/ML — SIGNIFICANT CHANGE UP (ref 1–6.6)
CK MB BLD-MCNC: SIGNIFICANT CHANGE UP (ref 0–2.5)
CK SERPL-CCNC: 103 U/L — SIGNIFICANT CHANGE UP (ref 30–200)
CO2 SERPL-SCNC: 23 MMOL/L — SIGNIFICANT CHANGE UP (ref 22–31)
CREAT SERPL-MCNC: 0.72 MG/DL — SIGNIFICANT CHANGE UP (ref 0.5–1.3)
EOSINOPHIL # BLD AUTO: 0.07 K/UL — SIGNIFICANT CHANGE UP (ref 0–0.5)
EOSINOPHIL NFR BLD AUTO: 0.6 % — SIGNIFICANT CHANGE UP (ref 0–6)
GLUCOSE BLDC GLUCOMTR-MCNC: 239 MG/DL — HIGH (ref 70–99)
GLUCOSE BLDC GLUCOMTR-MCNC: 252 MG/DL — HIGH (ref 70–99)
GLUCOSE SERPL-MCNC: 217 MG/DL — HIGH (ref 70–99)
HCT VFR BLD CALC: 45.6 % — SIGNIFICANT CHANGE UP (ref 39–50)
HGB BLD-MCNC: 14.9 G/DL — SIGNIFICANT CHANGE UP (ref 13–17)
IMM GRANULOCYTES NFR BLD AUTO: 0.3 % — SIGNIFICANT CHANGE UP (ref 0–1.5)
LYMPHOCYTES # BLD AUTO: 1.22 K/UL — SIGNIFICANT CHANGE UP (ref 1–3.3)
LYMPHOCYTES # BLD AUTO: 11 % — LOW (ref 13–44)
MAGNESIUM SERPL-MCNC: 1.7 MG/DL — SIGNIFICANT CHANGE UP (ref 1.6–2.6)
MCHC RBC-ENTMCNC: 27.7 PG — SIGNIFICANT CHANGE UP (ref 27–34)
MCHC RBC-ENTMCNC: 32.7 % — SIGNIFICANT CHANGE UP (ref 32–36)
MCV RBC AUTO: 84.9 FL — SIGNIFICANT CHANGE UP (ref 80–100)
MONOCYTES # BLD AUTO: 0.72 K/UL — SIGNIFICANT CHANGE UP (ref 0–0.9)
MONOCYTES NFR BLD AUTO: 6.5 % — SIGNIFICANT CHANGE UP (ref 2–14)
NEUTROPHILS # BLD AUTO: 9 K/UL — HIGH (ref 1.8–7.4)
NEUTROPHILS NFR BLD AUTO: 81.1 % — HIGH (ref 43–77)
NRBC # FLD: 0 K/UL — SIGNIFICANT CHANGE UP (ref 0–0)
PHOSPHATE SERPL-MCNC: 2.2 MG/DL — LOW (ref 2.5–4.5)
PLATELET # BLD AUTO: 292 K/UL — SIGNIFICANT CHANGE UP (ref 150–400)
PMV BLD: 10 FL — SIGNIFICANT CHANGE UP (ref 7–13)
POTASSIUM SERPL-MCNC: 4.2 MMOL/L — SIGNIFICANT CHANGE UP (ref 3.5–5.3)
POTASSIUM SERPL-SCNC: 4.2 MMOL/L — SIGNIFICANT CHANGE UP (ref 3.5–5.3)
PROT SERPL-MCNC: 7.6 G/DL — SIGNIFICANT CHANGE UP (ref 6–8.3)
RBC # BLD: 5.37 M/UL — SIGNIFICANT CHANGE UP (ref 4.2–5.8)
RBC # FLD: 12.9 % — SIGNIFICANT CHANGE UP (ref 10.3–14.5)
SODIUM SERPL-SCNC: 133 MMOL/L — LOW (ref 135–145)
SPECIMEN SOURCE: SIGNIFICANT CHANGE UP
SPECIMEN SOURCE: SIGNIFICANT CHANGE UP
TROPONIN T, HIGH SENSITIVITY: 11 NG/L — SIGNIFICANT CHANGE UP (ref ?–14)
TROPONIN T, HIGH SENSITIVITY: 13 NG/L — SIGNIFICANT CHANGE UP (ref ?–14)
WBC # BLD: 11.09 K/UL — HIGH (ref 3.8–10.5)
WBC # FLD AUTO: 11.09 K/UL — HIGH (ref 3.8–10.5)

## 2019-12-26 PROCEDURE — 99222 1ST HOSP IP/OBS MODERATE 55: CPT

## 2019-12-26 PROCEDURE — 99233 SBSQ HOSP IP/OBS HIGH 50: CPT

## 2019-12-26 PROCEDURE — 93923 UPR/LXTR ART STDY 3+ LVLS: CPT | Mod: 26

## 2019-12-26 PROCEDURE — 71045 X-RAY EXAM CHEST 1 VIEW: CPT | Mod: 26

## 2019-12-26 RX ORDER — SODIUM CHLORIDE 9 MG/ML
1000 INJECTION, SOLUTION INTRAVENOUS
Refills: 0 | Status: DISCONTINUED | OUTPATIENT
Start: 2019-12-26 | End: 2019-12-27

## 2019-12-26 RX ORDER — HEPARIN SODIUM 5000 [USP'U]/ML
5000 INJECTION INTRAVENOUS; SUBCUTANEOUS
Refills: 0 | Status: DISCONTINUED | OUTPATIENT
Start: 2019-12-26 | End: 2019-12-31

## 2019-12-26 RX ORDER — VANCOMYCIN HCL 1 G
1000 VIAL (EA) INTRAVENOUS EVERY 8 HOURS
Refills: 0 | Status: DISCONTINUED | OUTPATIENT
Start: 2019-12-26 | End: 2019-12-28

## 2019-12-26 RX ORDER — SODIUM CHLORIDE 9 MG/ML
1000 INJECTION INTRAMUSCULAR; INTRAVENOUS; SUBCUTANEOUS
Refills: 0 | Status: DISCONTINUED | OUTPATIENT
Start: 2019-12-26 | End: 2019-12-27

## 2019-12-26 RX ORDER — ACETAMINOPHEN 500 MG
650 TABLET ORAL ONCE
Refills: 0 | Status: COMPLETED | OUTPATIENT
Start: 2019-12-26 | End: 2019-12-26

## 2019-12-26 RX ORDER — ACETAMINOPHEN 500 MG
1000 TABLET ORAL ONCE
Refills: 0 | Status: COMPLETED | OUTPATIENT
Start: 2019-12-26 | End: 2019-12-26

## 2019-12-26 RX ADMIN — INSULIN GLARGINE 25 UNIT(S): 100 INJECTION, SOLUTION SUBCUTANEOUS at 00:15

## 2019-12-26 RX ADMIN — Medication 400 MILLIGRAM(S): at 06:59

## 2019-12-26 RX ADMIN — Medication 325 MILLIGRAM(S): at 23:19

## 2019-12-26 RX ADMIN — Medication 15 UNIT(S): at 09:16

## 2019-12-26 RX ADMIN — Medication 325 MILLIGRAM(S): at 12:48

## 2019-12-26 RX ADMIN — INSULIN GLARGINE 25 UNIT(S): 100 INJECTION, SOLUTION SUBCUTANEOUS at 22:02

## 2019-12-26 RX ADMIN — Medication 15 UNIT(S): at 17:01

## 2019-12-26 RX ADMIN — CEFEPIME 100 MILLIGRAM(S): 1 INJECTION, POWDER, FOR SOLUTION INTRAMUSCULAR; INTRAVENOUS at 05:41

## 2019-12-26 RX ADMIN — Medication 250 MILLIGRAM(S): at 14:44

## 2019-12-26 RX ADMIN — Medication 3: at 17:01

## 2019-12-26 RX ADMIN — SODIUM CHLORIDE 75 MILLILITER(S): 9 INJECTION, SOLUTION INTRAVENOUS at 12:16

## 2019-12-26 RX ADMIN — Medication 650 MILLIGRAM(S): at 17:09

## 2019-12-26 RX ADMIN — Medication 325 MILLIGRAM(S): at 23:30

## 2019-12-26 RX ADMIN — CEFEPIME 100 MILLIGRAM(S): 1 INJECTION, POWDER, FOR SOLUTION INTRAMUSCULAR; INTRAVENOUS at 14:10

## 2019-12-26 RX ADMIN — Medication 2: at 07:27

## 2019-12-26 RX ADMIN — HEPARIN SODIUM 5000 UNIT(S): 5000 INJECTION INTRAVENOUS; SUBCUTANEOUS at 17:03

## 2019-12-26 RX ADMIN — Medication 15 UNIT(S): at 12:17

## 2019-12-26 RX ADMIN — Medication 650 MILLIGRAM(S): at 15:08

## 2019-12-26 RX ADMIN — Medication 250 MILLIGRAM(S): at 06:59

## 2019-12-26 RX ADMIN — Medication 325 MILLIGRAM(S): at 15:09

## 2019-12-26 RX ADMIN — Medication 250 MILLIGRAM(S): at 22:02

## 2019-12-26 RX ADMIN — CEFEPIME 100 MILLIGRAM(S): 1 INJECTION, POWDER, FOR SOLUTION INTRAMUSCULAR; INTRAVENOUS at 22:02

## 2019-12-26 NOTE — PROGRESS NOTE ADULT - SUBJECTIVE AND OBJECTIVE BOX
PROGRESS NOTE:     Patient is a 34y old  Male who presents with a chief complaint of Osteomyelitis (26 Dec 2019 10:10)      SUBJECTIVE / OVERNIGHT EVENTS:  Tmax of 101.5 this AM     ADDITIONAL REVIEW OF SYSTEMS:    MEDICATIONS  (STANDING):  cefepime   IVPB 1000 milliGRAM(s) IV Intermittent every 8 hours  dextrose 5%. 1000 milliLiter(s) (50 mL/Hr) IV Continuous <Continuous>  dextrose 50% Injectable 12.5 Gram(s) IV Push once  dextrose 50% Injectable 25 Gram(s) IV Push once  dextrose 50% Injectable 25 Gram(s) IV Push once  influenza   Vaccine 0.5 milliLiter(s) IntraMuscular once  insulin glargine Injectable (LANTUS) 25 Unit(s) SubCutaneous at bedtime  insulin lispro (HumaLOG) corrective regimen sliding scale   SubCutaneous three times a day before meals  insulin lispro (HumaLOG) corrective regimen sliding scale   SubCutaneous at bedtime  insulin lispro Injectable (HumaLOG) 15 Unit(s) SubCutaneous three times a day before meals  vancomycin  IVPB 1000 milliGRAM(s) IV Intermittent every 8 hours    MEDICATIONS  (PRN):  acetaminophen   Tablet .. 325 milliGRAM(s) Oral every 4 hours PRN Moderate Pain (4 - 6), Severe Pain (7 - 10)  dextrose 40% Gel 15 Gram(s) Oral once PRN Blood Glucose LESS THAN 70 milliGRAM(s)/deciliter  glucagon  Injectable 1 milliGRAM(s) IntraMuscular once PRN Glucose LESS THAN 70 milligrams/deciliter      CAPILLARY BLOOD GLUCOSE      POCT Blood Glucose.: 237 mg/dL (26 Dec 2019 07:25)  POCT Blood Glucose.: 161 mg/dL (26 Dec 2019 00:13)  POCT Blood Glucose.: 98 mg/dL (25 Dec 2019 22:39)  POCT Blood Glucose.: 202 mg/dL (25 Dec 2019 17:01)    I&O's Summary    25 Dec 2019 07:01  -  26 Dec 2019 07:00  --------------------------------------------------------  IN: 250 mL / OUT: 0 mL / NET: 250 mL        PHYSICAL EXAM:  Vital Signs Last 24 Hrs  T(C): 37.1 (26 Dec 2019 09:36), Max: 38.6 (26 Dec 2019 05:38)  T(F): 98.7 (26 Dec 2019 09:36), Max: 101.5 (26 Dec 2019 05:38)  HR: 102 (26 Dec 2019 09:36) (94 - 130)  BP: 107/71 (26 Dec 2019 09:36) (107/71 - 142/91)  BP(mean): --  RR: 17 (26 Dec 2019 09:36) (16 - 19)  SpO2: 99% (26 Dec 2019 09:36) (98% - 100%)    CONSTITUTIONAL: NAD, well-developed  RESPIRATORY: Normal respiratory effort; lungs are clear to auscultation bilaterally  CARDIOVASCULAR: Regular rate and rhythm, normal S1 and S2, no murmur/rub/gallop; No lower extremity edema; Peripheral pulses are 2+ bilaterally  ABDOMEN: Nontender to palpation, normoactive bowel sounds, no rebound/guarding; No hepatosplenomegaly  MUSCLOSKELETAL: no clubbing or cyanosis of digits; no joint swelling or tenderness to palpation  PSYCH: A+O to person, place, and time; affect appropriate  NEURO:AAOx 3   SKIN: 5th left toes- presence of dusky erythema with minimal drainage      LABS:                        14.9   11.09 )-----------( 292      ( 26 Dec 2019 05:55 )             45.6     12-26    133<L>  |  95<L>  |  12  ----------------------------<  217<H>  4.2   |  23  |  0.72    Ca    9.6      26 Dec 2019 05:55  Phos  2.2     12-26  Mg     1.7     12-26    TPro  7.6  /  Alb  3.9  /  TBili  0.6  /  DBili  x   /  AST  22  /  ALT  29  /  AlkPhos  96  12-26              Culture - Abscess with Gram Stain (collected 25 Dec 2019 22:06)  Source: OTHER        RADIOLOGY & ADDITIONAL TESTS:  Results Reviewed:   Imaging Personally Reviewed:  Electrocardiogram Personally Reviewed:    COORDINATION OF CARE:  Care Discussed with Consultants/Other Providers [Y/N]:  Prior or Outpatient Records Reviewed [Y/N]: PROGRESS NOTE:     Patient is a 34y old  Male who presents with a chief complaint of Osteomyelitis (26 Dec 2019 10:10)      SUBJECTIVE / OVERNIGHT EVENTS:  Tmax of 101.5 this AM   Other wise feels ok   Reports mild pain on left foot on walking       ADDITIONAL REVIEW OF SYSTEMS:    MEDICATIONS  (STANDING):  cefepime   IVPB 1000 milliGRAM(s) IV Intermittent every 8 hours  dextrose 5%. 1000 milliLiter(s) (50 mL/Hr) IV Continuous <Continuous>  dextrose 50% Injectable 12.5 Gram(s) IV Push once  dextrose 50% Injectable 25 Gram(s) IV Push once  dextrose 50% Injectable 25 Gram(s) IV Push once  influenza   Vaccine 0.5 milliLiter(s) IntraMuscular once  insulin glargine Injectable (LANTUS) 25 Unit(s) SubCutaneous at bedtime  insulin lispro (HumaLOG) corrective regimen sliding scale   SubCutaneous three times a day before meals  insulin lispro (HumaLOG) corrective regimen sliding scale   SubCutaneous at bedtime  insulin lispro Injectable (HumaLOG) 15 Unit(s) SubCutaneous three times a day before meals  vancomycin  IVPB 1000 milliGRAM(s) IV Intermittent every 8 hours    MEDICATIONS  (PRN):  acetaminophen   Tablet .. 325 milliGRAM(s) Oral every 4 hours PRN Moderate Pain (4 - 6), Severe Pain (7 - 10)  dextrose 40% Gel 15 Gram(s) Oral once PRN Blood Glucose LESS THAN 70 milliGRAM(s)/deciliter  glucagon  Injectable 1 milliGRAM(s) IntraMuscular once PRN Glucose LESS THAN 70 milligrams/deciliter      CAPILLARY BLOOD GLUCOSE      POCT Blood Glucose.: 237 mg/dL (26 Dec 2019 07:25)  POCT Blood Glucose.: 161 mg/dL (26 Dec 2019 00:13)  POCT Blood Glucose.: 98 mg/dL (25 Dec 2019 22:39)  POCT Blood Glucose.: 202 mg/dL (25 Dec 2019 17:01)    I&O's Summary    25 Dec 2019 07:01  -  26 Dec 2019 07:00  --------------------------------------------------------  IN: 250 mL / OUT: 0 mL / NET: 250 mL        PHYSICAL EXAM:  Vital Signs Last 24 Hrs  T(C): 37.1 (26 Dec 2019 09:36), Max: 38.6 (26 Dec 2019 05:38)  T(F): 98.7 (26 Dec 2019 09:36), Max: 101.5 (26 Dec 2019 05:38)  HR: 102 (26 Dec 2019 09:36) (94 - 130)  BP: 107/71 (26 Dec 2019 09:36) (107/71 - 142/91)  BP(mean): --  RR: 17 (26 Dec 2019 09:36) (16 - 19)  SpO2: 99% (26 Dec 2019 09:36) (98% - 100%)    CONSTITUTIONAL: NAD, well-developed  RESPIRATORY: Normal respiratory effort; lungs are clear to auscultation bilaterally  CARDIOVASCULAR: Regular rate and rhythm, normal S1 and S2, no murmur/rub/gallop; No lower extremity edema;   ABDOMEN: Nontender to palpation, normoactive bowel sounds, no rebound/guarding; No hepatosplenomegaly  MUSCLOSKELETAL: no clubbing or cyanosis of digits; no joint swelling or tenderness to palpation  PSYCH: A+O to person, place, and time; affect appropriate  NEURO:AAOx 3   SKIN: R foot 5th digit  s/p amp  5th left toes- presence of dusky erythema with minimal drainage, 5mm wound with eschar, mild tenderness   Left foot DP/PT - not palpable. Right foot pulses palpable       LABS:                        14.9   11.09 )-----------( 292      ( 26 Dec 2019 05:55 )             45.6     12-26    133<L>  |  95<L>  |  12  ----------------------------<  217<H>  4.2   |  23  |  0.72    Ca    9.6      26 Dec 2019 05:55  Phos  2.2     12-26  Mg     1.7     12-26    TPro  7.6  /  Alb  3.9  /  TBili  0.6  /  DBili  x   /  AST  22  /  ALT  29  /  AlkPhos  96  12-26              Culture - Abscess with Gram Stain (collected 25 Dec 2019 22:06)  Source: OTHER        RADIOLOGY & ADDITIONAL TESTS:  Results Reviewed:   Imaging Personally Reviewed:  Electrocardiogram Personally Reviewed:    COORDINATION OF CARE:  Care Discussed with Consultants/Other Providers [Y/N]:  Prior or Outpatient Records Reviewed [Y/N]:

## 2019-12-26 NOTE — CHART NOTE - NSCHARTNOTEFT_GEN_A_CORE
34M w/ Left foot 5th digit OM  - Pt is scheduled for left foot partial fifth ray resection at Fri 12/27 at 1PM w/ Dr. Blue  - Please document medical clearance/optimization for procedure with light sedation

## 2019-12-26 NOTE — PHYSICAL THERAPY INITIAL EVALUATION ADULT - DISCHARGE DISPOSITION, PT EVAL
anticipate discharge home , patient with pending surgery on left toe , please follow further PT notes.

## 2019-12-26 NOTE — CONSULT NOTE ADULT - SUBJECTIVE AND OBJECTIVE BOX
Rashaad De La Garza MD  Interventional Cardiology / Endovascular Specialist  [place] Office : [phone] [place] [address] [place] [address]  Tel:   [place] Office : [address] [facility] [place] [address]  Tel: [phone]  Cell : [phone]      HISTORY OF PRESENTING ILLNESS:    33 yo M hx T1DM, HLD, recurrent foot infection presenting due to weeks of left 5th toe pain. Patient reported foot has been swelling more than usual and bleeding. He also noticed occasional numbness and tingling of the toe. No hx of amputation or other procedure on the affected foot. Patient uses tylenol and aleve for pain at home. Pain is so severe that is having difficulty walking on the toe. Of note, patient was admitted to CDU in Sept 2019 for similar complaints. Received IV abx and discharged with podiatry f/u. Prior wound culture hx of MRSA positive results.    PAST MEDICAL & SURGICAL HISTORY:  Diabetic retinopathy  Diabetes: ~ diagnosed at 7 years of age  History of appendectomy  H/O eye surgery: ~ left, due to Retinopathy  H/O amputation of lesser toe, right: ~ 5th      SOCIAL HISTORY: Substance Use (street drugs): ( x ) never used  (  ) other:    FAMILY HISTORY:  Family history of diabetes mellitus (Grandparent): parents, grandmother      REVIEW OF SYSTEMS:  CONSTITUTIONAL: No fever, weight loss, or fatigue  EYES: No eye pain, visual disturbances, or discharge  ENMT:  No difficulty hearing, tinnitus, vertigo; No sinus or throat pain  BREASTS: No pain, masses, or nipple discharge  GASTROINTESTINAL: No abdominal or epigastric pain. No nausea, vomiting, or hematemesis; No diarrhea or constipation. No melena or hematochezia.  GENITOURINARY: No dysuria, frequency, hematuria, or incontinence  NEUROLOGICAL: No headaches, memory loss, loss of strength, numbness, or tremors  ENDOCRINE: No heat or cold intolerance; No hair loss  MUSCULOSKELETAL: No joint pain or swelling; No muscle, back, or extremity pain  PSYCHIATRIC: No depression, anxiety, mood swings, or difficulty sleeping  HEME/LYMPH: No easy bruising, or bleeding gums  All others negative    MEDICATIONS:  heparin  Injectable 5000 Unit(s) SubCutaneous two times a day    cefepime   IVPB 1000 milliGRAM(s) IV Intermittent every 8 hours  vancomycin  IVPB 1000 milliGRAM(s) IV Intermittent every 8 hours      acetaminophen   Tablet .. 325 milliGRAM(s) Oral every 4 hours PRN      dextrose 40% Gel 15 Gram(s) Oral once PRN  dextrose 50% Injectable 12.5 Gram(s) IV Push once  dextrose 50% Injectable 25 Gram(s) IV Push once  dextrose 50% Injectable 25 Gram(s) IV Push once  glucagon  Injectable 1 milliGRAM(s) IntraMuscular once PRN  insulin glargine Injectable (LANTUS) 25 Unit(s) SubCutaneous at bedtime  insulin lispro (HumaLOG) corrective regimen sliding scale   SubCutaneous three times a day before meals  insulin lispro (HumaLOG) corrective regimen sliding scale   SubCutaneous at bedtime  insulin lispro Injectable (HumaLOG) 15 Unit(s) SubCutaneous three times a day before meals    dextrose 5%. 1000 milliLiter(s) IV Continuous <Continuous>  influenza   Vaccine 0.5 milliLiter(s) IntraMuscular once  lactated ringers. 1000 milliLiter(s) IV Continuous <Continuous>  sodium chloride 0.9%. 1000 milliLiter(s) IV Continuous <Continuous>      FAMILY HISTORY:  Family history of diabetes mellitus (Grandparent): parents, grandmother        Allergies    No Known Allergies    Intolerances    	      PHYSICAL EXAM:  T(C): 37.2 (12-26-19 @ 17:07), Max: 38.6 (12-26-19 @ 05:38)  HR: 118 (12-26-19 @ 13:29) (94 - 130)  BP: 129/67 (12-26-19 @ 13:29) (107/71 - 129/67)  RR: 18 (12-26-19 @ 13:29) (16 - 19)  SpO2: 100% (12-26-19 @ 13:29) (98% - 100%)  Wt(kg): --  I&O's Summary    25 Dec 2019 07:01  -  26 Dec 2019 07:00  --------------------------------------------------------  IN: 250 mL / OUT: 0 mL / NET: 250 mL        GENERAL: NAD  EYES: EOMI, PERRLA, conjunctiva and sclera clear  ENMT: No tonsillar erythema, exudates, or enlargement  Cardiovascular: Tachycardic Normal S1 S2, No JVD, No murmurs, No edema  Respiratory: Lungs clear to auscultation	  Gastrointestinal:  Soft, Non-tender, + BS	  Extremities: Normal range of motion, No clubbing, cyanosis or edema  LYMPH: No lymphadenopathy noted        LABS:	 	    CARDIAC MARKERS:      CKMB: 1.60 ng/mL (12-26 @ 05:55)    CKMB Relative Index: Test not performed (12-26 @ 05:55)                            14.9   11.09 )-----------( 292      ( 26 Dec 2019 05:55 )             45.6     12-26    133<L>  |  95<L>  |  12  ----------------------------<  217<H>  4.2   |  23  |  0.72    Ca    9.6      26 Dec 2019 05:55  Phos  2.2     12-26  Mg     1.7     12-26    TPro  7.6  /  Alb  3.9  /  TBili  0.6  /  DBili  x   /  AST  22  /  ALT  29  /  AlkPhos  96  12-26    proBNP:   Lipid Profile:   HgA1c:   TSH:     Consultant(s) Notes Reviewed:  [x ] YES  [ ] NO    Care Discussed with Consultants/Other Providers [ x] YES  [ ] NO    Imaging Personally Reviewed independently:  [x] YES  [ ] NO    All labs, radiologic studies, vitals, orders and medications list reviewed. Patient is seen and examined at bedside. Case discussed with medical team.    ASSESSMENT/PLAN:

## 2019-12-26 NOTE — CONSULT NOTE ADULT - ASSESSMENT
EKG ST diffuse TWI , repeat at slower rates with normal T waves     Assessment and Plan     1) Pre op eval : Asymptomatic , EKG changes likely rate related , tachycardia 2/2 to osteo, scheduled for 5th toe amp , Optimized from a cardiac standpoint for Toe amputation under local anesthesia, would get Echo before if planned for general anesthesia      2) DM: t/t per IM    3) DVT PPX heparin

## 2019-12-26 NOTE — PHYSICAL THERAPY INITIAL EVALUATION ADULT - PATIENT PROFILE REVIEW, REHAB EVAL
activity order- ambulate as tolerated with assistance ,Ok to perform PT evaluation as per RN Cristiana/yes

## 2019-12-26 NOTE — CONSULT NOTE ADULT - ASSESSMENT
33 yo man with diabetes since age 6 h/o right foot infection requiring amputation of 5th toe in 2017 now with foot infection left 5th toe.    h/o MRSA     wound culture 12/25 shows gram positive cocci    blood cultures testing    x ray suggestive of osteomyelitis      Diabetic foot infection left toe # 5 with underlying OM.  planning amputation.     Suggest:    agree with starting empiric vanco and cefepime    follow blood and wound cultures    check vanco trough prior to 4th dose      Val Cobian MD  Pager: [phone]  After 5 PM or weekends please call fellow on call or office [phone]

## 2019-12-26 NOTE — PHYSICAL THERAPY INITIAL EVALUATION ADULT - GENERAL OBSERVATIONS, REHAB EVAL
Patient received semi supine in bed , (+) dressing on left foot , in no apparent distress. Pt reported scheduled for left 5th toe surgery tomorrow.

## 2019-12-26 NOTE — PROGRESS NOTE ADULT - PROBLEM SELECTOR PLAN 1
Pt meets sepsis criteria with Fever of 101.5 and HR  > 100  Likely secondary to OM  On Vanc/Cefepime  Continue Tylenol   Will start IVF   FU Blood cultures  ID consult

## 2019-12-26 NOTE — PROGRESS NOTE ADULT - SUBJECTIVE AND OBJECTIVE BOX
Podiatry pager #: [phone] ([place])/ [phone] ([facility])    Patient is a 34y old  Male who presents with a chief complaint of Osteomyelitis (25 Dec 2019 22:54)      HPI:  33 yo M hx T1DM, HLD, recurrent foot infection presenting due to weeks of left 5th toe pain. Patient reported foot has been swelling more than usual and bleeding. He also noticed occasional numbness and tingling of the toe. No hx of amputation or other procedure on the affected foot. Patient uses tylenol and aleve for pain at home. Pain is so severe that is having difficulty walking on the toe. Of note, patient was admitted to CDU in Sept 2019 for similar complaints. Received IV abx and discharged with podiatry f/u. Prior wound culture hx of MRSA positive results. 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 1000 milliLiter(s) (50 mL/Hr) IV Continuous <Continuous>  dextrose 50% Injectable 12.5 Gram(s) IV Push once  dextrose 50% Injectable 25 Gram(s) IV Push once  dextrose 50% Injectable 25 Gram(s) IV Push once  insulin glargine Injectable (LANTUS) 25 Unit(s) SubCutaneous at bedtime  insulin lispro (HumaLOG) corrective regimen sliding scale   SubCutaneous three times a day before meals  insulin lispro (HumaLOG) corrective regimen sliding scale   SubCutaneous at bedtime  insulin lispro Injectable (HumaLOG) 15 Unit(s) SubCutaneous three times a day before meals  vancomycin  IVPB 1000 milliGRAM(s) IV Intermittent every 12 hours    MEDICATIONS  (PRN):  acetaminophen   Tablet .. 325 milliGRAM(s) Oral every 4 hours PRN Moderate Pain (4 - 6), Severe Pain (7 - 10)  dextrose 40% Gel 15 Gram(s) Oral once PRN Blood Glucose LESS THAN 70 milliGRAM(s)/deciliter  glucagon  Injectable 1 milliGRAM(s) IntraMuscular once PRN Glucose LESS THAN 70 milligrams/deciliter      Allergies    No Known Allergies    Intolerances        VITALS:    Vital Signs Last 24 Hrs  T(C): 36.7 (25 Dec 2019 23:00), Max: 36.7 (25 Dec 2019 23:00)  T(F): 98.1 (25 Dec 2019 23:00), Max: 98.1 (25 Dec 2019 23:00)  HR: 94 (25 Dec 2019 23:00) (94 - 110)  BP: 119/76 (25 Dec 2019 23:00) (119/76 - 142/91)  BP(mean): --  RR: 19 (25 Dec 2019 23:00) (16 - 19)  SpO2: 98% (25 Dec 2019 23:00) (98% - 100%)    LABS:                          14.7   13.10 )-----------( 317      ( 25 Dec 2019 18:20 )             45.1       12-25    135  |  99  |  13  ----------------------------<  188<H>  4.6   |  27  |  0.70    Ca    9.8      25 Dec 2019 18:20        CAPILLARY BLOOD GLUCOSE      POCT Blood Glucose.: 98 mg/dL (25 Dec 2019 22:39)  POCT Blood Glucose.: 202 mg/dL (25 Dec 2019 17:01)          LOWER EXTREMITY PHYSICAL EXAM:    Vascular: DP/PT 1/4 R, non palpable L, CFT <3 seconds B/L- except for L 5th digit which was sluggish, Temperature gradient warm to cool B/L  Neuro: Epicritic sensation diminished to the level of toes B/L  Musculoskeletal/Ortho: R foot 5th digit amp  Skin: L foot: dusky 5th digit, 5th digit fibrogranular wound bed down to bone, scant purulence, no malodor, no fluctuance, no crepitus, diffuse edema throughout L foot      RADIOLOGY & ADDITIONAL STUDIES:    < from: Xray Toes, Left Foot (12.25.19 @ 18:40) >  ******PRELIMINARY REPORT******    ******PRELIMINARY REPORT******            EXAM:  RAD TOE(S) LEFT FOOT        PROCEDURE DATE:  Dec 25 2019     ******PRELIMINARY REPORT******    ******PRELIMINARY REPORT******            INTERPRETATION:  cortical erosion along the lateral aspect of the mid 5th phalanx may represent osteomyelitis            ******PRELIMINARY REPORT******    ******PRELIMINARY REPORT******          DARYN LEIVA M.D., RADIOLOGY RESIDENT    < end of copied text >

## 2019-12-26 NOTE — PROGRESS NOTE ADULT - PROBLEM SELECTOR PLAN 2
Xray left foot showing cortical erosion along the lateral aspect of the mid 5th phalanx may represent OM   started on vanc and cefepime, will continue for now. Monitor Vanc trough   ESR- 28, CRP- 32  Wound cultures- GPC in cocci   Prior wound culture hx of MRSA positive results.  Appreciate  pod recs- FU EDDI/PVR ,if abnormal read rec vasc sx c/s  As per Podiatry  "Pt tentatively booked for left foot 5th digit amputation tomorrow, 1:00pm pending patient decision.  Discussed risks vs benefits of amputation vs. conservative treatment to include use of long term abx via PICC with the associated risks of renal damage, resistance, PICC line infection, etc.  Patient understands and will decide within the next 24h"  continue local wound care  WBAT in sx shoe to the left foot

## 2019-12-26 NOTE — CHART NOTE - NSCHARTNOTEFT_GEN_A_CORE
Notified by RN pt febrile and tachycardic. Pt seen and evaluated at bedside. He reports a mild headache but denies chest pain, palpitations, SOB or any other complaints at this time.     -IV tylenol   -EKG- with T-wave inversions f/u repeat after HR improvement   -continue abx   -F/u bcx

## 2019-12-26 NOTE — PROGRESS NOTE ADULT - PROBLEM SELECTOR PLAN 3
BS  in 160-230s  On  lantus 25u and premeal 15u while inpatient. (uses 35u lantus and 24u premeals at home)  Monitor BS   Uptitrate Insulin as per needs

## 2019-12-26 NOTE — CONSULT NOTE ADULT - SUBJECTIVE AND OBJECTIVE BOX
HPI:  35 yo M hx T1DM diagnosed age 6,  with recurrent foot infection presenting due to weeks of left 5th toe pain. Patient reported foot has been swelling more than usual and bleeding. He also noticed occasional numbness and tingling of the toe.  Patient reports began in September; hospitalized [facility] given IV antibiotics then oral. He took another course of outpatient antibiotics as well.  Works in [employer]; stands for long duration.   Fever prompted return to ER.    h/o amputation 5th toe right foot 2017.      PAST MEDICAL & SURGICAL HISTORY:  Diabetic retinopathy  Diabetes: ~ diagnosed at 7 years of age  History of appendectomy  H/O eye surgery: ~ left, due to Retinopathy  H/O amputation of lesser toe, right: ~ 5th      Allergies    No Known Allergies    Intolerances        ANTIMICROBIALS:  cefepime   IVPB 1000 every 8 hours  vancomycin  IVPB 1000 every 8 hours      OTHER MEDS:  acetaminophen   Tablet .. 325 milliGRAM(s) Oral every 4 hours PRN  dextrose 40% Gel 15 Gram(s) Oral once PRN  dextrose 5%. 1000 milliLiter(s) IV Continuous <Continuous>  dextrose 50% Injectable 12.5 Gram(s) IV Push once  dextrose 50% Injectable 25 Gram(s) IV Push once  dextrose 50% Injectable 25 Gram(s) IV Push once  glucagon  Injectable 1 milliGRAM(s) IntraMuscular once PRN  heparin  Injectable 5000 Unit(s) SubCutaneous two times a day  influenza   Vaccine 0.5 milliLiter(s) IntraMuscular once  insulin glargine Injectable (LANTUS) 25 Unit(s) SubCutaneous at bedtime  insulin lispro (HumaLOG) corrective regimen sliding scale   SubCutaneous three times a day before meals  insulin lispro (HumaLOG) corrective regimen sliding scale   SubCutaneous at bedtime  insulin lispro Injectable (HumaLOG) 15 Unit(s) SubCutaneous three times a day before meals  lactated ringers. 1000 milliLiter(s) IV Continuous <Continuous>  sodium chloride 0.9%. 1000 milliLiter(s) IV Continuous <Continuous>      SOCIAL HISTORY: works in [employer]    FAMILY HISTORY:  Family history of diabetes mellitus (Grandparent): parents, grandmother      REVIEW OF SYSTEMS  [  ] ROS unobtainable because:    [x  ] All other systems negative except as noted below:	    Constitutional:  [ ] fever [ ] chills  [ ] weight loss  [ ] weakness  Skin:  [ ] rash [ ] phlebitis	  Eyes: [ ] icterus [ ] pain  [ ] discharge	  ENMT: [ ] sore throat  [ ] thrush [ ] ulcers [ ] exudates  Respiratory: [ ] dyspnea [ ] hemoptysis [ ] cough [ ] sputum	  Cardiovascular:  [ ] chest pain [ ] palpitations [ ] edema	  Gastrointestinal:  [ ] nausea [ ] vomiting [ ] diarrhea [ ] constipation [ ] pain	  Genitourinary:  [ ] dysuria [ ] frequency [ ] hematuria [ ] discharge [ ] flank pain  [ ] incontinence  Musculoskeletal:  [ ] myalgias [ ] arthralgias [ ] arthritis  [ ] back pain  left toe pain  Neurological:  [ ] headache [ ] seizures  [ ] confusion/altered mental status  Psychiatric:  [ ] anxiety [ ] depression	  Hematology/Lymphatics:  [ ] lymphadenopathy  Endocrine:  [ ] adrenal [ ] thyroid  Allergic/Immunologic:	 [ ] transplant [ ] seasonal    PHYSICAL EXAM:  General: [x ] non-toxic  HEAD/EYES: [ ] PERRL [x ] white sclera [ ] icterus  ENT:  [ ] normal [x ] supple [ ] thrush [ ] pharyngeal exudate  Cardiovascular:   [ ] murmur [x ] normal [ ] PPM/AICD  Respiratory:  [x ] clear to ausculation bilaterally  GI:  [x ] soft, non-tender, normal bowel sounds  :  [ ] méndez [x ] no CVA tenderness   Musculoskeletal:  1 cm ulcer lateral left 5th toe, toe dusky. right foot s/p amp toe #5 well healed  Neurologic:  [x ] non-focal exam   Skin:  [x ] no rash  Lymph: [ ] no lymphadenopathy  Psychiatric:  x[ ] appropriate affect x[ ] alert & oriented  Lines:  [ x] no phlebitis [ ] central line          Drug Dosing Weight  Height (cm): 175.3 (26 Dec 2019 00:05)  Weight (kg): 98.6 (26 Dec 2019 00:05)  BMI (kg/m2): 32.1 (26 Dec 2019 00:05)  BSA (m2): 2.14 (26 Dec 2019 00:05)    Vital Signs Last 24 Hrs  T(F): 98.9 (12-26-19 @ 17:07), Max: 101.5 (12-26-19 @ 05:38)    Vital Signs Last 24 Hrs  HR: 118 (12-26-19 @ 13:29) (94 - 130)  BP: 129/67 (12-26-19 @ 13:29) (107/71 - 129/67)  RR: 18 (12-26-19 @ 13:29)  SpO2: 100% (12-26-19 @ 13:29) (98% - 100%)  Wt(kg): --                          14.9   11.09 )-----------( 292      ( 26 Dec 2019 05:55 )             45.6       12-26    133<L>  |  95<L>  |  12  ----------------------------<  217<H>  4.2   |  23  |  0.72    Ca    9.6      26 Dec 2019 05:55  Phos  2.2     12-26  Mg     1.7     12-26    TPro  7.6  /  Alb  3.9  /  TBili  0.6  /  DBili  x   /  AST  22  /  ALT  29  /  AlkPhos  96  12-26          MICROBIOLOGY:  OTHER  12-25-19 --  --  --    Culture - Abscess with Gram Stain (12.25.19 @ 22:06)    Specimen Source: OTHER    Gram Stain Result:   WBC^White Blood Cells  QNTY CELLS IN GRAM STAIN: FEW (2+)  GPC^Gram pos. cocci        RADIOLOGY:    < from: Xray Chest 1 View AP/PA (12.26.19 @ 15:44) >    Impression:    Normal AP chest film    < end of copied text >  < from: Xray Toes, Left Foot (12.25.19 @ 18:40) >      INTERPRETATION:  CLINICAL INFORMATION: Patient with diabetes complaining of pain/bleeding from chronic ulcer at the base of the left fifth digit along the lateral aspect.    TECHNIQUE: 2 radiographic views of the left foot.    COMPARISON: Comparison is made with radiographs of the left foot from 9/21/2019.    FINDINGS:     No acute fracture or dislocation.  There is an ulcer overlying the fifth toe and suspected cortical erosion on the lateral aspect of the middle phalanx of this digit suggesting osteomyelitis. No subcutaneous air identified.    MRI would be more sensitive.  The joint spaces are maintained.  No radiopaque foreign body or abnormal osseous mineralization.    IMPRESSION: Possible osteomyelitis middle phalanx fifth toe.    < end of copied text >

## 2019-12-26 NOTE — PROVIDER CONTACT NOTE (OTHER) - ASSESSMENT
Pt is AOx4, skin is warm. Pt is febrile and tachycardic but BP is stable. MEWS score is 7 Pt is AOx4, skin is warm. Pt is febrile and tachycardic but BP is stable. MEWS score is 7, no signs of distress

## 2019-12-27 ENCOUNTER — TRANSCRIPTION ENCOUNTER (OUTPATIENT)
Age: 34
End: 2019-12-27

## 2019-12-27 ENCOUNTER — RESULT REVIEW (OUTPATIENT)
Age: 34
End: 2019-12-27

## 2019-12-27 LAB
ANION GAP SERPL CALC-SCNC: 12 MMO/L — SIGNIFICANT CHANGE UP (ref 7–14)
ANION GAP SERPL CALC-SCNC: 12 MMO/L — SIGNIFICANT CHANGE UP (ref 7–14)
APTT BLD: 30.5 SEC — SIGNIFICANT CHANGE UP (ref 27.5–36.3)
BASOPHILS # BLD AUTO: 0.05 K/UL — SIGNIFICANT CHANGE UP (ref 0–0.2)
BASOPHILS NFR BLD AUTO: 0.5 % — SIGNIFICANT CHANGE UP (ref 0–2)
BLD GP AB SCN SERPL QL: NEGATIVE — SIGNIFICANT CHANGE UP
BUN SERPL-MCNC: 14 MG/DL — SIGNIFICANT CHANGE UP (ref 7–23)
BUN SERPL-MCNC: 14 MG/DL — SIGNIFICANT CHANGE UP (ref 7–23)
CALCIUM SERPL-MCNC: 9.3 MG/DL — SIGNIFICANT CHANGE UP (ref 8.4–10.5)
CALCIUM SERPL-MCNC: 9.3 MG/DL — SIGNIFICANT CHANGE UP (ref 8.4–10.5)
CHLORIDE SERPL-SCNC: 97 MMOL/L — LOW (ref 98–107)
CHLORIDE SERPL-SCNC: 97 MMOL/L — LOW (ref 98–107)
CO2 SERPL-SCNC: 25 MMOL/L — SIGNIFICANT CHANGE UP (ref 22–31)
CO2 SERPL-SCNC: 25 MMOL/L — SIGNIFICANT CHANGE UP (ref 22–31)
CREAT SERPL-MCNC: 0.83 MG/DL — SIGNIFICANT CHANGE UP (ref 0.5–1.3)
CREAT SERPL-MCNC: 0.83 MG/DL — SIGNIFICANT CHANGE UP (ref 0.5–1.3)
EOSINOPHIL # BLD AUTO: 0.11 K/UL — SIGNIFICANT CHANGE UP (ref 0–0.5)
EOSINOPHIL NFR BLD AUTO: 1.2 % — SIGNIFICANT CHANGE UP (ref 0–6)
GLUCOSE BLDC GLUCOMTR-MCNC: 238 MG/DL — HIGH (ref 70–99)
GLUCOSE BLDC GLUCOMTR-MCNC: 240 MG/DL — HIGH (ref 70–99)
GLUCOSE BLDC GLUCOMTR-MCNC: 252 MG/DL — HIGH (ref 70–99)
GLUCOSE BLDC GLUCOMTR-MCNC: 255 MG/DL — HIGH (ref 70–99)
GLUCOSE BLDC GLUCOMTR-MCNC: 266 MG/DL — HIGH (ref 70–99)
GLUCOSE SERPL-MCNC: 262 MG/DL — HIGH (ref 70–99)
GLUCOSE SERPL-MCNC: 262 MG/DL — HIGH (ref 70–99)
GRAM STN WND: SIGNIFICANT CHANGE UP
HCT VFR BLD CALC: 44.3 % — SIGNIFICANT CHANGE UP (ref 39–50)
HCT VFR BLD CALC: 44.3 % — SIGNIFICANT CHANGE UP (ref 39–50)
HGB BLD-MCNC: 14.3 G/DL — SIGNIFICANT CHANGE UP (ref 13–17)
HGB BLD-MCNC: 14.3 G/DL — SIGNIFICANT CHANGE UP (ref 13–17)
IMM GRANULOCYTES NFR BLD AUTO: 0.3 % — SIGNIFICANT CHANGE UP (ref 0–1.5)
INR BLD: 1.08 — SIGNIFICANT CHANGE UP (ref 0.88–1.17)
LYMPHOCYTES # BLD AUTO: 2.48 K/UL — SIGNIFICANT CHANGE UP (ref 1–3.3)
LYMPHOCYTES # BLD AUTO: 26.6 % — SIGNIFICANT CHANGE UP (ref 13–44)
MAGNESIUM SERPL-MCNC: 1.8 MG/DL — SIGNIFICANT CHANGE UP (ref 1.6–2.6)
MCHC RBC-ENTMCNC: 28.2 PG — SIGNIFICANT CHANGE UP (ref 27–34)
MCHC RBC-ENTMCNC: 28.2 PG — SIGNIFICANT CHANGE UP (ref 27–34)
MCHC RBC-ENTMCNC: 32.3 % — SIGNIFICANT CHANGE UP (ref 32–36)
MCHC RBC-ENTMCNC: 32.3 % — SIGNIFICANT CHANGE UP (ref 32–36)
MCV RBC AUTO: 87.4 FL — SIGNIFICANT CHANGE UP (ref 80–100)
MCV RBC AUTO: 87.4 FL — SIGNIFICANT CHANGE UP (ref 80–100)
MONOCYTES # BLD AUTO: 0.93 K/UL — HIGH (ref 0–0.9)
MONOCYTES NFR BLD AUTO: 10 % — SIGNIFICANT CHANGE UP (ref 2–14)
NEUTROPHILS # BLD AUTO: 5.73 K/UL — SIGNIFICANT CHANGE UP (ref 1.8–7.4)
NEUTROPHILS NFR BLD AUTO: 61.4 % — SIGNIFICANT CHANGE UP (ref 43–77)
NRBC # FLD: 0 K/UL — SIGNIFICANT CHANGE UP (ref 0–0)
NRBC # FLD: 0 K/UL — SIGNIFICANT CHANGE UP (ref 0–0)
PHOSPHATE SERPL-MCNC: 2.7 MG/DL — SIGNIFICANT CHANGE UP (ref 2.5–4.5)
PLATELET # BLD AUTO: 281 K/UL — SIGNIFICANT CHANGE UP (ref 150–400)
PLATELET # BLD AUTO: 281 K/UL — SIGNIFICANT CHANGE UP (ref 150–400)
PMV BLD: 10.1 FL — SIGNIFICANT CHANGE UP (ref 7–13)
PMV BLD: 10.1 FL — SIGNIFICANT CHANGE UP (ref 7–13)
POTASSIUM SERPL-MCNC: 4.3 MMOL/L — SIGNIFICANT CHANGE UP (ref 3.5–5.3)
POTASSIUM SERPL-MCNC: 4.3 MMOL/L — SIGNIFICANT CHANGE UP (ref 3.5–5.3)
POTASSIUM SERPL-SCNC: 4.3 MMOL/L — SIGNIFICANT CHANGE UP (ref 3.5–5.3)
POTASSIUM SERPL-SCNC: 4.3 MMOL/L — SIGNIFICANT CHANGE UP (ref 3.5–5.3)
PROTHROM AB SERPL-ACNC: 12.4 SEC — SIGNIFICANT CHANGE UP (ref 9.8–13.1)
RBC # BLD: 5.07 M/UL — SIGNIFICANT CHANGE UP (ref 4.2–5.8)
RBC # BLD: 5.07 M/UL — SIGNIFICANT CHANGE UP (ref 4.2–5.8)
RBC # FLD: 12.8 % — SIGNIFICANT CHANGE UP (ref 10.3–14.5)
RBC # FLD: 12.8 % — SIGNIFICANT CHANGE UP (ref 10.3–14.5)
RH IG SCN BLD-IMP: POSITIVE — SIGNIFICANT CHANGE UP
SODIUM SERPL-SCNC: 134 MMOL/L — LOW (ref 135–145)
SODIUM SERPL-SCNC: 134 MMOL/L — LOW (ref 135–145)
SPECIMEN SOURCE: SIGNIFICANT CHANGE UP
VANCOMYCIN TROUGH SERPL-MCNC: 10.9 UG/ML — SIGNIFICANT CHANGE UP (ref 10–20)
WBC # BLD: 9.33 K/UL — SIGNIFICANT CHANGE UP (ref 3.8–10.5)
WBC # BLD: 9.33 K/UL — SIGNIFICANT CHANGE UP (ref 3.8–10.5)
WBC # FLD AUTO: 9.33 K/UL — SIGNIFICANT CHANGE UP (ref 3.8–10.5)
WBC # FLD AUTO: 9.33 K/UL — SIGNIFICANT CHANGE UP (ref 3.8–10.5)

## 2019-12-27 PROCEDURE — 99233 SBSQ HOSP IP/OBS HIGH 50: CPT

## 2019-12-27 PROCEDURE — 88305 TISSUE EXAM BY PATHOLOGIST: CPT | Mod: 26

## 2019-12-27 PROCEDURE — 73630 X-RAY EXAM OF FOOT: CPT | Mod: 26,LT

## 2019-12-27 PROCEDURE — 99232 SBSQ HOSP IP/OBS MODERATE 35: CPT

## 2019-12-27 PROCEDURE — 88311 DECALCIFY TISSUE: CPT | Mod: 26

## 2019-12-27 RX ADMIN — SODIUM CHLORIDE 50 MILLILITER(S): 9 INJECTION INTRAMUSCULAR; INTRAVENOUS; SUBCUTANEOUS at 00:10

## 2019-12-27 RX ADMIN — HEPARIN SODIUM 5000 UNIT(S): 5000 INJECTION INTRAVENOUS; SUBCUTANEOUS at 18:58

## 2019-12-27 RX ADMIN — CEFEPIME 100 MILLIGRAM(S): 1 INJECTION, POWDER, FOR SOLUTION INTRAMUSCULAR; INTRAVENOUS at 18:58

## 2019-12-27 RX ADMIN — Medication 3: at 19:50

## 2019-12-27 RX ADMIN — Medication 15 UNIT(S): at 19:49

## 2019-12-27 RX ADMIN — HEPARIN SODIUM 5000 UNIT(S): 5000 INJECTION INTRAVENOUS; SUBCUTANEOUS at 06:22

## 2019-12-27 RX ADMIN — CEFEPIME 100 MILLIGRAM(S): 1 INJECTION, POWDER, FOR SOLUTION INTRAMUSCULAR; INTRAVENOUS at 06:21

## 2019-12-27 RX ADMIN — Medication 250 MILLIGRAM(S): at 07:28

## 2019-12-27 RX ADMIN — INSULIN GLARGINE 25 UNIT(S): 100 INJECTION, SOLUTION SUBCUTANEOUS at 21:54

## 2019-12-27 RX ADMIN — Medication 250 MILLIGRAM(S): at 17:35

## 2019-12-27 NOTE — PROGRESS NOTE ADULT - SUBJECTIVE AND OBJECTIVE BOX
PROGRESS NOTE:     Patient is a 34y old  Male who presents with a chief complaint of Osteomyelitis (26 Dec 2019 10:10)      SUBJECTIVE / OVERNIGHT EVENTS:  Tmax of 101.3 yesterday afternoon, afebrile overnight  this AM   Reports mild pain on left foot on walking   Awaiting toe amputation by podiatry     ADDITIONAL REVIEW OF SYSTEMS:    MEDICATIONS  (STANDING):  cefepime   IVPB 1000 milliGRAM(s) IV Intermittent every 8 hours  dextrose 5%. 1000 milliLiter(s) (50 mL/Hr) IV Continuous <Continuous>  dextrose 50% Injectable 12.5 Gram(s) IV Push once  dextrose 50% Injectable 25 Gram(s) IV Push once  dextrose 50% Injectable 25 Gram(s) IV Push once  heparin  Injectable 5000 Unit(s) SubCutaneous two times a day  influenza   Vaccine 0.5 milliLiter(s) IntraMuscular once  insulin glargine Injectable (LANTUS) 25 Unit(s) SubCutaneous at bedtime  insulin lispro (HumaLOG) corrective regimen sliding scale   SubCutaneous three times a day before meals  insulin lispro (HumaLOG) corrective regimen sliding scale   SubCutaneous at bedtime  insulin lispro Injectable (HumaLOG) 15 Unit(s) SubCutaneous three times a day before meals  sodium chloride 0.9%. 1000 milliLiter(s) (50 mL/Hr) IV Continuous <Continuous>  vancomycin  IVPB 1000 milliGRAM(s) IV Intermittent every 8 hours    MEDICATIONS  (PRN):  acetaminophen   Tablet .. 325 milliGRAM(s) Oral every 4 hours PRN Moderate Pain (4 - 6), Severe Pain (7 - 10)  dextrose 40% Gel 15 Gram(s) Oral once PRN Blood Glucose LESS THAN 70 milliGRAM(s)/deciliter  glucagon  Injectable 1 milliGRAM(s) IntraMuscular once PRN Glucose LESS THAN 70 milligrams/deciliter      CAPILLARY BLOOD GLUCOSE  POCT Blood Glucose.: 240 mg/dL (27 Dec 2019 07:24)  POCT Blood Glucose.: 239 mg/dL (26 Dec 2019 21:40)  POCT Blood Glucose.: 252 mg/dL (26 Dec 2019 16:30)  POCT Blood Glucose.: 196 mg/dL (26 Dec 2019 11:14)    I&O's Summary    25 Dec 2019 07:01  -  26 Dec 2019 07:00  --------------------------------------------------------  IN: 250 mL / OUT: 0 mL / NET: 250 mL        PHYSICAL EXAM:  Vital Signs Last 24 Hrs  T(C): 37.1 (27 Dec 2019 06:15), Max: 38.5 (26 Dec 2019 13:29)  T(F): 98.8 (27 Dec 2019 06:15), Max: 101.3 (26 Dec 2019 13:29)  HR: 102 (27 Dec 2019 06:15) (96 - 118)  BP: 120/76 (27 Dec 2019 06:15) (120/76 - 145/77)  BP(mean): --  RR: 17 (27 Dec 2019 06:15) (17 - 19)  SpO2: 98% (27 Dec 2019 06:15) (98% - 100%)      CONSTITUTIONAL: NAD, well-developed  RESPIRATORY: Normal respiratory effort; lungs are clear to auscultation bilaterally  CARDIOVASCULAR: Regular rate and rhythm, normal S1 and S2, no murmur/rub/gallop; No lower extremity edema;   ABDOMEN: Nontender to palpation, normoactive bowel sounds, no rebound/guarding; No hepatosplenomegaly  MUSCLOSKELETAL: no clubbing or cyanosis of digits; no joint swelling or tenderness to palpation  PSYCH: A+O to person, place, and time; affect appropriate  NEURO:AAOx 3   SKIN: R foot 5th digit  s/p amp  5th left toes- presence of dusky erythema with minimal drainage, 5mm wound with eschar, mild tenderness   Left foot DP/PT - not palpable. Right foot pulses palpable       LABS:                                            14.3   9.33  )-----------( 281      ( 27 Dec 2019 06:00 )             44.3         12-27    134<L>  |  97<L>  |  14  ----------------------------<  262<H>  4.3   |  25  |  0.83    Ca    9.3      27 Dec 2019 06:00  Phos  2.7     12-27  Mg     1.8     12-27    TPro  7.6  /  Alb  3.9  /  TBili  0.6  /  DBili  x   /  AST  22  /  ALT  29  /  AlkPhos  96  12-26        Culture - Abscess with Gram Stain (collected 25 Dec 2019 22:06)  Source: OTHER        RADIOLOGY & ADDITIONAL TESTS:  Results Reviewed:   Imaging Personally Reviewed:  Electrocardiogram Personally Reviewed:    COORDINATION OF CARE:  Care Discussed with Consultants/Other Providers [Y/N]:  Prior or Outpatient Records Reviewed [Y/N]: PROGRESS NOTE:     Patient is a 34y old  Male who presents with a chief complaint of Osteomyelitis (26 Dec 2019 10:10)      SUBJECTIVE / OVERNIGHT EVENTS:  Tmax of 101.3 yesterday afternoon, afebrile overnight  this AM   Awaiting toe amputation by podiatry     ADDITIONAL REVIEW OF SYSTEMS:    MEDICATIONS  (STANDING):  cefepime   IVPB 1000 milliGRAM(s) IV Intermittent every 8 hours  dextrose 5%. 1000 milliLiter(s) (50 mL/Hr) IV Continuous <Continuous>  dextrose 50% Injectable 12.5 Gram(s) IV Push once  dextrose 50% Injectable 25 Gram(s) IV Push once  dextrose 50% Injectable 25 Gram(s) IV Push once  heparin  Injectable 5000 Unit(s) SubCutaneous two times a day  influenza   Vaccine 0.5 milliLiter(s) IntraMuscular once  insulin glargine Injectable (LANTUS) 25 Unit(s) SubCutaneous at bedtime  insulin lispro (HumaLOG) corrective regimen sliding scale   SubCutaneous three times a day before meals  insulin lispro (HumaLOG) corrective regimen sliding scale   SubCutaneous at bedtime  insulin lispro Injectable (HumaLOG) 15 Unit(s) SubCutaneous three times a day before meals  sodium chloride 0.9%. 1000 milliLiter(s) (50 mL/Hr) IV Continuous <Continuous>  vancomycin  IVPB 1000 milliGRAM(s) IV Intermittent every 8 hours    MEDICATIONS  (PRN):  acetaminophen   Tablet .. 325 milliGRAM(s) Oral every 4 hours PRN Moderate Pain (4 - 6), Severe Pain (7 - 10)  dextrose 40% Gel 15 Gram(s) Oral once PRN Blood Glucose LESS THAN 70 milliGRAM(s)/deciliter  glucagon  Injectable 1 milliGRAM(s) IntraMuscular once PRN Glucose LESS THAN 70 milligrams/deciliter      CAPILLARY BLOOD GLUCOSE  POCT Blood Glucose.: 240 mg/dL (27 Dec 2019 07:24)  POCT Blood Glucose.: 239 mg/dL (26 Dec 2019 21:40)  POCT Blood Glucose.: 252 mg/dL (26 Dec 2019 16:30)  POCT Blood Glucose.: 196 mg/dL (26 Dec 2019 11:14)    I&O's Summary    25 Dec 2019 07:01  -  26 Dec 2019 07:00  --------------------------------------------------------  IN: 250 mL / OUT: 0 mL / NET: 250 mL        PHYSICAL EXAM:  Vital Signs Last 24 Hrs  T(C): 37.1 (27 Dec 2019 06:15), Max: 38.5 (26 Dec 2019 13:29)  T(F): 98.8 (27 Dec 2019 06:15), Max: 101.3 (26 Dec 2019 13:29)  HR: 102 (27 Dec 2019 06:15) (96 - 118)  BP: 120/76 (27 Dec 2019 06:15) (120/76 - 145/77)  BP(mean): --  RR: 17 (27 Dec 2019 06:15) (17 - 19)  SpO2: 98% (27 Dec 2019 06:15) (98% - 100%)      CONSTITUTIONAL: NAD, well-developed  RESPIRATORY: Normal respiratory effort; lungs are clear to auscultation bilaterally  CARDIOVASCULAR: Regular rate and rhythm, normal S1 and S2, no murmur/rub/gallop; No lower extremity edema;   ABDOMEN: Nontender to palpation, normoactive bowel sounds, no rebound/guarding; No hepatosplenomegaly  MUSCLOSKELETAL: no clubbing or cyanosis of digits; no joint swelling or tenderness to palpation  PSYCH: A+O to person, place, and time; affect appropriate  NEURO:AAOx 3   SKIN: R foot 5th digit  s/p amp  5th left toes- presence of dusky erythema with minimal drainage, 5mm wound with eschar, mild tenderness   Left foot DP/PT - not palpable. Right foot pulses palpable       LABS:                                            14.3   9.33  )-----------( 281      ( 27 Dec 2019 06:00 )             44.3         12-27    134<L>  |  97<L>  |  14  ----------------------------<  262<H>  4.3   |  25  |  0.83    Ca    9.3      27 Dec 2019 06:00  Phos  2.7     12-27  Mg     1.8     12-27    TPro  7.6  /  Alb  3.9  /  TBili  0.6  /  DBili  x   /  AST  22  /  ALT  29  /  AlkPhos  96  12-26        Culture - Abscess with Gram Stain (collected 25 Dec 2019 22:06)  Source: OTHER        RADIOLOGY & ADDITIONAL TESTS:  Results Reviewed:   Imaging Personally Reviewed:  Electrocardiogram Personally Reviewed:    COORDINATION OF CARE:  Care Discussed with Consultants/Other Providers [Y/N]:  Prior or Outpatient Records Reviewed [Y/N]:

## 2019-12-27 NOTE — DISCHARGE NOTE PROVIDER - NSDCMRMEDTOKEN_GEN_ALL_CORE_FT
acetaminophen 325 mg oral tablet: 2 tab(s) orally every 6 hours, As needed, Mild Pain (1 - 3)  HumaLOG KwikPen 100 units/mL subcutaneous solution: 18 units subcutaneous before breakfast, 14 units subcutaneously before lunch and 16  units subcutaneously before dinner  lactobacillus acidophilus oral capsule: 1 tab(s) orally once a day  Lantus Solostar Pen 100 units/mL subcutaneous solution: 46 unit(s) subcutaneous once a day (at bedtime) acetaminophen 325 mg oral tablet: 2 tab(s) orally every 6 hours, As needed, Mild Pain (1 - 3)  Admelog 100 units/mL injectable solution: 22 unit(s) injectable 3 times a day (before meals)  atorvastatin 20 mg oral tablet: 1 tab(s) orally once a day (at bedtime)  Basaglar KwikPen 100 units/mL subcutaneous solution: 40 unit(s) subcutaneous once a day (at bedtime)  lactobacillus acidophilus oral capsule: 1 tab(s) orally once a day  Lantus Solostar Pen 100 units/mL subcutaneous solution: 46 unit(s) subcutaneous once a day (at bedtime) acetaminophen 325 mg oral tablet: 2 tab(s) orally every 6 hours, As needed, Mild Pain (1 - 3)  Admelog 100 units/mL injectable solution: 28 unit(s) injectable 3 times a day (before meals)  atorvastatin 40 mg oral tablet: 1 tab(s) orally once a day  Basaglar KwikPen 100 units/mL subcutaneous solution: 48 unit(s) subcutaneous once a day (at bedtime)  Keflex 500 mg oral capsule: 1 cap(s) orally every 6 hours   lactobacillus acidophilus oral capsule: 1 tab(s) orally once a day  Lantus Solostar Pen 100 units/mL subcutaneous solution: 46 unit(s) subcutaneous once a day (at bedtime)  Outpatient physical therapy: Outpatient physical therapy  2-3 times per week acetaminophen 325 mg oral tablet: 2 tab(s) orally every 6 hours, As needed, Mild Pain (1 - 3)  Admelog 100 units/mL injectable solution: 28 unit(s) injectable 3 times a day (before meals)  atorvastatin 40 mg oral tablet: 1 tab(s) orally once a day  Basaglar KwikPen 100 units/mL subcutaneous solution: 48 unit(s) subcutaneous once a day (at bedtime)  Keflex 500 mg oral capsule: 1 cap(s) orally every 6 hours   lactobacillus acidophilus oral capsule: 1 tab(s) orally once a day  Outpatient physical therapy: Outpatient physical therapy  2-3 times per week

## 2019-12-27 NOTE — DISCHARGE NOTE PROVIDER - PROVIDER TOKENS
FREE:[LAST:[Endocrine Follow Up],PHONE:[(   )    -],FAX:[(   )    -],ADDRESS:[-[facility] with [facility], [address] [place] [address], [address], [facility] [address], [phone]   *Patient to call to schedule appt, please ask for next available CDE/MD appointment after discharge.]],FREE:[LAST:[[facility]],PHONE:[phone]],FAX:[(   )    -],ADDRESS:[PODIATRY FOLLOW UP]] FREE:[LAST:[Endocrine Follow Up],PHONE:[(   )    -],FAX:[(   )    -],ADDRESS:[-Follow up with [facility], [address] [place] [address], [address], [facility] [address], [phone]   *Patient to call to schedule appt, please ask for next available CDE/MD appointment after discharge.]],FREE:[LAST:[Mirza],PHONE:[phone]],FAX:[(   )    -],ADDRESS:[PODIATRY FOLLOW UP]],PROVIDER:[TOKEN:[[id number]]]

## 2019-12-27 NOTE — PROGRESS NOTE ADULT - SUBJECTIVE AND OBJECTIVE BOX
Follow Up:  OM right toe # 5    Inverval History/ROS: s/p toe amputation.  feels OK- seen in PACU.    Allergies  No Known Allergies        ANTIMICROBIALS:  cefepime   IVPB 1000 every 8 hours  vancomycin  IVPB 1000 every 8 hours      OTHER MEDS:  acetaminophen   Tablet .. 325 milliGRAM(s) Oral every 4 hours PRN  dextrose 40% Gel 15 Gram(s) Oral once PRN  dextrose 5%. 1000 milliLiter(s) IV Continuous <Continuous>  dextrose 50% Injectable 12.5 Gram(s) IV Push once  dextrose 50% Injectable 25 Gram(s) IV Push once  dextrose 50% Injectable 25 Gram(s) IV Push once  glucagon  Injectable 1 milliGRAM(s) IntraMuscular once PRN  heparin  Injectable 5000 Unit(s) SubCutaneous two times a day  influenza   Vaccine 0.5 milliLiter(s) IntraMuscular once  insulin glargine Injectable (LANTUS) 25 Unit(s) SubCutaneous at bedtime  insulin lispro (HumaLOG) corrective regimen sliding scale   SubCutaneous three times a day before meals  insulin lispro (HumaLOG) corrective regimen sliding scale   SubCutaneous at bedtime  insulin lispro Injectable (HumaLOG) 15 Unit(s) SubCutaneous three times a day before meals  sodium chloride 0.9%. 1000 milliLiter(s) IV Continuous <Continuous>      Vital Signs Last 24 Hrs  T(C): 36.9 (27 Dec 2019 11:49), Max: 37.4 (26 Dec 2019 23:11)  T(F): 98.5 (27 Dec 2019 11:49), Max: 99.3 (26 Dec 2019 23:11)  HR: 92 (27 Dec 2019 17:00) (85 - 115)  BP: 123/74 (27 Dec 2019 17:00) (118/62 - 145/78)  BP(mean): 86 (27 Dec 2019 17:00) (83 - 94)  RR: 17 (27 Dec 2019 17:00) (13 - 24)  SpO2: 97% (27 Dec 2019 17:00) (95% - 100%)    PHYSICAL EXAM:  General:  non-toxic  HEAD/EYES:  white sclera   ENT:   supple   Cardiovascular: S1S2  Respiratory:   clear to ausculation bilaterally  GI:  [soft, non-tender, normal bowel sounds  : no méndez    Musculoskeletal: dressing left foot  Neurologic:   non-focal exam   Skin:   no rash  Psychiatric:  [x ] appropriate affect [x ] alert & oriented  Lines:  no phlebitis                                 14.3   9.33  )-----------( 281      ( 27 Dec 2019 06:00 )             44.3       12-27    134<L>  |  97<L>  |  14  ----------------------------<  262<H>  4.3   |  25  |  0.83    Ca    9.3      27 Dec 2019 06:00  Phos  2.7     12-27  Mg     1.8     12-27    TPro  7.6  /  Alb  3.9  /  TBili  0.6  /  DBili  x   /  AST  22  /  ALT  29  /  AlkPhos  96  12-26          MICROBIOLOGY:  Vancomycin Level, Trough: 10.9 ug/mL (12-27-19 @ 06:00)  v  BLOOD  12-25-19 --  --  --  Culture - Blood (12.25.19 @ 22:46)    Culture - Blood:   NO ORGANISMS ISOLATED  NO ORGANISMS ISOLATED AT 24 HOURS    Specimen Source: BLOOD PERIPHERAL    Culture - Blood (12.25.19 @ 22:46)    Culture - Blood:   NO ORGANISMS ISOLATED  NO ORGANISMS ISOLATED AT 24 HOURS    Specimen Source: BLOOD        OTHER  12-25-19   MANY  Routine susceptibility testing not performed as  Streptococcus Grp A/B is susceptible to drug(s) of choice  (Penicillin and Ampicillin).  STRPY^Streptococcus pyogenes (Gp A)  STCN^Staphylococcus sp.,coag neg  --  --      RADIOLOGY:    < from: Xray Foot AP + Lateral + Oblique, Left (12.27.19 @ 14:39) >    EXAM:  RAD FOOT MIN 3 VIEWS LT        PROCEDURE DATE:  Dec 27 2019         INTERPRETATION:  CLINICAL INDICATION: baseline postoperative evaluation status post partial left 5th ray resection for infection    EXAM:  Portable frontal oblique and lateral left foot from 12/27/2019 at 1439. Compared to preoperative images from 12/25/2019.    IMPRESSION:  Status post left 5th toe and partial 5th metatarsal head resection with sharp smooth and beveled osseous stump margin and with postsurgical change in the overlying soft tissues.    No proximally tracking gas collections and no focal areas of osteomyelitis.    Remainder of foot unchanged.    Correlate with intraoperative findings.    < end of copied text >

## 2019-12-27 NOTE — PROGRESS NOTE ADULT - PROBLEM SELECTOR PLAN 1
Pt meets sepsis criteria with Fever of 101.5 and HR  > 100  Likely secondary to OM  On Vanc/Cefepime, Vanc trough- 10.9. Monitor Vanc trough   Continue Tylenol   On  IVF   Blood cultures- NGTD   Wound cultures- GPC   ID on board

## 2019-12-27 NOTE — DISCHARGE NOTE PROVIDER - CARE PROVIDER_API CALL
Endocrine Follow Up,   -Follow up with [facility], [address] [place] [address], [address], [facility] [address], [phone]   *Patient to call to schedule appt, please ask for next available CDE/MD appointment after discharge.  Phone: (   )    -  Fax: (   )    -  Follow Up Time:     [facility]  Phone: [phone]  Fax: (   )    -  Follow Up Time: Endocrine Follow Up,   -Follow up with [facility], [address] [place] [address], [address], [place] [address], [phone]   *Patient to call to schedule appt, please ask for next available CDE/MD appointment after discharge.  Phone: (   )    -  Fax: (   )    -  Follow Up Time:     Mirza   PODIATRY FOLLOW UP  Phone: [phone]  Fax: (   )    -  Follow Up Time:     Val Cobian)  Infectious Disease; Internal Medicine  [address] [place] [address]  Phone: [phone]  Fax: [phone]  Follow Up Time:

## 2019-12-27 NOTE — PROGRESS NOTE ADULT - SUBJECTIVE AND OBJECTIVE BOX
Rashaad De La Garza MD  Interventional Cardiology / Endovascular Specialist  [place] Office : [address] [place] [address] [place] [address]  Tel:   [place] Office : [address] [facility] [place] [address]  Tel: [phone]  Cell : [phone]    HISTORY OF PRESENTING ILLNESS:    5 yo M hx T1DM, HLD, recurrent foot infection presenting due to weeks of left 5th toe pain. found to have OM planned for toe re  	  MEDICATIONS:  heparin  Injectable 5000 Unit(s) SubCutaneous two times a day    cefepime   IVPB 1000 milliGRAM(s) IV Intermittent every 8 hours  vancomycin  IVPB 1000 milliGRAM(s) IV Intermittent every 8 hours      acetaminophen   Tablet .. 325 milliGRAM(s) Oral every 4 hours PRN      dextrose 40% Gel 15 Gram(s) Oral once PRN  dextrose 50% Injectable 12.5 Gram(s) IV Push once  dextrose 50% Injectable 25 Gram(s) IV Push once  dextrose 50% Injectable 25 Gram(s) IV Push once  glucagon  Injectable 1 milliGRAM(s) IntraMuscular once PRN  insulin glargine Injectable (LANTUS) 25 Unit(s) SubCutaneous at bedtime  insulin lispro (HumaLOG) corrective regimen sliding scale   SubCutaneous three times a day before meals  insulin lispro (HumaLOG) corrective regimen sliding scale   SubCutaneous at bedtime  insulin lispro Injectable (HumaLOG) 15 Unit(s) SubCutaneous three times a day before meals    dextrose 5%. 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 No nausea, vomiting, or hematemesis; No diarrhea or constipation. No melena or hematochezia.  GENITOURINARY: No dysuria, frequency, hematuria, or incontinence  NEUROLOGICAL: No headaches, memory loss, loss of strength, numbness, or tremors  ENDOCRINE: No heat or cold intolerance; No hair loss  MUSCULOSKELETAL: No joint pain or swelling; No muscle, back, or extremity pain  PSYCHIATRIC: No depression, anxiety, mood swings, or difficulty sleeping  HEME/LYMPH: No easy bruising, or bleeding gums  All others negative    PHYSICAL EXAM:  T(C): 37.1 (12-27-19 @ 21:50), Max: 37.4 (12-26-19 @ 23:11)  HR: 93 (12-27-19 @ 21:50) (85 - 115)  BP: 119/74 (12-27-19 @ 21:50) (113/74 - 145/78)  RR: 16 (12-27-19 @ 21:50) (13 - 24)  SpO2: 96% (12-27-19 @ 21:50) (95% - 100%)  Wt(kg): --  I&O's Summary    26 Dec 2019 07:01  -  27 Dec 2019 07:00  --------------------------------------------------------  IN: 500 mL / OUT: 350 mL / NET: 150 mL      Height (cm): 172.7 (12-27 @ 21:50)  Weight (kg): 100.3 (12-27 @ 21:50)  BMI (kg/m2): 33.6 (12-27 @ 21:50)  BSA (m2): 2.13 (12-27 @ 21:50)    GENERAL: NAD, well-groomed, well-developed  EYES: EOMI, PERRLA, conjunctiva and sclera clear  ENMT: No tonsillar erythema, exudates, or enlargement; Moist mucous membranes, Good dentition, No lesions  Cardiovascular: Normal S1 S2, No JVD, No murmurs, No edema  Respiratory: Lungs clear to auscultation	  Gastrointestinal:  Soft, Non-tender, + BS	  Extremities: Normal range of motion, No clubbing, cyanosis or edema  LYMPH: No lymphadenopathy noted  NERVOUS SYSTEM:  Alert & Oriented X3, Good concentration; Motor Strength 5/5 B/L upper and lower extremities; DTRs 2+ intact and symmetric                                    14.3   9.33  )-----------( 281      ( 27 Dec 2019 06:00 )             44.3     12-27    134<L>  |  97<L>  |  14  ----------------------------<  262<H>  4.3   |  25  |  0.83    Ca    9.3      27 Dec 2019 06:00  Phos  2.7     12-27  Mg     1.8     12-27    TPro  7.6  /  Alb  3.9  /  TBili  0.6  /  DBili  x   /  AST  22  /  ALT  29  /  AlkPhos  96  12-26    proBNP:   Lipid Profile:   HgA1c:   TSH:     Consultant(s) Notes Reviewed:  [x ] YES  [ ] NO    Care Discussed with Consultants/Other Providers [ x] YES  [ ] NO    Imaging Personally Reviewed independently:  [x] YES  [ ] NO    All labs, radiologic studies, vitals, orders and medications list reviewed. Patient is seen and examined at bedside. Case discussed with medical team.

## 2019-12-27 NOTE — BRIEF OPERATIVE NOTE - SPECIMENS
Dirty left 5th toe & metatarsal head pathology, Clean bone margin 5th metatarsal culture and pathology

## 2019-12-27 NOTE — DISCHARGE NOTE PROVIDER - NSDCFUADDINST_GEN_ALL_CORE_FT
Podiatry Discharge Instructions:  Follow up: Please follow up with Dr. Blue within 1 week of discharge from the hospital, please call [phone] for appointment and discuss that you recently were seen in the hospital.  Wound Care: Please leave your dressing clean dry intact until your follow up appointment   Weight bearing: Please weight bear as tolerated in a surgical shoe.  Antibiotics: Please continue as instructed. Podiatry Discharge Instructions:  Follow up: Please follow up with Dr. Blue within 1 week of discharge from the hospital, please call [phone] for appointment and discuss that you recently were seen in the hospital.  Wound Care: Please leave your dressing clean dry intact until your follow up appointment   Weight bearing: Please weight bear as tolerated in a surgical shoe.  Antibiotics: Please continue as instructed.    Follow up with endocrinology within 1-2 weeks; call for appointment. Podiatry Discharge Instructions:  Follow up: Please follow up with Dr. Blue within 1 week of discharge from the hospital, please call [phone] for appointment and discuss that you recently were seen in the hospital.  Wound Care: Please leave your dressing clean dry intact until your follow up appointment   Weight bearing: Please weight bear as tolerated in a surgical shoe.  Antibiotics: Please continue as instructed.    -[facility] up with [facility], [address] [place] [address], [address], [facility] [address], [phone]   *Patient to call to schedule appt, please ask for next available CDE/MD appointment after discharge. Podiatry Discharge Instructions:  Follow up: Please follow up with Dr. Blue within 1 week of discharge from the hospital, please call [phone] for appointment and discuss that you recently were seen in the hospital.  Wound Care: Please leave your dressing clean dry intact until your follow up appointment   Weight bearing: Please weight bear as tolerated in a surgical shoe.  Antibiotics: Please continue as instructed.    -Follow up with [facility], [address] [place] [address], [address], [facility] [address], [phone]   *Patient to call to schedule appt, please ask for next available CDE/MD appointment after discharge.    Follow up with infectious disease Dr. Cobian within 1-2 weeks; call for appointment.

## 2019-12-27 NOTE — BRIEF OPERATIVE NOTE - NSICDXBRIEFPROCEDURE_GEN_ALL_CORE_FT
PROCEDURES:  Partial amputation of fifth ray of left foot by open approach 27-Dec-2019 14:26:07  Jose Carlos Rosales

## 2019-12-27 NOTE — DISCHARGE NOTE PROVIDER - HOSPITAL COURSE
34M w/T1DM, HLD, recurrent foot infection presenting due to weeks of left 5th toe pain. Imaging finding concerning for OM, s/p L foot partial fifth ray resection        Hospital Course:    1. Sepsis 2/2 OM L foot, resolved     Vanc/Cefepime, Monitor vanc trough, can transition to PO abx for 5-7 days if path comes back with clean margins per ID     Blood cultures- NGTD, Wound cultures- Strep pyogenes. Bone cx - NGTD.     s/p L foot partial fifth ray resection on 12/27     -ID and podiatry following     -WBAT in sx shoe to the left foot.         2. Type 1 diabetes mellitus with hyperglycemia.  Plan: Reports taking 35-40U lantus and 24u qac at home, does not have endo followup    A1c 9.1    -C/w Lantus 40U qhs, c/w Humalog 25U qac    -Endocrine following, FS and low dose ISS qac and qHS.         3. Hyperlipidemia, , HDL 37:     -Patient reports being on atorvastatin 20mg qHS at home- will confirm with pharmacy. Will need to increase dose if patient already on atorvastatin.         4. EKG abnormalities: EKG with TWI in V2, V5, V6 and Avf, EKG from 2017 - NSR with no TWI     -No history of heart disease in self. Mother ( Aged 60) has history of Heart disease and had stents placed a year back     -TTE wnl, no need for tele     -Cardiology following, appreciate recs.         4. Smoking. Active smoker, offered replacement but pt doesn't think he needs it.        Case discussed with Dr. Henriquez, Pt medically cleared for discharge to home *******INCOMPLETE 34M w/T1DM, HLD, recurrent foot infection presenting due to weeks of left 5th toe pain. Imaging finding concerning for OM, s/p L foot partial fifth ray resection        Hospital Course:    1. Sepsis 2/2 OM L foot, resolved     Vanc/Cefepime, Monitor vanc trough, can transition to PO abx for 5-7 days if path comes back with clean margins per ID     Blood cultures- NGTD, Wound cultures- Strep pyogenes. Bone cx - NGTD.     s/p L foot partial fifth ray resection on 12/27     -ID and podiatry following     -WBAT in sx shoe to the left foot.         2. Type 1 diabetes mellitus with hyperglycemia.  Plan: Reports taking 35-40U lantus and 24u qac at home, does not have endo followup    A1c 9.1    -C/w Lantus 40U qhs, c/w Humalog 25U qac    -Endocrine following, FS and low dose ISS qac and qHS.         3. Hyperlipidemia, , HDL 37:     -Patient reports being on atorvastatin 20mg qHS at home- will confirm with pharmacy. Will need to increase dose if patient already on atorvastatin.         4. EKG abnormalities: EKG with TWI in V2, V5, V6 and Avf, EKG from 2017 - NSR with no TWI     -No history of heart disease in self. Mother ( Aged 60) has history of Heart disease and had stents placed a year back     -TTE wnl, no need for tele     -Cardiology following, appreciate recs.         4. Smoking. Active smoker, offered replacement but pt doesn't think he needs it.        Case discussed with Dr. Henriquez, Pt medically cleared for discharge to home.  Outpatient f/u with ID Dr. Cobian & podiatry. HPI:    35 yo M hx T1DM, HLD, recurrent foot infection presenting due to weeks of left 5th toe pain. Patient reported foot has been swelling more than usual and bleeding. He also noticed occasional numbness and tingling of the toe. No hx of amputation or other procedure on the affected foot. Patient uses tylenol and aleve for pain at home. Pain is so severe that is having difficulty walking on the toe. Of note, patient was admitted to [facility] in Sept 2019 for similar complaints. Received IV abx and discharged with podiatry f/u. Prior wound culture hx of MRSA positive results. (25 Dec 2019 22:54)        On admission:     Xray left foot: Possible osteomyelitis middle phalanx fifth toe.    Repeat xray left foot: Status post left 5th toe and partial 5th metatarsal head resection with sharp smooth and beveled osseous stump margin and with postsurgical change in the overlying soft tissues.No proximally tracking gas collections and no focal areas of osteomyelitis. Remainder of foot unchanged.Correlate with intraoperative findings.    EDDI/PVR: 1. The ankle-brachial index (EDDI) on the right was not accurately assessed because of non-compressible (calcified) vessels. The toe-brachial index (TBI) on the right is mildly reduced (0.60). Waveforms are diminished at the right metatarsal and digit levels, suggestive of at least below-ankle small vessel arterial disease. 2. The EDDI on the left is elevated (1.71), likely due to calcific vessels.The toe-brachial index (TBI) on the left is moderately reduced (0.49).  Waveforms are diminished at the  left metatarsal and digit levels, suggestive of at least below-ankle small vessel arterial disease.     Echo: 1. Normal mitral valve. 2. Normal trileaflet aortic valve. 3. Normal left ventricular internal dimensions and wall thicknesses. 4. Normal left ventricular systolic function. No segmental wall motion abnormalities. 5. Normal right ventricular size and function.        Evaluated by podiatry: s/p left foot partial 5th ray resection 12/27. According to OR findings, there is low concerns for residual infection. Clean bone culture has been no growth for 3 days. Patient is stable to be discharged per podiatry with PO Abx. Please keep left foot dressing clean, dry and intact till office follow up.        Evaluated by endo: 35 yo M hx T1DM (Hba1c 9.1%, uncontrolled), HLD, recurrent foot infection presenting due to weeks of left 5th toe pain and discharge, found to have osteomyelitis s/p left foot partial fifth ray resection on 12/27/19. Endocrine team consulted for uncontrolled T1DM.     1. Uncontrolled DM 1 c/b neuropathy     -Inpatient BG target 100-180 mg/dl; patient above target today as described above     -Noted patient has unbalanced basal/bolus regimen, and this AM with fasting hyperglycemia; therefore recommend increasing Lantus to 48 units SQ qHS    -Continue Humalog 28 units SQ TID before meals (Hold if NPO/not eating meal)--> patient demonstrated good response to this dose yesterday after lunch (25 +3 correction for total of 28, was 121 mg/dl before dinner) and was also on target after dinner (received 28 units at dinner, was 113 mg/dl at bedtime)    -While inpatient, continue Humalog moderate dose correctional scale SQ TID before meals and Humalog moderate dose bedtime correctional scale SQ qHS     -Check BG before meals and bedtime    -Consistent carbohydrate diet     Discharge Plan:    -If discharged today, recommend current doses as above, patient reports using Admelog PEN and Basaglar PEN at home. Therefore, recommend Admelog 28 units SQ TID before meals (Hold if not eating meal) and Basaglar 48 units SQ qHS. These doses are increases from previous home doses (A1c 9.1) but patient will likely need further titration as outpatient, notified patient to call endocrine for follow up appt after discharge.    -Recommend diet and lifestyle changes (including addition of exercise when able) and adherence to prescribed regimen. Patient should follow consistent carbohydrate, low fat, low cholesterol diet, monitor blood glucose 4x per day and take insulin doses as prescribed.    -Ensure patient has supplies including glucometer (per insurance), glucose test strips, lancets, alcohol swabs and insulin pen needles (enough for 4x per day injections)    -Follow up with [facility], [address] [place] [address], [address], [facility] [address], [phone]     *Patient to call to schedule appt, please ask for next available CDE/MD appointment after discharge.        Evaluated by ID: 35 yo man with diabetes since age 6 h/o right foot infection requiring amputation of 5th toe in 2017 now with foot infection left 5th toe. h/o MRSA in wound 2017. Preliminary wound culture 12/25 shows group A strep and coag neg staph. Blood cultures no growth to date. Xray suggestive of osteomyelitis. s/p left 5th toe and partial metatarsal head resection on 12/27/19.   proximal bone no growth x 48 h.  pathology testing.    s/p vanc & cefepime.  de-escalated abx 12/30 to cefazolin.  As per ID, d/c home on keflex 500mg po q5h x 7 days.  Outpatient f/u with podiatry & ID.        Patient cleared for d/c home with outpatient PT.  Discussed with Dr. Henriquez 12/31.  Outpatient f/u with podiatry, endo & ID.

## 2019-12-27 NOTE — PROGRESS NOTE ADULT - SUBJECTIVE AND OBJECTIVE BOX
Podiatry Pager #: [phone]    Patient is a 34y old  Male who presents with a chief complaint of Osteomyelitis (26 Dec 2019 17:16)      INTERVAL HPI/OVERNIGHT EVENTS:   Pt is scheduled for L foot partial 5th ray resection @ 1:00pm w/ Dr. Blue. Patient is aware of procedure and is NPO since midnight.    MEDICATIONS  (STANDING):  cefepime   IVPB 1000 milliGRAM(s) IV Intermittent every 8 hours  dextrose 5%. 1000 milliLiter(s) (50 mL/Hr) IV Continuous <Continuous>  dextrose 50% Injectable 12.5 Gram(s) IV Push once  dextrose 50% Injectable 25 Gram(s) IV Push once  dextrose 50% Injectable 25 Gram(s) IV Push once  heparin  Injectable 5000 Unit(s) SubCutaneous two times a day  influenza   Vaccine 0.5 milliLiter(s) IntraMuscular once  insulin glargine Injectable (LANTUS) 25 Unit(s) SubCutaneous at bedtime  insulin lispro (HumaLOG) corrective regimen sliding scale   SubCutaneous three times a day before meals  insulin lispro (HumaLOG) corrective regimen sliding scale   SubCutaneous at bedtime  insulin lispro Injectable (HumaLOG) 15 Unit(s) SubCutaneous three times a day before meals  sodium chloride 0.9%. 1000 milliLiter(s) (50 mL/Hr) IV Continuous <Continuous>  vancomycin  IVPB 1000 milliGRAM(s) IV Intermittent every 8 hours    MEDICATIONS  (PRN):  acetaminophen   Tablet .. 325 milliGRAM(s) Oral every 4 hours PRN Moderate Pain (4 - 6), Severe Pain (7 - 10)  dextrose 40% Gel 15 Gram(s) Oral once PRN Blood Glucose LESS THAN 70 milliGRAM(s)/deciliter  glucagon  Injectable 1 milliGRAM(s) IntraMuscular once PRN Glucose LESS THAN 70 milligrams/deciliter      Allergies    No Known Allergies    Intolerances        Vital Signs Last 24 Hrs  T(C): 37.1 (27 Dec 2019 06:15), Max: 38.5 (26 Dec 2019 13:29)  T(F): 98.8 (27 Dec 2019 06:15), Max: 101.3 (26 Dec 2019 13:29)  HR: 102 (27 Dec 2019 06:15) (96 - 120)  BP: 120/76 (27 Dec 2019 06:15) (107/71 - 145/77)  BP(mean): --  RR: 17 (27 Dec 2019 06:15) (17 - 19)  SpO2: 98% (27 Dec 2019 06:15) (98% - 100%)    LABS:                        14.9   11.09 )-----------( 292      ( 26 Dec 2019 05:55 )             45.6     12-26    133<L>  |  95<L>  |  12  ----------------------------<  217<H>  4.2   |  23  |  0.72    Ca    9.6      26 Dec 2019 05:55  Phos  2.2     12-26  Mg     1.7     12-26    TPro  7.6  /  Alb  3.9  /  TBili  0.6  /  DBili  x   /  AST  22  /  ALT  29  /  AlkPhos  96  12-26        CAPILLARY BLOOD GLUCOSE      POCT Blood Glucose.: 239 mg/dL (26 Dec 2019 21:40)  POCT Blood Glucose.: 252 mg/dL (26 Dec 2019 16:30)  POCT Blood Glucose.: 196 mg/dL (26 Dec 2019 11:14)  POCT Blood Glucose.: 237 mg/dL (26 Dec 2019 07:25)      RADIOLOGY & ADDITIONAL TESTS:

## 2019-12-27 NOTE — PROGRESS NOTE ADULT - PROBLEM SELECTOR PLAN 8
1.  Name of PCP:   2.  PCP Contacted on Admission: [ ] Y    [x] N    3.  PCP contacted at Discharge: [ ] Y    [ ] N    [ ] N/A  4.  Post-Discharge Appointment Date and Location:  5.  Summary of Handoff given to PCP: 1.  Name of PCP:  Ally Jo  2.  PCP Contacted on Admission: [ X] Y    [x] N  - spoke to Medical assistant on 12/27  3.  PCP contacted at Discharge: [ ] Y    [ ] N    [ ] N/A  4.  Post-Discharge Appointment Date and Location:  5.  Summary of Handoff given to PCP:

## 2019-12-27 NOTE — PROGRESS NOTE ADULT - PROBLEM SELECTOR PLAN 3
No BS  in  200s,   Pt was placed NPO overnight   Received Lantus 25 unit, Hold meal time Humalog while NPO   On  lantus 25u and premeal 15u while inpatient. (uses 35u lantus and 24u premeals at home)  Monitor BS   Uptitrate Insulin as per needs

## 2019-12-27 NOTE — DISCHARGE NOTE PROVIDER - CARE PROVIDERS DIRECT ADDRESSES
,DirectAddress_Unknown,DirectAddress_Unknown ,DirectAddress_Unknown,DirectAddress_Unknown,[email or web address]

## 2019-12-27 NOTE — DISCHARGE NOTE PROVIDER - NSDCCPCAREPLAN_GEN_ALL_CORE_FT
PRINCIPAL DISCHARGE DIAGNOSIS  Diagnosis: Osteomyelitis  Assessment and Plan of Treatment: Complete course of antibiotics as directed. Follow up with your primary infectious disease doctor, Dr. Cobian for further monitoring in 1-2 weeks. Please call to arrange appointment. Continue local wound care as directed.      SECONDARY DISCHARGE DIAGNOSES  Diagnosis: Essential hypertension  Assessment and Plan of Treatment: Ensure compliance with your medications as directed. Low cholesterol diet. Salt restriction. 1800Kcal diabetic diet with carb restriction. Follow up with your primary care physician for further monitoring in 1-2 weeks. Please call to arrange appointment.    Diagnosis: Type 1 diabetes mellitus with hyperglycemia  Assessment and Plan of Treatment: Ensure compliance with your insulin regimen as directed. Follow up with your Endocrinologist for further monitoring in 1-2 weeks. Please call to arrange appointment. Continue diet modification. Avoid complex carbohydrates such as bread, pasta, cereal, white rice, white potatoes, etc. Avoid concentrated sugar as found in desserts, candy, soda, juice, etc. Consume a diet based on lean protein (chicken, fish) and vegetables.   Please check you fingersticks every morning, or if you are not feeling well and before meals.  If your fingerstick is >300 x 2 or more readings. Please contact primary doctor or endocrinologist.  If your fingerstick is less than 70 and/or you have symptoms of very low blood sugar, FIRST drink 1/2 cup of apple juice (or take 4 glucose tabs) and recheck fingerstick in 15 minutes. Repeat these steps until blood sugar is above 100, if necessary then call your doctor to discuss low blood sugar.    Diagnosis: Smoking  Assessment and Plan of Treatment: Smoking Cessation! If you are interested in help quitting smoking, please call [facility] Sm[facility]essation [facility] for assistance, [phone]. Use Nicotine replacement as needed (patch, gum, lozenges, etc.)    Diagnosis: Hyperlipidemia, unspecified hyperlipidemia type  Assessment and Plan of Treatment: Ensure compliance with your medications as directed. Low cholesterol diet. Salt restriction. 1800Kcal diabetic diet with carb restriction. Follow up with your primary care physician for further monitoring in 1-2 weeks. Please call to arrange appointment. PRINCIPAL DISCHARGE DIAGNOSIS  Diagnosis: Osteomyelitis  Assessment and Plan of Treatment: Complete course of antibiotics as directed.  Take keflex 500mg by mouth every 6 hours for 7 days.  Follow up with your primary infectious disease doctor, Dr. Cobian for further monitoring in 1-2 weeks. Please call to arrange appointment.  Podiatry Discharge Instructions:  Follow up: Please follow up with Dr. Blue within 1 week of discharge from the hospital, please call [phone] for appointment and discuss that you recently were seen in the hospital.  Wound Care: Please leave your dressing clean dry intact until your follow up appointment   Weight bearing: Please weight bear as tolerated with left foot in a surgical shoe.  Antibiotics: Please continue as instructed.      SECONDARY DISCHARGE DIAGNOSES  Diagnosis: Type 1 diabetes mellitus with hyperglycemia  Assessment and Plan of Treatment: Take basaglar 48 units subcutaneously at bedtime.   Take admalog 28 units three times a day prior to each meal.   Check fingersticks prior to each meal & prior to bedtime (4 times a day total).   Monitor finger sticks pre-meal and bedtime, low salt, fat and carbohydrate diet, minimize glucose intake.  Exercise daily for at least 30 minutes and weight loss.  Follow up with primary care physician and endocrinologist for routine Hemoglobin A1C checks and management.  Follow up with your ophthalmologist for routine yearly vision exams.  -Follow up with [facility], [address] [place] [address], [address], [facility] [address], [phone]   *Patient to call to schedule appt, please ask for next available CDE/MD appointment after discharge.    Diagnosis: Smoking  Assessment and Plan of Treatment: Smoking Cessation is higly recommended as this habit can cause & contribute to a multitude of health conditions.  If you are interested in help quitting smoking, please call [facility] Smoking [facility]essation [facility] for assistance, [phone]. Use Nicotine replacement as needed (patch, gum, lozenges, etc.)    Diagnosis: Essential hypertension  Assessment and Plan of Treatment: Ensure compliance with your medications as directed. Low cholesterol diet. Salt restriction. 1800Kcal diabetic diet with carb restriction. Follow up with your primary care physician for further monitoring in 1-2 weeks. Please call to arrange appointment.    Diagnosis: Hyperlipidemia, unspecified hyperlipidemia type  Assessment and Plan of Treatment: Ensure compliance with your medications as directed. Low cholesterol diet. Salt restriction. 1800Kcal diabetic diet with carb restriction. Follow up with your primary care physician for further monitoring in 1-2 weeks. Please call to arrange appointment. PRINCIPAL DISCHARGE DIAGNOSIS  Diagnosis: Osteomyelitis  Assessment and Plan of Treatment: Complete course of antibiotics as directed.  Take keflex 500mg by mouth every 6 hours for 7 days.  Follow up with your primary infectious disease doctor, Dr. Cobian for further monitoring in 1-2 weeks. Please call to arrange appointment.  Podiatry Discharge Instructions:  Follow up: Please follow up with Dr. Blue within 1 week of discharge from the hospital, please call [phone] for appointment and discuss that you recently were seen in the hospital.  Wound Care: Please leave your dressing clean dry intact until your follow up appointment   Weight bearing: Please weight bear as tolerated with left foot in a surgical shoe.  Antibiotics: Please continue as instructed.      SECONDARY DISCHARGE DIAGNOSES  Diagnosis: Type 1 diabetes mellitus with hyperglycemia  Assessment and Plan of Treatment: Take basaglar 48 units subcutaneously at bedtime.   Take admalog 28 units three times a day prior to each meal.   Check fingersticks prior to each meal & prior to bedtime (4 times a day total).   Monitor finger sticks pre-meal and bedtime, low salt, fat and carbohydrate diet, minimize glucose intake.  Exercise daily for at least 30 minutes and weight loss.  Follow up with primary care physician and endocrinologist for routine Hemoglobin A1C checks and management.  Follow up with your ophthalmologist for routine yearly vision exams.  -Follow up with [facility], [address] [place] [address], [address], [facility] [address], [phone]   *Patient to call to schedule appt, please ask for next available CDE/MD appointment after discharge.    Diagnosis: Smoking  Assessment and Plan of Treatment: Smoking Cessation is higly recommended as this habit can cause & contribute to a multitude of health conditions.  If you are interested in help quitting smoking, please call [facility] Smoking Cessation [facility] for assistance, [phone].    Diagnosis: Essential hypertension  Assessment and Plan of Treatment: Ensure compliance with your medications as directed. Low cholesterol diet. Salt restriction. 1800Kcal diabetic diet with carb restriction. Follow up with your primary care physician for further monitoring in 1-2 weeks. Please call to arrange appointment.    Diagnosis: Hyperlipidemia, unspecified hyperlipidemia type  Assessment and Plan of Treatment: Ensure compliance with your medications as directed. Low cholesterol diet. Salt restriction. 1800Kcal diabetic diet with carb restriction. Follow up with your primary care physician for further monitoring in 1-2 weeks. Please call to arrange appointment.

## 2019-12-27 NOTE — BRIEF OPERATIVE NOTE - OPERATION/FINDINGS
s/p left foot partial fifth ray resection, no concern for residual infection, healthy bleeding observed with no concern for viability s/p left foot partial fifth ray resection, low concern for residual infection, healthy bleeding observed with no concern for viability

## 2019-12-27 NOTE — PROGRESS NOTE ADULT - PROBLEM SELECTOR PLAN 2
Xray left foot showing cortical erosion along the lateral aspect of the mid 5th phalanx may represent OM   started on vanc and cefepime, will continue for now. Monitor Vanc trough   ESR- 28, CRP- 32  Wound cultures- GPC in cocci   Prior wound culture hx of MRSA positive results.  Appreciate  pod recs- FU EDDI/PVR ,if abnormal read rec vasc sx c/s  As per Podiatry  "Pt tentatively booked for left foot 5th digit amputation tomorrow, 1:00pm pending patient decision.  Discussed risks vs benefits of amputation vs. conservative treatment to include use of long term abx via PICC with the associated risks of renal damage, resistance, PICC line infection, etc.  Patient understands and will decide within the next 24h"  continue local wound care  WBAT in sx shoe to the left foot Xray left foot showing cortical erosion along the lateral aspect of the mid 5th phalanx may represent OM   started on vanc and cefepime, will continue for now. Monitor Vanc trough   ESR- 28, CRP- 32  Wound cultures- GPC in cocci   Prior wound culture hx of MRSA positive results.  Appreciate  pod recs- FU EDDI/PVR ,if abnormal read rec vasc sx c/s  Plan for toe amputation this afternoon   continue local wound care  WBAT in sx shoe to the left foot

## 2019-12-28 LAB
ANION GAP SERPL CALC-SCNC: 13 MMO/L — SIGNIFICANT CHANGE UP (ref 7–14)
ANION GAP SERPL CALC-SCNC: 13 MMO/L — SIGNIFICANT CHANGE UP (ref 7–14)
BUN SERPL-MCNC: 15 MG/DL — SIGNIFICANT CHANGE UP (ref 7–23)
BUN SERPL-MCNC: 15 MG/DL — SIGNIFICANT CHANGE UP (ref 7–23)
CALCIUM SERPL-MCNC: 9.1 MG/DL — SIGNIFICANT CHANGE UP (ref 8.4–10.5)
CALCIUM SERPL-MCNC: 9.1 MG/DL — SIGNIFICANT CHANGE UP (ref 8.4–10.5)
CHLORIDE SERPL-SCNC: 97 MMOL/L — LOW (ref 98–107)
CHLORIDE SERPL-SCNC: 97 MMOL/L — LOW (ref 98–107)
CO2 SERPL-SCNC: 23 MMOL/L — SIGNIFICANT CHANGE UP (ref 22–31)
CO2 SERPL-SCNC: 23 MMOL/L — SIGNIFICANT CHANGE UP (ref 22–31)
CREAT SERPL-MCNC: 0.68 MG/DL — SIGNIFICANT CHANGE UP (ref 0.5–1.3)
CREAT SERPL-MCNC: 0.68 MG/DL — SIGNIFICANT CHANGE UP (ref 0.5–1.3)
CULTURE RESULTS: SIGNIFICANT CHANGE UP
GLUCOSE BLDC GLUCOMTR-MCNC: 174 MG/DL — HIGH (ref 70–99)
GLUCOSE BLDC GLUCOMTR-MCNC: 212 MG/DL — HIGH (ref 70–99)
GLUCOSE BLDC GLUCOMTR-MCNC: 277 MG/DL — HIGH (ref 70–99)
GLUCOSE BLDC GLUCOMTR-MCNC: 355 MG/DL — HIGH (ref 70–99)
GLUCOSE SERPL-MCNC: 242 MG/DL — HIGH (ref 70–99)
GLUCOSE SERPL-MCNC: 242 MG/DL — HIGH (ref 70–99)
HCT VFR BLD CALC: 40.4 % — SIGNIFICANT CHANGE UP (ref 39–50)
HGB BLD-MCNC: 13.2 G/DL — SIGNIFICANT CHANGE UP (ref 13–17)
MAGNESIUM SERPL-MCNC: 1.8 MG/DL — SIGNIFICANT CHANGE UP (ref 1.6–2.6)
MCHC RBC-ENTMCNC: 27.6 PG — SIGNIFICANT CHANGE UP (ref 27–34)
MCHC RBC-ENTMCNC: 32.7 % — SIGNIFICANT CHANGE UP (ref 32–36)
MCV RBC AUTO: 84.3 FL — SIGNIFICANT CHANGE UP (ref 80–100)
NRBC # FLD: 0 K/UL — SIGNIFICANT CHANGE UP (ref 0–0)
PLATELET # BLD AUTO: 295 K/UL — SIGNIFICANT CHANGE UP (ref 150–400)
PMV BLD: 9.4 FL — SIGNIFICANT CHANGE UP (ref 7–13)
POTASSIUM SERPL-MCNC: 3.8 MMOL/L — SIGNIFICANT CHANGE UP (ref 3.5–5.3)
POTASSIUM SERPL-MCNC: 3.8 MMOL/L — SIGNIFICANT CHANGE UP (ref 3.5–5.3)
POTASSIUM SERPL-SCNC: 3.8 MMOL/L — SIGNIFICANT CHANGE UP (ref 3.5–5.3)
POTASSIUM SERPL-SCNC: 3.8 MMOL/L — SIGNIFICANT CHANGE UP (ref 3.5–5.3)
RBC # BLD: 4.79 M/UL — SIGNIFICANT CHANGE UP (ref 4.2–5.8)
RBC # FLD: 12.5 % — SIGNIFICANT CHANGE UP (ref 10.3–14.5)
SODIUM SERPL-SCNC: 133 MMOL/L — LOW (ref 135–145)
SODIUM SERPL-SCNC: 133 MMOL/L — LOW (ref 135–145)
VANCOMYCIN TROUGH SERPL-MCNC: 10.8 UG/ML — SIGNIFICANT CHANGE UP (ref 10–20)
WBC # BLD: 9.96 K/UL — SIGNIFICANT CHANGE UP (ref 3.8–10.5)
WBC # FLD AUTO: 9.96 K/UL — SIGNIFICANT CHANGE UP (ref 3.8–10.5)

## 2019-12-28 PROCEDURE — 99233 SBSQ HOSP IP/OBS HIGH 50: CPT

## 2019-12-28 PROCEDURE — 93306 TTE W/DOPPLER COMPLETE: CPT | Mod: 26

## 2019-12-28 PROCEDURE — 99232 SBSQ HOSP IP/OBS MODERATE 35: CPT

## 2019-12-28 RX ORDER — VANCOMYCIN HCL 1 G
1250 VIAL (EA) INTRAVENOUS EVERY 8 HOURS
Refills: 0 | Status: DISCONTINUED | OUTPATIENT
Start: 2019-12-28 | End: 2019-12-30

## 2019-12-28 RX ORDER — INSULIN GLARGINE 100 [IU]/ML
30 INJECTION, SOLUTION SUBCUTANEOUS AT BEDTIME
Refills: 0 | Status: DISCONTINUED | OUTPATIENT
Start: 2019-12-28 | End: 2019-12-29

## 2019-12-28 RX ORDER — INSULIN LISPRO 100/ML
18 VIAL (ML) SUBCUTANEOUS
Refills: 0 | Status: DISCONTINUED | OUTPATIENT
Start: 2019-12-28 | End: 2019-12-29

## 2019-12-28 RX ADMIN — CEFEPIME 100 MILLIGRAM(S): 1 INJECTION, POWDER, FOR SOLUTION INTRAMUSCULAR; INTRAVENOUS at 02:46

## 2019-12-28 RX ADMIN — Medication 2: at 09:09

## 2019-12-28 RX ADMIN — CEFEPIME 100 MILLIGRAM(S): 1 INJECTION, POWDER, FOR SOLUTION INTRAMUSCULAR; INTRAVENOUS at 17:28

## 2019-12-28 RX ADMIN — Medication 3: at 12:47

## 2019-12-28 RX ADMIN — Medication 250 MILLIGRAM(S): at 09:13

## 2019-12-28 RX ADMIN — Medication 166.67 MILLIGRAM(S): at 17:29

## 2019-12-28 RX ADMIN — Medication 3: at 22:06

## 2019-12-28 RX ADMIN — Medication 325 MILLIGRAM(S): at 23:05

## 2019-12-28 RX ADMIN — Medication 325 MILLIGRAM(S): at 03:22

## 2019-12-28 RX ADMIN — Medication 15 UNIT(S): at 12:47

## 2019-12-28 RX ADMIN — Medication 18 UNIT(S): at 17:29

## 2019-12-28 RX ADMIN — HEPARIN SODIUM 5000 UNIT(S): 5000 INJECTION INTRAVENOUS; SUBCUTANEOUS at 17:29

## 2019-12-28 RX ADMIN — CEFEPIME 100 MILLIGRAM(S): 1 INJECTION, POWDER, FOR SOLUTION INTRAMUSCULAR; INTRAVENOUS at 10:27

## 2019-12-28 RX ADMIN — Medication 1: at 17:29

## 2019-12-28 RX ADMIN — Medication 15 UNIT(S): at 09:09

## 2019-12-28 RX ADMIN — Medication 250 MILLIGRAM(S): at 01:37

## 2019-12-28 RX ADMIN — INSULIN GLARGINE 30 UNIT(S): 100 INJECTION, SOLUTION SUBCUTANEOUS at 22:07

## 2019-12-28 RX ADMIN — Medication 325 MILLIGRAM(S): at 02:52

## 2019-12-28 RX ADMIN — HEPARIN SODIUM 5000 UNIT(S): 5000 INJECTION INTRAVENOUS; SUBCUTANEOUS at 05:27

## 2019-12-28 NOTE — PROVIDER CONTACT NOTE (CRITICAL VALUE NOTIFICATION) - TEST AND RESULT REPORTED:
positive Pt has wounds upon discharge. Wound care instructions included with discharge paperwork. Pt is able to use teach back method to show their undertstanding of wound care management and how to appropriately care for the wounds. culture strep iogenous

## 2019-12-28 NOTE — PROGRESS NOTE ADULT - PROBLEM SELECTOR PLAN 2
Xray left foot showing cortical erosion along the lateral aspect of the mid 5th phalanx may represent OM   started on vanc and cefepime, will continue for now. Monitor Vanc trough   ESR- 28, CRP- 32  Wound cultures- GPC in cocci   Prior wound culture hx of MRSA positive results.  Appreciate  pod recs- FU EDDI/PVR ,if abnormal read rec vasc sx c/s  Plan for toe amputation this afternoon   continue local wound care  WBAT in sx shoe to the left foot s/p L foot partial fifth ray resection on 12/27   -C/w vanc and cefepime, ID following   -f/u OR cultures and narrow abx   -Superficial wound cultures- GPC in cocci   -Prior wound cx w/MRSA  WBAT in sx shoe to the left foot s/p L foot partial fifth ray resection on 12/27   -C/w vanc (increased to 1250 on 12/28 for trough of 10.9) and cefepime, ID following   -f/u OR cultures and narrow abx   -Superficial wound cultures- GPC in cocci   -Prior wound cx w/MRSA  WBAT in sx shoe to the left foot

## 2019-12-28 NOTE — PROGRESS NOTE ADULT - PROBLEM SELECTOR PLAN 4
EKG with TWI in V2, V5, V6 and Avf  EKH from 2017 - NSR with no TWI   Denies CP/SOB. Able to walk about 20 blocks if no foot pain   No history of heart disease in selg.Mother ( Aged 60) has history of Heart disease and had stents placed a year back   Troponin - 13  Appreciate Cardiology -EKG changes likely rate related , tachycardia 2/2 to osteo  Fu Echo  Pt medically optimized for  left foot partial fifth ray resection under light sedation EKG with TWI in V2, V5, V6 and Avf  EKH from 2017 - NSR with no TWI   No history of heart disease in self. Mother ( Aged 60) has history of Heart disease and had stents placed a year back   Appreciate Cardiology -EKG changes likely rate related , tachycardia 2/2 to osteo  Fu Echo read

## 2019-12-28 NOTE — PROGRESS NOTE ADULT - PROBLEM SELECTOR PLAN 1
Pt meets sepsis criteria with Fever of 101.5 and HR  > 100  Likely secondary to OM  On Vanc/Cefepime, Vanc trough- 10.9. Monitor Vanc trough   Continue Tylenol   On  IVF   Blood cultures- NGTD   Wound cultures- GPC   ID on board Pt meets sepsis criteria with Fever of 101.5 and HR  > 100  Likely secondary to OM  On Vanc/Cefepime, Vanc trough- 10.9. Monitor Vanc trough   Continue Tylenol   On  IVF   Blood cultures- NGTD   Wound cultures- GPC   ID following Pt meets sepsis criteria with Fever of 101.5 and HR  > 100  Likely secondary to OM  -Vanc/Cefepime, Vanc trough- 10.8. Monitor Vanc trough   Continue Tylenol   On  IVF   Blood cultures- NGTD   Wound cultures- GPC   ID following

## 2019-12-28 NOTE — PROGRESS NOTE ADULT - PROBLEM SELECTOR PLAN 3
On 35u lantus and 24u premeals at home)  -FS above goal, in 200-250s   -Increase Lantus to   -C/w Humalog 15U TID, uptitrate as needed. On 35-40U lantus and 24u premeals at home  -FS above goal, in 200-250s   -Increase Lantus to 30U qhs  -Increase Humalog 18U TID

## 2019-12-28 NOTE — PROGRESS NOTE ADULT - SUBJECTIVE AND OBJECTIVE BOX
ANESTHESIA POSTOP CHECK    34y Male POSTOP DAY 1 S/P L 5th ray resection    Vital Signs Last 24 Hrs  T(C): 36.8 (28 Dec 2019 05:25), Max: 37.3 (27 Dec 2019 11:07)  T(F): 98.2 (28 Dec 2019 05:25), Max: 99.2 (27 Dec 2019 11:07)  HR: 89 (28 Dec 2019 05:25) (89 - 109)  BP: 119/76 (28 Dec 2019 05:25) (113/74 - 145/78)  BP(mean): 86 (27 Dec 2019 18:07) (83 - 94)  RR: 16 (28 Dec 2019 05:25) (13 - 24)  SpO2: 97% (28 Dec 2019 05:25) (95% - 100%)  I&O's Summary    27 Dec 2019 07:01  -  28 Dec 2019 07:00  --------------------------------------------------------  IN: 340 mL / OUT: 0 mL / NET: 340 mL        [x ] NO APPARENT ANESTHESIA COMPLICATIONS

## 2019-12-28 NOTE — PROGRESS NOTE ADULT - SUBJECTIVE AND OBJECTIVE BOX
Rashaad De La Garza MD  Interventional Cardiology / Endovascular Specialist  [place] Office : [address] [place] [address] [place] [address]  Tel:   [place] Office : [address] [facility] [place] [address]  Tel: [phone]  Cell : [phone]    HISTORY OF PRESENTING ILLNESS:    33 yo M hx T1DM, HLD, recurrent foot infection presenting due to weeks of left 5th toe pain. found to have OM s/p toe amp tolerated the procedure well   	  	  MEDICATIONS:  heparin  Injectable 5000 Unit(s) SubCutaneous two times a day    cefepime   IVPB 1000 milliGRAM(s) IV Intermittent every 8 hours  vancomycin  IVPB 1250 milliGRAM(s) IV Intermittent every 8 hours      acetaminophen   Tablet .. 325 milliGRAM(s) Oral every 4 hours PRN      dextrose 40% Gel 15 Gram(s) Oral once PRN  dextrose 50% Injectable 12.5 Gram(s) IV Push once  dextrose 50% Injectable 25 Gram(s) IV Push once  dextrose 50% Injectable 25 Gram(s) IV Push once  glucagon  Injectable 1 milliGRAM(s) IntraMuscular once PRN  insulin glargine Injectable (LANTUS) 30 Unit(s) SubCutaneous at bedtime  insulin lispro (HumaLOG) corrective regimen sliding scale   SubCutaneous three times a day before meals  insulin lispro (HumaLOG) corrective regimen sliding scale   SubCutaneous at bedtime  insulin lispro Injectable (HumaLOG) 18 Unit(s) SubCutaneous three times a day before meals    dextrose 5%. 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 No nausea, vomiting, or hematemesis; No diarrhea or constipation. No melena or hematochezia.  GENITOURINARY: No dysuria, frequency, hematuria, or incontinence  NEUROLOGICAL: No headaches, memory loss, loss of strength, numbness, or tremors  ENDOCRINE: No heat or cold intolerance; No hair loss  MUSCULOSKELETAL: No joint pain or swelling; No muscle, back, or extremity pain  PSYCHIATRIC: No depression, anxiety, mood swings, or difficulty sleeping  HEME/LYMPH: No easy bruising, or bleeding gums  All others negative    PHYSICAL EXAM:  T(C): 37.4 (12-28-19 @ 12:30), Max: 37.4 (12-28-19 @ 12:30)  HR: 95 (12-28-19 @ 12:30) (89 - 95)  BP: 115/66 (12-28-19 @ 12:30) (113/74 - 119/76)  RR: 18 (12-28-19 @ 12:30) (16 - 18)  SpO2: 97% (12-28-19 @ 12:30) (96% - 100%)  Wt(kg): --  I&O's Summary    27 Dec 2019 07:01  -  28 Dec 2019 07:00  --------------------------------------------------------  IN: 340 mL / OUT: 0 mL / NET: 340 mL      Height (cm): 172.7 (12-27 @ 21:50)  Weight (kg): 100.3 (12-27 @ 21:50)  BMI (kg/m2): 33.6 (12-27 @ 21:50)  BSA (m2): 2.13 (12-27 @ 21:50)    GENERAL: NAD, well-groomed, well-developed  EYES: EOMI, PERRLA, conjunctiva and sclera clear  ENMT: No tonsillar erythema, exudates, or enlargement; Moist mucous membranes, Good dentition, No lesions  Cardiovascular: Normal S1 S2, No JVD, No murmurs, No edema  Respiratory: Lungs clear to auscultation	  Gastrointestinal:  Soft, Non-tender, + BS	  Extremities: Normal range of motion, No clubbing, cyanosis or edema  LYMPH: No lymphadenopathy noted  NERVOUS SYSTEM:  Alert & Oriented X3, Good concentration; Motor Strength 5/5 B/L upper and lower extremities; DTRs 2+ intact and symmetric                                    13.2   9.96  )-----------( 295      ( 28 Dec 2019 05:45 )             40.4     12-28    133<L>  |  97<L>  |  15  ----------------------------<  242<H>  3.8   |  23  |  0.68    Ca    9.1      28 Dec 2019 05:45  Phos  2.7     12-27  Mg     1.8     12-28      proBNP:   Lipid Profile:   HgA1c:   TSH:     Consultant(s) Notes Reviewed:  [x ] YES  [ ] NO    Care Discussed with Consultants/Other Providers [ x] YES  [ ] NO    Imaging Personally Reviewed independently:  [x] YES  [ ] NO    All labs, radiologic studies, vitals, orders and medications list reviewed. Patient is seen and examined at bedside. Case discussed with medical team.

## 2019-12-28 NOTE — PROGRESS NOTE ADULT - SUBJECTIVE AND OBJECTIVE BOX
Problem: Patient Care Overview  Goal: Plan of Care Review  Outcome: Ongoing (interventions implemented as appropriate)  Updated care plan with pt    Address all needs throughout shift   One low flow during this shift, see flow sheet  Plan to interrogate ICD   Monitor H&H 9.6 31  No falls during this shift   Continue to monitor nausea and vomitting         [facility] Division of Hospital Medicine  Mary NgoDO  Pager (M-F, 6Z-5H): [phone]      Patient is a 34y old  Male who presents with a chief complaint of Osteomyelitis (28 Dec 2019 09:03)      SUBJECTIVE / OVERNIGHT EVENTS: s/p L foot partial fifth ray resection on 12/27    ADDITIONAL REVIEW OF SYSTEMS:    MEDICATIONS  (STANDING):  cefepime   IVPB 1000 milliGRAM(s) IV Intermittent every 8 hours  dextrose 5%. 1000 milliLiter(s) (50 mL/Hr) IV Continuous <Continuous>  dextrose 50% Injectable 12.5 Gram(s) IV Push once  dextrose 50% Injectable 25 Gram(s) IV Push once  dextrose 50% Injectable 25 Gram(s) IV Push once  heparin  Injectable 5000 Unit(s) SubCutaneous two times a day  influenza   Vaccine 0.5 milliLiter(s) IntraMuscular once  insulin glargine Injectable (LANTUS) 25 Unit(s) SubCutaneous at bedtime  insulin lispro (HumaLOG) corrective regimen sliding scale   SubCutaneous three times a day before meals  insulin lispro (HumaLOG) corrective regimen sliding scale   SubCutaneous at bedtime  insulin lispro Injectable (HumaLOG) 15 Unit(s) SubCutaneous three times a day before meals  vancomycin  IVPB 1000 milliGRAM(s) IV Intermittent every 8 hours    MEDICATIONS  (PRN):  acetaminophen   Tablet .. 325 milliGRAM(s) Oral every 4 hours PRN Moderate Pain (4 - 6), Severe Pain (7 - 10)  dextrose 40% Gel 15 Gram(s) Oral once PRN Blood Glucose LESS THAN 70 milliGRAM(s)/deciliter  glucagon  Injectable 1 milliGRAM(s) IntraMuscular once PRN Glucose LESS THAN 70 milligrams/deciliter      CAPILLARY BLOOD GLUCOSE      POCT Blood Glucose.: 212 mg/dL (28 Dec 2019 09:05)  POCT Blood Glucose.: 238 mg/dL (27 Dec 2019 21:26)  POCT Blood Glucose.: 255 mg/dL (27 Dec 2019 19:37)  POCT Blood Glucose.: 252 mg/dL (27 Dec 2019 16:40)  POCT Blood Glucose.: 266 mg/dL (27 Dec 2019 11:10)    I&O's Summary    27 Dec 2019 07:01  -  28 Dec 2019 07:00  --------------------------------------------------------  IN: 340 mL / OUT: 0 mL / NET: 340 mL        PHYSICAL EXAM:  Vital Signs Last 24 Hrs  T(C): 36.8 (28 Dec 2019 05:25), Max: 37.3 (27 Dec 2019 11:07)  T(F): 98.2 (28 Dec 2019 05:25), Max: 99.2 (27 Dec 2019 11:07)  HR: 89 (28 Dec 2019 05:25) (89 - 109)  BP: 119/76 (28 Dec 2019 05:25) (113/74 - 145/78)  BP(mean): 86 (27 Dec 2019 18:07) (83 - 94)  RR: 16 (28 Dec 2019 05:25) (13 - 24)  SpO2: 97% (28 Dec 2019 05:25) (95% - 100%)  CONSTITUTIONAL: NAD, well-developed, well-groomed  EYES: EOMI; conjunctiva and sclera clear  ENMT: Moist oral mucosa, no pharyngeal injection or exudates; normal dentition  NECK: Supple, no palpable masses; no thyromegaly  RESPIRATORY: Normal respiratory effort; lungs are clear to auscultation bilaterally  CARDIOVASCULAR: Regular rate and rhythm, normal S1 and S2, no murmur/rub/gallop; No lower extremity edema; Peripheral pulses are 2+ bilaterally  ABDOMEN: Nontender to palpation, normoactive bowel sounds, no rebound/guarding; No hepatosplenomegaly  MUSKULOSKELETAL:  no clubbing or cyanosis of digits; no joint swelling or tenderness to palpation  PSYCH: A+O to person, place, and time; affect appropriate  NEUROLOGY: CN 2-12 are intact and symmetric; no gross sensory deficits;   SKIN: No rashes; no palpable lesions    LABS:                        13.2   9.96  )-----------( 295      ( 28 Dec 2019 05:45 )             40.4     12-28    133<L>  |  97<L>  |  15  ----------------------------<  242<H>  3.8   |  23  |  0.68    Ca    9.1      28 Dec 2019 05:45  Phos  2.7     12-27  Mg     1.8     12-28      PT/INR - ( 27 Dec 2019 06:00 )   PT: 12.4 SEC;   INR: 1.08          PTT - ( 27 Dec 2019 06:00 )  PTT:30.5 SEC          Culture - Tissue with Gram Stain (collected 27 Dec 2019 15:45)  Source: BONE    Culture - Blood (collected 25 Dec 2019 22:46)  Source: BLOOD PERIPHERAL  Preliminary Report (27 Dec 2019 22:46):    NO ORGANISMS ISOLATED    NO ORGANISMS ISOLATED AT 48 HRS.    Culture - Blood (collected 25 Dec 2019 22:46)  Source: BLOOD  Preliminary Report (27 Dec 2019 22:46):    NO ORGANISMS ISOLATED    NO ORGANISMS ISOLATED AT 48 HRS.    Culture - Abscess with Gram Stain (collected 25 Dec 2019 22:06)  Source: OTHER  Preliminary Report (27 Dec 2019 11:14):    MANY    Routine susceptibility testing not performed as    Streptococcus Grp A/B is susceptible to drug(s) of choice    (Penicillin and Ampicillin).    STRPY^Streptococcus pyogenes (Gp A)    STCN^Staphylococcus sp.,coag neg        RADIOLOGY & ADDITIONAL TESTS:  Results Reviewed:   Imaging Personally Reviewed:  Electrocardiogram Personally Reviewed:    COORDINATION OF CARE:  Care Discussed with Consultants/Other Providers [Y/N]:  Prior or Outpatient Records Reviewed [Y/N]: [facility] Division of Hospital Medicine  Merasonu Ngo DO  Pager (M-F, 8T-5P): [phone]      Patient is a 34y old  Male who presents with a chief complaint of Osteomyelitis (28 Dec 2019 09:03)      SUBJECTIVE / OVERNIGHT EVENTS: s/p L foot partial fifth ray resection on 12/27, pt doing well, bandage changed by podiatry this morning.    ADDITIONAL REVIEW OF SYSTEMS: no cp, sob, lightheadedness, palpitations, nvd     MEDICATIONS  (STANDING):  cefepime   IVPB 1000 milliGRAM(s) IV Intermittent every 8 hours  dextrose 5%. 1000 milliLiter(s) (50 mL/Hr) IV Continuous <Continuous>  dextrose 50% Injectable 12.5 Gram(s) IV Push once  dextrose 50% Injectable 25 Gram(s) IV Push once  dextrose 50% Injectable 25 Gram(s) IV Push once  heparin  Injectable 5000 Unit(s) SubCutaneous two times a day  influenza   Vaccine 0.5 milliLiter(s) IntraMuscular once  insulin glargine Injectable (LANTUS) 25 Unit(s) SubCutaneous at bedtime  insulin lispro (HumaLOG) corrective regimen sliding scale   SubCutaneous three times a day before meals  insulin lispro (HumaLOG) corrective regimen sliding scale   SubCutaneous at bedtime  insulin lispro Injectable (HumaLOG) 15 Unit(s) SubCutaneous three times a day before meals  vancomycin  IVPB 1000 milliGRAM(s) IV Intermittent every 8 hours    MEDICATIONS  (PRN):  acetaminophen   Tablet .. 325 milliGRAM(s) Oral every 4 hours PRN Moderate Pain (4 - 6), Severe Pain (7 - 10)  dextrose 40% Gel 15 Gram(s) Oral once PRN Blood Glucose LESS THAN 70 milliGRAM(s)/deciliter  glucagon  Injectable 1 milliGRAM(s) IntraMuscular once PRN Glucose LESS THAN 70 milligrams/deciliter      CAPILLARY BLOOD GLUCOSE      POCT Blood Glucose.: 212 mg/dL (28 Dec 2019 09:05)  POCT Blood Glucose.: 238 mg/dL (27 Dec 2019 21:26)  POCT Blood Glucose.: 255 mg/dL (27 Dec 2019 19:37)  POCT Blood Glucose.: 252 mg/dL (27 Dec 2019 16:40)  POCT Blood Glucose.: 266 mg/dL (27 Dec 2019 11:10)    I&O's Summary    27 Dec 2019 07:01  -  28 Dec 2019 07:00  --------------------------------------------------------  IN: 340 mL / OUT: 0 mL / NET: 340 mL        PHYSICAL EXAM:  Vital Signs Last 24 Hrs  T(C): 36.8 (28 Dec 2019 05:25), Max: 37.3 (27 Dec 2019 11:07)  T(F): 98.2 (28 Dec 2019 05:25), Max: 99.2 (27 Dec 2019 11:07)  HR: 89 (28 Dec 2019 05:25) (89 - 109)  BP: 119/76 (28 Dec 2019 05:25) (113/74 - 145/78)  BP(mean): 86 (27 Dec 2019 18:07) (83 - 94)  RR: 16 (28 Dec 2019 05:25) (13 - 24)  SpO2: 97% (28 Dec 2019 05:25) (95% - 100%)  CONSTITUTIONAL: NAD, appears well   EYES: EOMI; conjunctiva and sclera clear  ENMT: Moist oral mucosa  NECK: Supple  RESPIRATORY: Normal respiratory effort; lungs are clear to auscultation bilaterally  CARDIOVASCULAR: Regular rate and rhythm, normal S1 and S2, no murmur/rub/gallop  ABDOMEN: obese, Nontender to palpation, normoactive bowel sounds, no rebound/guarding  MUSKULOSKELETAL:  no clubbing or cyanosis of digits; no joint swelling or tenderness to palpation, +R foot wrapped in ace bandage  PSYCH: A+O to person, place, and time; affect appropriate  NEUROLOGY: CN 2-12 are intact and symmetric; no gross sensory deficits;     LABS:                        13.2   9.96  )-----------( 295      ( 28 Dec 2019 05:45 )             40.4     12-28    133<L>  |  97<L>  |  15  ----------------------------<  242<H>  3.8   |  23  |  0.68    Ca    9.1      28 Dec 2019 05:45  Phos  2.7     12-27  Mg     1.8     12-28      PT/INR - ( 27 Dec 2019 06:00 )   PT: 12.4 SEC;   INR: 1.08          PTT - ( 27 Dec 2019 06:00 )  PTT:30.5 SEC          Culture - Tissue with Gram Stain (collected 27 Dec 2019 15:45)  Source: BONE    Culture - Blood (collected 25 Dec 2019 22:46)  Source: BLOOD PERIPHERAL  Preliminary Report (27 Dec 2019 22:46):    NO ORGANISMS ISOLATED    NO ORGANISMS ISOLATED AT 48 HRS.    Culture - Blood (collected 25 Dec 2019 22:46)  Source: BLOOD  Preliminary Report (27 Dec 2019 22:46):    NO ORGANISMS ISOLATED    NO ORGANISMS ISOLATED AT 48 HRS.    Culture - Abscess with Gram Stain (collected 25 Dec 2019 22:06)  Source: OTHER  Preliminary Report (27 Dec 2019 11:14):    MANY    Routine susceptibility testing not performed as    Streptococcus Grp A/B is susceptible to drug(s) of choice    (Penicillin and Ampicillin).    STRPY^Streptococcus pyogenes (Gp A)    STCN^Staphylococcus sp.,coag neg        RADIOLOGY & ADDITIONAL TESTS:  Results Reviewed:   Imaging Personally Reviewed:  Electrocardiogram Personally Reviewed:    COORDINATION OF CARE:  Care Discussed with Consultants/Other Providers [Y/N]: Y  Prior or Outpatient Records Reviewed [Y/N]: Y

## 2019-12-29 DIAGNOSIS — E78.5 HYPERLIPIDEMIA, UNSPECIFIED: ICD-10-CM

## 2019-12-29 DIAGNOSIS — I10 ESSENTIAL (PRIMARY) HYPERTENSION: ICD-10-CM

## 2019-12-29 LAB
ANION GAP SERPL CALC-SCNC: 15 MMO/L — HIGH (ref 7–14)
BUN SERPL-MCNC: 19 MG/DL — SIGNIFICANT CHANGE UP (ref 7–23)
CALCIUM SERPL-MCNC: 9.3 MG/DL — SIGNIFICANT CHANGE UP (ref 8.4–10.5)
CHLORIDE SERPL-SCNC: 95 MMOL/L — LOW (ref 98–107)
CO2 SERPL-SCNC: 24 MMOL/L — SIGNIFICANT CHANGE UP (ref 22–31)
CREAT SERPL-MCNC: 0.7 MG/DL — SIGNIFICANT CHANGE UP (ref 0.5–1.3)
GLUCOSE BLDC GLUCOMTR-MCNC: 205 MG/DL — HIGH (ref 70–99)
GLUCOSE BLDC GLUCOMTR-MCNC: 208 MG/DL — HIGH (ref 70–99)
GLUCOSE BLDC GLUCOMTR-MCNC: 278 MG/DL — HIGH (ref 70–99)
GLUCOSE BLDC GLUCOMTR-MCNC: 310 MG/DL — HIGH (ref 70–99)
GLUCOSE BLDC GLUCOMTR-MCNC: 325 MG/DL — HIGH (ref 70–99)
GLUCOSE SERPL-MCNC: 349 MG/DL — HIGH (ref 70–99)
HBA1C BLD-MCNC: 9.1 % — HIGH (ref 4–5.6)
HCT VFR BLD CALC: 42.3 % — SIGNIFICANT CHANGE UP (ref 39–50)
HGB BLD-MCNC: 13.8 G/DL — SIGNIFICANT CHANGE UP (ref 13–17)
MAGNESIUM SERPL-MCNC: 1.9 MG/DL — SIGNIFICANT CHANGE UP (ref 1.6–2.6)
MCHC RBC-ENTMCNC: 27.8 PG — SIGNIFICANT CHANGE UP (ref 27–34)
MCHC RBC-ENTMCNC: 32.6 % — SIGNIFICANT CHANGE UP (ref 32–36)
MCV RBC AUTO: 85.1 FL — SIGNIFICANT CHANGE UP (ref 80–100)
NRBC # FLD: 0 K/UL — SIGNIFICANT CHANGE UP (ref 0–0)
PLATELET # BLD AUTO: 314 K/UL — SIGNIFICANT CHANGE UP (ref 150–400)
PMV BLD: 9.8 FL — SIGNIFICANT CHANGE UP (ref 7–13)
POTASSIUM SERPL-MCNC: 4 MMOL/L — SIGNIFICANT CHANGE UP (ref 3.5–5.3)
POTASSIUM SERPL-SCNC: 4 MMOL/L — SIGNIFICANT CHANGE UP (ref 3.5–5.3)
RBC # BLD: 4.97 M/UL — SIGNIFICANT CHANGE UP (ref 4.2–5.8)
RBC # FLD: 12.3 % — SIGNIFICANT CHANGE UP (ref 10.3–14.5)
SODIUM SERPL-SCNC: 134 MMOL/L — LOW (ref 135–145)
VANCOMYCIN TROUGH SERPL-MCNC: 16.2 UG/ML — SIGNIFICANT CHANGE UP (ref 10–20)
WBC # BLD: 8.46 K/UL — SIGNIFICANT CHANGE UP (ref 3.8–10.5)
WBC # FLD AUTO: 8.46 K/UL — SIGNIFICANT CHANGE UP (ref 3.8–10.5)

## 2019-12-29 PROCEDURE — 99223 1ST HOSP IP/OBS HIGH 75: CPT | Mod: GC

## 2019-12-29 PROCEDURE — 99233 SBSQ HOSP IP/OBS HIGH 50: CPT

## 2019-12-29 RX ORDER — INSULIN LISPRO 100/ML
25 VIAL (ML) SUBCUTANEOUS
Refills: 0 | Status: DISCONTINUED | OUTPATIENT
Start: 2019-12-29 | End: 2019-12-30

## 2019-12-29 RX ORDER — INSULIN LISPRO 100/ML
22 VIAL (ML) SUBCUTANEOUS
Refills: 0 | Status: DISCONTINUED | OUTPATIENT
Start: 2019-12-29 | End: 2019-12-29

## 2019-12-29 RX ORDER — INSULIN GLARGINE 100 [IU]/ML
35 INJECTION, SOLUTION SUBCUTANEOUS AT BEDTIME
Refills: 0 | Status: DISCONTINUED | OUTPATIENT
Start: 2019-12-29 | End: 2019-12-29

## 2019-12-29 RX ORDER — INSULIN GLARGINE 100 [IU]/ML
40 INJECTION, SOLUTION SUBCUTANEOUS AT BEDTIME
Refills: 0 | Status: DISCONTINUED | OUTPATIENT
Start: 2019-12-29 | End: 2019-12-30

## 2019-12-29 RX ADMIN — Medication 166.67 MILLIGRAM(S): at 17:32

## 2019-12-29 RX ADMIN — Medication 166.67 MILLIGRAM(S): at 02:01

## 2019-12-29 RX ADMIN — Medication 325 MILLIGRAM(S): at 00:05

## 2019-12-29 RX ADMIN — Medication 166.67 MILLIGRAM(S): at 09:51

## 2019-12-29 RX ADMIN — INSULIN GLARGINE 40 UNIT(S): 100 INJECTION, SOLUTION SUBCUTANEOUS at 22:42

## 2019-12-29 RX ADMIN — Medication 22 UNIT(S): at 17:32

## 2019-12-29 RX ADMIN — HEPARIN SODIUM 5000 UNIT(S): 5000 INJECTION INTRAVENOUS; SUBCUTANEOUS at 05:29

## 2019-12-29 RX ADMIN — Medication 22 UNIT(S): at 12:36

## 2019-12-29 RX ADMIN — CEFEPIME 100 MILLIGRAM(S): 1 INJECTION, POWDER, FOR SOLUTION INTRAMUSCULAR; INTRAVENOUS at 09:02

## 2019-12-29 RX ADMIN — CEFEPIME 100 MILLIGRAM(S): 1 INJECTION, POWDER, FOR SOLUTION INTRAMUSCULAR; INTRAVENOUS at 01:22

## 2019-12-29 RX ADMIN — Medication 4: at 12:36

## 2019-12-29 RX ADMIN — HEPARIN SODIUM 5000 UNIT(S): 5000 INJECTION INTRAVENOUS; SUBCUTANEOUS at 17:32

## 2019-12-29 RX ADMIN — Medication 22 UNIT(S): at 09:01

## 2019-12-29 RX ADMIN — Medication 2: at 17:32

## 2019-12-29 RX ADMIN — CEFEPIME 100 MILLIGRAM(S): 1 INJECTION, POWDER, FOR SOLUTION INTRAMUSCULAR; INTRAVENOUS at 19:22

## 2019-12-29 RX ADMIN — Medication 3: at 09:01

## 2019-12-29 NOTE — PROGRESS NOTE ADULT - PROBLEM SELECTOR PLAN 3
On 35-40U lantus and 24u premeals at home  -FS above goal, in 300s  -Increase Lantus to 35U qhs  -Increase Humalog 22U TID Reports taking 35-40U lantus and 24u premeals at home, does not have endo followup  A1c 9.1  -FS above goal, in 300s  -Increase Lantus to 35U qhs  -Increase Humalog 22U TID  -Consult endocrine as pt uncontrolled type 1 diabetic and persistently hyperglycemic

## 2019-12-29 NOTE — CONSULT NOTE ADULT - PROBLEM SELECTOR RECOMMENDATION 3
- LDL goal <70  - Can check fasting AM lipid profile  - Patient has DM so she will benefit from statin if LDL is above the goal.

## 2019-12-29 NOTE — CONSULT NOTE ADULT - ASSESSMENT
33 yo M hx T1DM (Hba1c 9.1%, uncontrolled, on insulin at home), HLD, recurrent foot infection presenting due to weeks of left 5th toe pain and discharge, found to have osteomyelitis s/p left foot partial fifth ray resection on 12/27/19.   Endocrine team consulted for uncontrolled T2DM. 35 yo M hx T1DM (Hba1c 9.1%, uncontrolled, on insulin at home), HLD, recurrent foot infection presenting due to weeks of left 5th toe pain and discharge, found to have osteomyelitis s/p left foot partial fifth ray resection on 12/27/19.   Endocrine team consulted for uncontrolled T1DM.

## 2019-12-29 NOTE — CONSULT NOTE ADULT - PROBLEM SELECTOR RECOMMENDATION 9
- Hba1c 9.1%, uncontrolled  - FS currently ranging in 200s-300s  - Recommend to increase Lantus to 40 units sq qhs  - Increase Humalog to 25 units sq ac TID (hold if NPO)  - Continue low dose Humalog correctional scale before melas and low bedtime scale   - Consistent carb diet  - Monitor FS before meals and at bedtime  - FS goal 100-180  - Nutrition consult   - Counseled about diet, weight loss and exercise  Discharge plan: Patient will be discharged on basal+ bolus( doses TBD), please call endocrine prior to discharge for final recs.  Patient wishes to follow up with [facility]  [address], [address], [place] [address]  [phone]   Will inform office staff to call to schedule appointment with CDE/Nutritionist and MD appointment next available.

## 2019-12-29 NOTE — PROGRESS NOTE ADULT - PROBLEM SELECTOR PLAN 4
EKG with TWI in V2, V5, V6 and Avf  EKH from 2017 - NSR with no TWI   No history of heart disease in self. Mother ( Aged 60) has history of Heart disease and had stents placed a year back   TTE LORENA balderrama tele   Cardiology following, appreciate recs EKG with TWI in V2, V5, V6 and Avf  EKH from 2017 - NSR with no TWI   No history of heart disease in self. Mother ( Aged 60) has history of Heart disease and had stents placed a year back   TTE wnl, no need for tele   Cardiology following, appreciate recs

## 2019-12-29 NOTE — PROGRESS NOTE ADULT - SUBJECTIVE AND OBJECTIVE BOX
Rashaad De La Garza MD  Interventional Cardiology / Endovascular Specialist  [place] Office : [address] [place] [address] [address]  Tel:   [place] Office : [address] [facility] [place] [address]  Tel: [phone]  Cell : [phone]    HISTORY OF PRESENTING ILLNESS:    33 yo M hx T1DM, HLD, recurrent foot infection presenting due to weeks of left 5th toe pain. found to have OM s/p toe amp tolerated the procedure well   	  MEDICATIONS:  heparin  Injectable 5000 Unit(s) SubCutaneous two times a day  cefepime   IVPB 1000 milliGRAM(s) IV Intermittent every 8 hours  vancomycin  IVPB 1250 milliGRAM(s) IV Intermittent every 8 hours  acetaminophen   Tablet .. 325 milliGRAM(s) Oral every 4 hours PRN  dextrose 40% Gel 15 Gram(s) Oral once PRN  dextrose 50% Injectable 12.5 Gram(s) IV Push once  dextrose 50% Injectable 25 Gram(s) IV Push once  dextrose 50% Injectable 25 Gram(s) IV Push once  glucagon  Injectable 1 milliGRAM(s) IntraMuscular once PRN  insulin glargine Injectable (LANTUS) 40 Unit(s) SubCutaneous at bedtime  insulin lispro (HumaLOG) corrective regimen sliding scale   SubCutaneous three times a day before meals  insulin lispro (HumaLOG) corrective regimen sliding scale   SubCutaneous at bedtime  insulin lispro Injectable (HumaLOG) 25 Unit(s) SubCutaneous three times a day before meals  dextrose 5%. 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 No nausea, vomiting, or hematemesis; No diarrhea or constipation. No melena or hematochezia.  GENITOURINARY: No dysuria, frequency, hematuria, or incontinence  NEUROLOGICAL: No headaches, memory loss, loss of strength, numbness, or tremors  ENDOCRINE: No heat or cold intolerance; No hair loss  MUSCULOSKELETAL: No joint pain or swelling; No muscle, back, or extremity pain  PSYCHIATRIC: No depression, anxiety, mood swings, or difficulty sleeping  HEME/LYMPH: No easy bruising, or bleeding gums  All others negative    PHYSICAL EXAM:  T(C): 37.2 (12-29-19 @ 14:14), Max: 37.2 (12-29-19 @ 14:14)  HR: 84 (12-29-19 @ 14:14) (84 - 92)  BP: 107/67 (12-29-19 @ 14:14) (107/67 - 123/69)  RR: 17 (12-29-19 @ 14:14) (17 - 17)  SpO2: 98% (12-29-19 @ 14:14) (96% - 98%)  Wt(kg): --  I&O's Summary      GENERAL: NAD, well-groomed, well-developed  EYES: EOMI, PERRLA, conjunctiva and sclera clear  ENMT: No tonsillar erythema, exudates, or enlargement; Moist mucous membranes, Good dentition, No lesions  Cardiovascular: Normal S1 S2, No JVD, No murmurs, No edema  Respiratory: Lungs clear to auscultation	  Gastrointestinal:  Soft, Non-tender, + BS	  Extremities: Normal range of motion, No clubbing, cyanosis or edema  LYMPH: No lymphadenopathy noted  NERVOUS SYSTEM:  Alert & Oriented X3, Good concentration; Motor Strength 5/5 B/L upper and lower extremities; DTRs 2+ intact and symmetric                                13.8   8.46  )-----------( 314      ( 29 Dec 2019 06:58 )             42.3     12-29    134<L>  |  95<L>  |  19  ----------------------------<  349<H>  4.0   |  24  |  0.70    Ca    9.3      29 Dec 2019 06:58  Mg     1.9     12-29      proBNP:   Lipid Profile:   HgA1c: Hemoglobin A1C, Whole Blood: 9.1 % (12-29 @ 06:58)    TSH:     Consultant(s) Notes Reviewed:  [x ] YES  [ ] NO    Care Discussed with Consultants/Other Providers [ x] YES  [ ] NO    Imaging Personally Reviewed independently:  [x] YES  [ ] NO    All labs, radiologic studies, vitals, orders and medications list reviewed. Patient is seen and examined at bedside. Case discussed with medical team.

## 2019-12-29 NOTE — PROGRESS NOTE ADULT - SUBJECTIVE AND OBJECTIVE BOX
CC: Patient is a 34y old  Male who presents with a chief complaint of Osteomyelitis (29 Dec 2019 09:17)    ID following for left 5th toe OM    Interval History/ROS: Patient feels well. Occasional pain in surgical site. Intact with sutures in place. No fevers, no chills.    Rest of ROS negative.    Allergies  No Known Allergies    ANTIMICROBIALS:  cefepime   IVPB 1000 every 8 hours  vancomycin  IVPB 1250 every 8 hours    OTHER MEDS:  acetaminophen   Tablet .. 325 milliGRAM(s) Oral every 4 hours PRN  dextrose 40% Gel 15 Gram(s) Oral once PRN  dextrose 5%. 1000 milliLiter(s) IV Continuous <Continuous>  dextrose 50% Injectable 12.5 Gram(s) IV Push once  dextrose 50% Injectable 25 Gram(s) IV Push once  dextrose 50% Injectable 25 Gram(s) IV Push once  glucagon  Injectable 1 milliGRAM(s) IntraMuscular once PRN  heparin  Injectable 5000 Unit(s) SubCutaneous two times a day  influenza   Vaccine 0.5 milliLiter(s) IntraMuscular once  insulin glargine Injectable (LANTUS) 35 Unit(s) SubCutaneous at bedtime  insulin lispro (HumaLOG) corrective regimen sliding scale   SubCutaneous three times a day before meals  insulin lispro (HumaLOG) corrective regimen sliding scale   SubCutaneous at bedtime  insulin lispro Injectable (HumaLOG) 22 Unit(s) SubCutaneous three times a day before meals    PE:    Vital Signs Last 24 Hrs  T(C): 37.1 (29 Dec 2019 05:27), Max: 37.4 (28 Dec 2019 12:30)  T(F): 98.8 (29 Dec 2019 05:27), Max: 99.3 (28 Dec 2019 12:30)  HR: 92 (29 Dec 2019 05:27) (92 - 95)  BP: 112/64 (29 Dec 2019 05:27) (112/64 - 123/69)  BP(mean): --  RR: 17 (29 Dec 2019 05:27) (17 - 18)  SpO2: 98% (29 Dec 2019 05:27) (96% - 98%)    Gen: AOx3, NAD, non-toxic  CV: S1+S2 normal, no murmurs  Resp: Clear bilat, no resp distress  Abd: Soft, nontender, +BS  Ext: Left foot s/p 5th toe amputation, site intact, with some bleeding, sutures in place, wound closed.  : No Velazquez  IV/Skin: No thrombophlebitis  Neuro: no focal deficits    LABS:                          13.8   8.46  )-----------( 314      ( 29 Dec 2019 06:58 )             42.3       12-29    134<L>  |  95<L>  |  19  ----------------------------<  349<H>  4.0   |  24  |  0.70    Ca    9.3      29 Dec 2019 06:58  Mg     1.9     12-29    MICROBIOLOGY:  Vancomycin Level, Trough: 16.2 ug/mL (12-29-19 @ 06:58)  v  BONE  12-27-19 --  --  --      BLOOD  12-25-19 --  --  --      OTHER  12-25-19   MANY  Routine susceptibility testing not performed as  Streptococcus Grp A/B is susceptible to drug(s) of choice  (Penicillin and Ampicillin).  TESTS: S. PYOGENES- (GROUP A )  CALLED TO: [id number]-[id number] NEAL RN/YES  READ BACK (2 Patient Identifiers) (Y/N): Y  READ BACK VALUES (Y/N): Y  DATE / TIME CALLED: 12/28/19 0957  CALLED BY: ARNALDO MASCORRO^Streptococcus pyogenes (Gp A)  STCN^Staphylococcus sp.,coag neg  --  --    RADIOLOGY:    < from: Xray Foot AP + Lateral + Oblique, Left (12.27.19 @ 14:39) >  IMPRESSION:  Status post left 5th toe and partial 5th metatarsal head resection with sharp smooth and beveled osseous stump margin and with postsurgical change in the overlying soft tissues.    No proximally tracking gas collections and no focal areas of osteomyelitis.    Remainder of foot unchanged.    Correlate with intraoperative findings.    < end of copied text >

## 2019-12-29 NOTE — PROGRESS NOTE ADULT - PROBLEM SELECTOR PLAN 1
Pt meets sepsis criteria with Fever of 101.5 and HR  > 100, 2/2 OM  Vanc/Cefepime, Monitor vanc trough   Continue Tylenol   On  IVF   Blood cultures- NGTD   Wound cultures- GPC   ID following 2/2 OM, resolved   Vanc/Cefepime, Monitor vanc trough --> can transition to PO abx for 5-7 days if path comes back with clean margins per ID   Blood cultures- NGTD   Wound cultures- GPC   Bone cx - NGTD

## 2019-12-29 NOTE — PROGRESS NOTE ADULT - SUBJECTIVE AND OBJECTIVE BOX
Patient is a 34y old  Male who presents with a chief complaint of Osteomyelitis (29 Dec 2019 09:17)       INTERVAL HPI/OVERNIGHT EVENTS:  Patient seen and evaluated at bedside.  Pt is resting comfortable in NAD. Denies N/V/F/C.      Allergies    No Known Allergies    Intolerances        Vital Signs Last 24 Hrs  T(C): 37.1 (29 Dec 2019 05:27), Max: 37.4 (28 Dec 2019 12:30)  T(F): 98.8 (29 Dec 2019 05:27), Max: 99.3 (28 Dec 2019 12:30)  HR: 92 (29 Dec 2019 05:27) (92 - 95)  BP: 112/64 (29 Dec 2019 05:27) (112/64 - 123/69)  BP(mean): --  RR: 17 (29 Dec 2019 05:27) (17 - 18)  SpO2: 98% (29 Dec 2019 05:27) (96% - 98%)    LABS:                        13.8   8.46  )-----------( 314      ( 29 Dec 2019 06:58 )             42.3     12-29    134<L>  |  95<L>  |  19  ----------------------------<  349<H>  4.0   |  24  |  0.70    Ca    9.3      29 Dec 2019 06:58  Mg     1.9     12-29          CAPILLARY BLOOD GLUCOSE      POCT Blood Glucose.: 278 mg/dL (29 Dec 2019 08:40)  POCT Blood Glucose.: 355 mg/dL (28 Dec 2019 21:52)  POCT Blood Glucose.: 174 mg/dL (28 Dec 2019 17:11)  POCT Blood Glucose.: 277 mg/dL (28 Dec 2019 12:27)      Lower Extremity Physical Exam:  Vascular: DP/PT 1/4 R, non palpable L, CFT <3 seconds B/L- except for L 5th digit which was sluggish, Temperature gradient warm to cool B/L  Neuro: Epicritic sensation diminished to the level of toes B/L  Musculoskeletal/Ortho: R foot 5th digit amp  Skin: L foot: dusky 5th digit, 5th digit fibrogranular wound bed down to bone, scant purulence, no malodor, no fluctuance, no crepitus, diffuse edema throughout L foot      now s/p L foot partial fifth ray resection (DOS 12/27/19)  - POD #2  - Sutures intact, no dehiscence, stable w/ no acute signs of infection, streaking resolved w/ mild john-incisional erythema

## 2019-12-29 NOTE — PROGRESS NOTE ADULT - PROBLEM SELECTOR PLAN 2
s/p L foot partial fifth ray resection on 12/27   -C/w vanc (increased to 1250 on 12/28 for trough of 10.9) and cefepime, ID following   -f/u OR cultures and narrow abx   -Superficial wound cultures- GPC in cocci   -Prior wound cx w/MRSA  WBAT in sx shoe to the left foot s/p L foot partial fifth ray resection on 12/27   -C/w vanc, monitor trough  -C/w cefepime, ID following   -f/u OR cultures and narrow abx   -Superficial wound cultures- GPC in cocci   -Prior wound cx w/MRSA  -Bone cx NGTD  -WBAT in sx shoe to the left foot

## 2019-12-29 NOTE — CONSULT NOTE ADULT - SUBJECTIVE AND OBJECTIVE BOX
HPI:  35 yo M hx T1DM, HLD, recurrent foot infection presenting due to weeks of left 5th toe pain. Patient reported foot has been swelling more than usual and bleeding. He also noticed occasional numbness and tingling of the toe. No hx of amputation or other procedure on the affected foot. Patient uses tylenol and aleve for pain at home. Pain is so severe that is having difficulty walking on the toe. Of note, patient was admitted to CDU in Sept 2019 for similar complaints. Received IV abx and discharged with podiatry f/u. Prior wound culture hx of MRSA positive results. (25 Dec 2019 22:54)      Endocrine history:  Patient states that he was diagnosed with DMT1 at age 6 and has been on insulin since then. Follows with PCP, was seen once by CDE at [facility] in 2017 after his hospital discharge but since then never came to follow up. States his PCP have been asking him to follow up with endocrine but he has been postponing for no reason. States he currently takes Lantus 40 units sq qhs, Humalog 25 units sq ac TID. Checks FS once a day but not daily, usually ranges in low to high 200s. Very rarely low in 70s. Does not report getting hypoglycemic symptoms. Tries to avoid carbs but eats junk food once in a while. Does not exercise. Reports having DM retinopathy in both the eyes, s/p retinal surgeries in both eyes in 2017. Last opthalmology visit was April 2019. Does report neuropathy, said gabapentin in past and was helping her but also made him drowsy. Denies history of nephropathy. No reported macrovascular complications. Has foot infection in right foot in 2017, s/p rt 5th toe amputation. Now with left toe osteomyelitis s/p left foot partial fifth ray resection.     Blood sugars noted to be ranging in 200s-300s since admission. Team has been up titrating his insulin regimen but still uncontrolled.       PAST MEDICAL & SURGICAL HISTORY:  Diabetic retinopathy  Diabetes: ~ diagnosed at 7 years of age  History of appendectomy  H/O eye surgery: ~ left, due to Retinopathy  H/O amputation of lesser toe, right: ~ 5th      FAMILY HISTORY:  Family history of diabetes mellitus: both parents and grandparents       Social History:  .  Lives with roommates.  [place]. Works as a   Active smoker (~ < 0.5 ppd) x 18 years  No history of alcohol or illicit drug use      Outpatient Medications:  · 	Cleocin HCl 300 mg oral capsule: 1 cap(s) orally 3 times a day   · 	doxycycline hyclate 100 mg oral capsule: 1 cap(s) orally 2 times a day  · 	lactobacillus acidophilus oral capsule: 1 tab(s) orally once a day  · 	Lantus Solostar Pen 100 units/mL subcutaneous solution: 40 unit(s) subcutaneous once a day (at bedtime)  · 	HumaLOG KwikPen 100 units/mL subcutaneous solution: 25 units subcutaneous before each meals  · 	acetaminophen 325 mg oral tablet: 2 tab(s) orally every 6 hours, As needed, Mild Pain (1 - 3)        MEDICATIONS  (STANDING):  cefepime   IVPB 1000 milliGRAM(s) IV Intermittent every 8 hours  dextrose 5%. 1000 milliLiter(s) (50 mL/Hr) IV Continuous <Continuous>  dextrose 50% Injectable 12.5 Gram(s) IV Push once  dextrose 50% Injectable 25 Gram(s) IV Push once  dextrose 50% Injectable 25 Gram(s) IV Push once  heparin  Injectable 5000 Unit(s) SubCutaneous two times a day  influenza   Vaccine 0.5 milliLiter(s) IntraMuscular once  insulin glargine Injectable (LANTUS) 35 Unit(s) SubCutaneous at bedtime  insulin lispro (HumaLOG) corrective regimen sliding scale   SubCutaneous three times a day before meals  insulin lispro (HumaLOG) corrective regimen sliding scale   SubCutaneous at bedtime  insulin lispro Injectable (HumaLOG) 22 Unit(s) SubCutaneous three times a day before meals  vancomycin  IVPB 1250 milliGRAM(s) IV Intermittent every 8 hours    MEDICATIONS  (PRN):  acetaminophen   Tablet .. 325 milliGRAM(s) Oral every 4 hours PRN Moderate Pain (4 - 6), Severe Pain (7 - 10)  dextrose 40% Gel 15 Gram(s) Oral once PRN Blood Glucose LESS THAN 70 milliGRAM(s)/deciliter  glucagon  Injectable 1 milliGRAM(s) IntraMuscular once PRN Glucose LESS THAN 70 milligrams/deciliter      Allergies  No Known Allergies      Review of Systems:  Constitutional: No fever, good appetite/po intake  Eyes: No blurry vision, diplopia  Neuro: No tremors  HEENT: No pain  Cardiovascular: No chest pain, palpitations  Respiratory: No SOB, no cough  GI: No nausea, vomiting or abdominal pain   : No dysuria, hematuria  Skin: left foot wound with discharge.  Psych: no depression  Endocrine: no polyuria, polydipsia    ALL OTHER SYSTEMS REVIEWED AND NEGATIVE      PHYSICAL EXAM:  VITALS: T(C): 37.2 (12-29-19 @ 14:14)  T(F): 99 (12-29-19 @ 14:14), Max: 99 (12-29-19 @ 14:14)  HR: 84 (12-29-19 @ 14:14) (84 - 92)  BP: 107/67 (12-29-19 @ 14:14) (107/67 - 123/69)  RR:  (17 - 17)  SpO2:  (96% - 98%)  Wt(kg): --  GENERAL: NAD, well-groomed, well-developed  EYES: No proptosis, anicteric  HEENT:  Atraumatic, Normocephalic, moist mucous membranes  THYROID: Normal size, no palpable nodules  RESPIRATORY: Clear to auscultation bilaterally; No rales, rhonchi, wheezing, or rubs  CARDIOVASCULAR: Regular rate and rhythm; No murmurs; no peripheral edema  GI: Soft, nontender, non distended, normal bowel sounds  SKIN: Dry, intact, No rashes or lesions  NEURO: AAO x 3, no gross or focal deficit   PSYCH: reactive affect, euthymic mood      POCT Blood Glucose.: 325 mg/dL (12-29-19 @ 12:03)  POCT Blood Glucose.: 310 mg/dL (12-29-19 @ 12:02)  POCT Blood Glucose.: 278 mg/dL (12-29-19 @ 08:40)  POCT Blood Glucose.: 355 mg/dL (12-28-19 @ 21:52)  POCT Blood Glucose.: 174 mg/dL (12-28-19 @ 17:11)  POCT Blood Glucose.: 277 mg/dL (12-28-19 @ 12:27)  POCT Blood Glucose.: 212 mg/dL (12-28-19 @ 09:05)  POCT Blood Glucose.: 238 mg/dL (12-27-19 @ 21:26)  POCT Blood Glucose.: 255 mg/dL (12-27-19 @ 19:37)  POCT Blood Glucose.: 252 mg/dL (12-27-19 @ 16:40)  POCT Blood Glucose.: 266 mg/dL (12-27-19 @ 11:10)  POCT Blood Glucose.: 240 mg/dL (12-27-19 @ 07:24)  POCT Blood Glucose.: 239 mg/dL (12-26-19 @ 21:40)                            13.8   8.46  )-----------( 314      ( 29 Dec 2019 06:58 )             42.3       12-29    134<L>  |  95<L>  |  19  ----------------------------<  349<H>  4.0   |  24  |  0.70    EGFR if : 141  EGFR if non : 121    Ca    9.3      12-29  Mg     1.9     12-29  Phos  2.7     12-27      Hemoglobin A1C, Whole Blood: 9.1 % <H> [4.0 - 5.6] (12-29-19 @ 06:58)

## 2019-12-29 NOTE — PROGRESS NOTE ADULT - SUBJECTIVE AND OBJECTIVE BOX
[facility] Division of Hospital Medicine  Mary Ngo DO  Pager (M-F, 6P-5P): [phone]      Patient is a 34y old  Male who presents with a chief complaint of Osteomyelitis (28 Dec 2019 17:42)      SUBJECTIVE / OVERNIGHT EVENTS:  ADDITIONAL REVIEW OF SYSTEMS:    MEDICATIONS  (STANDING):  cefepime   IVPB 1000 milliGRAM(s) IV Intermittent every 8 hours  dextrose 5%. 1000 milliLiter(s) (50 mL/Hr) IV Continuous <Continuous>  dextrose 50% Injectable 12.5 Gram(s) IV Push once  dextrose 50% Injectable 25 Gram(s) IV Push once  dextrose 50% Injectable 25 Gram(s) IV Push once  heparin  Injectable 5000 Unit(s) SubCutaneous two times a day  influenza   Vaccine 0.5 milliLiter(s) IntraMuscular once  insulin glargine Injectable (LANTUS) 35 Unit(s) SubCutaneous at bedtime  insulin lispro (HumaLOG) corrective regimen sliding scale   SubCutaneous three times a day before meals  insulin lispro (HumaLOG) corrective regimen sliding scale   SubCutaneous at bedtime  insulin lispro Injectable (HumaLOG) 22 Unit(s) SubCutaneous three times a day before meals  vancomycin  IVPB 1250 milliGRAM(s) IV Intermittent every 8 hours    MEDICATIONS  (PRN):  acetaminophen   Tablet .. 325 milliGRAM(s) Oral every 4 hours PRN Moderate Pain (4 - 6), Severe Pain (7 - 10)  dextrose 40% Gel 15 Gram(s) Oral once PRN Blood Glucose LESS THAN 70 milliGRAM(s)/deciliter  glucagon  Injectable 1 milliGRAM(s) IntraMuscular once PRN Glucose LESS THAN 70 milligrams/deciliter      CAPILLARY BLOOD GLUCOSE      POCT Blood Glucose.: 278 mg/dL (29 Dec 2019 08:40)  POCT Blood Glucose.: 355 mg/dL (28 Dec 2019 21:52)  POCT Blood Glucose.: 174 mg/dL (28 Dec 2019 17:11)  POCT Blood Glucose.: 277 mg/dL (28 Dec 2019 12:27)    I&O's Summary      PHYSICAL EXAM:  Vital Signs Last 24 Hrs  T(C): 37.1 (29 Dec 2019 05:27), Max: 37.4 (28 Dec 2019 12:30)  T(F): 98.8 (29 Dec 2019 05:27), Max: 99.3 (28 Dec 2019 12:30)  HR: 92 (29 Dec 2019 05:27) (92 - 95)  BP: 112/64 (29 Dec 2019 05:27) (112/64 - 123/69)  BP(mean): --  RR: 17 (29 Dec 2019 05:27) (17 - 18)  SpO2: 98% (29 Dec 2019 05:27) (96% - 98%)  CONSTITUTIONAL: NAD, well-developed, well-groomed  EYES: EOMI; conjunctiva and sclera clear  ENMT: Moist oral mucosa, no pharyngeal injection or exudates; normal dentition  NECK: Supple, no palpable masses; no thyromegaly  RESPIRATORY: Normal respiratory effort; lungs are clear to auscultation bilaterally  CARDIOVASCULAR: Regular rate and rhythm, normal S1 and S2, no murmur/rub/gallop; No lower extremity edema; Peripheral pulses are 2+ bilaterally  ABDOMEN: Nontender to palpation, normoactive bowel sounds, no rebound/guarding; No hepatosplenomegaly  MUSKULOSKELETAL:  no clubbing or cyanosis of digits; no joint swelling or tenderness to palpation  PSYCH: A+O to person, place, and time; affect appropriate  NEUROLOGY: CN 2-12 are intact and symmetric; no gross sensory deficits;   SKIN: No rashes; no palpable lesions    LABS:                        13.8   8.46  )-----------( 314      ( 29 Dec 2019 06:58 )             42.3     12-29    134<L>  |  95<L>  |  19  ----------------------------<  349<H>  4.0   |  24  |  0.70    Ca    9.3      29 Dec 2019 06:58  Mg     1.9     12-29                Culture - Tissue with Gram Stain (collected 27 Dec 2019 15:45)  Source: BONE  Preliminary Report (29 Dec 2019 09:06):    NO ORGANISMS ISOLATED AT 24 HOURS        RADIOLOGY & ADDITIONAL TESTS:  Results Reviewed:   Imaging Personally Reviewed:  Electrocardiogram Personally Reviewed:    COORDINATION OF CARE:  Care Discussed with Consultants/Other Providers [Y/N]:  Prior or Outpatient Records Reviewed [Y/N]: [facility] Division of Hospital Medicine  Merasonu NgoDO  Pager (M-F, 1L-5P): [phone]      Patient is a 34y old  Male who presents with a chief complaint of Osteomyelitis (28 Dec 2019 17:42)      SUBJECTIVE / OVERNIGHT EVENTS: Hyperglycemic last night and this morning, pt feels well, but is concerned about his BS  ADDITIONAL REVIEW OF SYSTEMS: no cp, sob, fevers, chills, foot pain     MEDICATIONS  (STANDING):  cefepime   IVPB 1000 milliGRAM(s) IV Intermittent every 8 hours  dextrose 5%. 1000 milliLiter(s) (50 mL/Hr) IV Continuous <Continuous>  dextrose 50% Injectable 12.5 Gram(s) IV Push once  dextrose 50% Injectable 25 Gram(s) IV Push once  dextrose 50% Injectable 25 Gram(s) IV Push once  heparin  Injectable 5000 Unit(s) SubCutaneous two times a day  influenza   Vaccine 0.5 milliLiter(s) IntraMuscular once  insulin glargine Injectable (LANTUS) 35 Unit(s) SubCutaneous at bedtime  insulin lispro (HumaLOG) corrective regimen sliding scale   SubCutaneous three times a day before meals  insulin lispro (HumaLOG) corrective regimen sliding scale   SubCutaneous at bedtime  insulin lispro Injectable (HumaLOG) 22 Unit(s) SubCutaneous three times a day before meals  vancomycin  IVPB 1250 milliGRAM(s) IV Intermittent every 8 hours    MEDICATIONS  (PRN):  acetaminophen   Tablet .. 325 milliGRAM(s) Oral every 4 hours PRN Moderate Pain (4 - 6), Severe Pain (7 - 10)  dextrose 40% Gel 15 Gram(s) Oral once PRN Blood Glucose LESS THAN 70 milliGRAM(s)/deciliter  glucagon  Injectable 1 milliGRAM(s) IntraMuscular once PRN Glucose LESS THAN 70 milligrams/deciliter      CAPILLARY BLOOD GLUCOSE      POCT Blood Glucose.: 278 mg/dL (29 Dec 2019 08:40)  POCT Blood Glucose.: 355 mg/dL (28 Dec 2019 21:52)  POCT Blood Glucose.: 174 mg/dL (28 Dec 2019 17:11)  POCT Blood Glucose.: 277 mg/dL (28 Dec 2019 12:27)    I&O's Summary      PHYSICAL EXAM:  Vital Signs Last 24 Hrs  T(C): 37.1 (29 Dec 2019 05:27), Max: 37.4 (28 Dec 2019 12:30)  T(F): 98.8 (29 Dec 2019 05:27), Max: 99.3 (28 Dec 2019 12:30)  HR: 92 (29 Dec 2019 05:27) (92 - 95)  BP: 112/64 (29 Dec 2019 05:27) (112/64 - 123/69)  BP(mean): --  RR: 17 (29 Dec 2019 05:27) (17 - 18)  SpO2: 98% (29 Dec 2019 05:27) (96% - 98%)  CONSTITUTIONAL: NAD, sitting in bed   EYES: EOMI; conjunctiva and sclera clear  ENMT: Moist oral mucosa  NECK: Supple  RESPIRATORY: Normal respiratory effort; lungs are clear to auscultation bilaterally  CARDIOVASCULAR: Regular rate and rhythm, normal S1 and S2, no murmur/rub/gallop  ABDOMEN: obese, Nontender to palpation, normoactive bowel sounds, no rebound/guarding  MUSKULOSKELETAL:  no clubbing or cyanosis of digits; no joint swelling or tenderness to palpation, +R foot wrapped in ace bandage  PSYCH: A+O to person, place, and time; affect appropriate  NEUROLOGY: CN 2-12 are intact and symmetric; no gross sensory deficits;     LABS:                        13.8   8.46  )-----------( 314      ( 29 Dec 2019 06:58 )             42.3     12-29    134<L>  |  95<L>  |  19  ----------------------------<  349<H>  4.0   |  24  |  0.70    Ca    9.3      29 Dec 2019 06:58  Mg     1.9     12-29                Culture - Tissue with Gram Stain (collected 27 Dec 2019 15:45)  Source: BONE  Preliminary Report (29 Dec 2019 09:06):    NO ORGANISMS ISOLATED AT 24 HOURS        RADIOLOGY & ADDITIONAL TESTS:  Results Reviewed:   Imaging Personally Reviewed:  Electrocardiogram Personally Reviewed:    COORDINATION OF CARE:  Care Discussed with Consultants/Other Providers [Y/N]: Y  Prior or Outpatient Records Reviewed [Y/N]: Y

## 2019-12-30 LAB
ANION GAP SERPL CALC-SCNC: 12 MMO/L — SIGNIFICANT CHANGE UP (ref 7–14)
BACTERIA BLD CULT: SIGNIFICANT CHANGE UP
BACTERIA BLD CULT: SIGNIFICANT CHANGE UP
BUN SERPL-MCNC: 17 MG/DL — SIGNIFICANT CHANGE UP (ref 7–23)
CALCIUM SERPL-MCNC: 9.6 MG/DL — SIGNIFICANT CHANGE UP (ref 8.4–10.5)
CHLORIDE SERPL-SCNC: 96 MMOL/L — LOW (ref 98–107)
CHOLEST SERPL-MCNC: 225 MG/DL — HIGH (ref 120–199)
CO2 SERPL-SCNC: 25 MMOL/L — SIGNIFICANT CHANGE UP (ref 22–31)
CREAT SERPL-MCNC: 0.64 MG/DL — SIGNIFICANT CHANGE UP (ref 0.5–1.3)
GLUCOSE BLDC GLUCOMTR-MCNC: 113 MG/DL — HIGH (ref 70–99)
GLUCOSE BLDC GLUCOMTR-MCNC: 121 MG/DL — HIGH (ref 70–99)
GLUCOSE BLDC GLUCOMTR-MCNC: 270 MG/DL — HIGH (ref 70–99)
GLUCOSE BLDC GLUCOMTR-MCNC: 290 MG/DL — HIGH (ref 70–99)
GLUCOSE SERPL-MCNC: 336 MG/DL — HIGH (ref 70–99)
HCT VFR BLD CALC: 41.8 % — SIGNIFICANT CHANGE UP (ref 39–50)
HDLC SERPL-MCNC: 37 MG/DL — SIGNIFICANT CHANGE UP (ref 35–55)
HGB BLD-MCNC: 13.8 G/DL — SIGNIFICANT CHANGE UP (ref 13–17)
LIPID PNL WITH DIRECT LDL SERPL: 156 MG/DL — SIGNIFICANT CHANGE UP
MAGNESIUM SERPL-MCNC: 1.7 MG/DL — SIGNIFICANT CHANGE UP (ref 1.6–2.6)
MCHC RBC-ENTMCNC: 28.2 PG — SIGNIFICANT CHANGE UP (ref 27–34)
MCHC RBC-ENTMCNC: 33 % — SIGNIFICANT CHANGE UP (ref 32–36)
MCV RBC AUTO: 85.5 FL — SIGNIFICANT CHANGE UP (ref 80–100)
NRBC # FLD: 0 K/UL — SIGNIFICANT CHANGE UP (ref 0–0)
PLATELET # BLD AUTO: 333 K/UL — SIGNIFICANT CHANGE UP (ref 150–400)
PMV BLD: 9.6 FL — SIGNIFICANT CHANGE UP (ref 7–13)
POTASSIUM SERPL-MCNC: 4.1 MMOL/L — SIGNIFICANT CHANGE UP (ref 3.5–5.3)
POTASSIUM SERPL-SCNC: 4.1 MMOL/L — SIGNIFICANT CHANGE UP (ref 3.5–5.3)
RBC # BLD: 4.89 M/UL — SIGNIFICANT CHANGE UP (ref 4.2–5.8)
RBC # FLD: 12.3 % — SIGNIFICANT CHANGE UP (ref 10.3–14.5)
SODIUM SERPL-SCNC: 133 MMOL/L — LOW (ref 135–145)
SPECIMEN SOURCE: SIGNIFICANT CHANGE UP
TRIGL SERPL-MCNC: 184 MG/DL — HIGH (ref 10–149)
VANCOMYCIN TROUGH SERPL-MCNC: 11.6 UG/ML — SIGNIFICANT CHANGE UP (ref 10–20)
WBC # BLD: 8.91 K/UL — SIGNIFICANT CHANGE UP (ref 3.8–10.5)
WBC # FLD AUTO: 8.91 K/UL — SIGNIFICANT CHANGE UP (ref 3.8–10.5)

## 2019-12-30 PROCEDURE — 99233 SBSQ HOSP IP/OBS HIGH 50: CPT

## 2019-12-30 PROCEDURE — 99232 SBSQ HOSP IP/OBS MODERATE 35: CPT

## 2019-12-30 RX ORDER — ATORVASTATIN CALCIUM 80 MG/1
40 TABLET, FILM COATED ORAL AT BEDTIME
Refills: 0 | Status: DISCONTINUED | OUTPATIENT
Start: 2019-12-30 | End: 2019-12-31

## 2019-12-30 RX ORDER — INSULIN LISPRO 100/ML
VIAL (ML) SUBCUTANEOUS AT BEDTIME
Refills: 0 | Status: DISCONTINUED | OUTPATIENT
Start: 2019-12-30 | End: 2019-12-31

## 2019-12-30 RX ORDER — INSULIN GLARGINE 100 [IU]/ML
43 INJECTION, SOLUTION SUBCUTANEOUS AT BEDTIME
Refills: 0 | Status: DISCONTINUED | OUTPATIENT
Start: 2019-12-30 | End: 2019-12-31

## 2019-12-30 RX ORDER — INSULIN LISPRO 100/ML
28 VIAL (ML) SUBCUTANEOUS
Refills: 0 | Status: DISCONTINUED | OUTPATIENT
Start: 2019-12-30 | End: 2019-12-30

## 2019-12-30 RX ORDER — INSULIN LISPRO 100/ML
28 VIAL (ML) SUBCUTANEOUS
Refills: 0 | Status: DISCONTINUED | OUTPATIENT
Start: 2019-12-30 | End: 2019-12-31

## 2019-12-30 RX ORDER — CEFAZOLIN SODIUM 1 G
2000 VIAL (EA) INJECTION EVERY 8 HOURS
Refills: 0 | Status: DISCONTINUED | OUTPATIENT
Start: 2019-12-30 | End: 2019-12-31

## 2019-12-30 RX ORDER — INSULIN LISPRO 100/ML
VIAL (ML) SUBCUTANEOUS
Refills: 0 | Status: DISCONTINUED | OUTPATIENT
Start: 2019-12-30 | End: 2019-12-31

## 2019-12-30 RX ADMIN — CEFEPIME 100 MILLIGRAM(S): 1 INJECTION, POWDER, FOR SOLUTION INTRAMUSCULAR; INTRAVENOUS at 09:51

## 2019-12-30 RX ADMIN — Medication 28 UNIT(S): at 18:10

## 2019-12-30 RX ADMIN — Medication 25 UNIT(S): at 09:16

## 2019-12-30 RX ADMIN — ATORVASTATIN CALCIUM 40 MILLIGRAM(S): 80 TABLET, FILM COATED ORAL at 22:27

## 2019-12-30 RX ADMIN — Medication 25 UNIT(S): at 12:53

## 2019-12-30 RX ADMIN — INSULIN GLARGINE 43 UNIT(S): 100 INJECTION, SOLUTION SUBCUTANEOUS at 22:24

## 2019-12-30 RX ADMIN — Medication 166.67 MILLIGRAM(S): at 11:04

## 2019-12-30 RX ADMIN — Medication 3: at 12:53

## 2019-12-30 RX ADMIN — Medication 166.67 MILLIGRAM(S): at 00:27

## 2019-12-30 RX ADMIN — Medication 3: at 09:16

## 2019-12-30 RX ADMIN — HEPARIN SODIUM 5000 UNIT(S): 5000 INJECTION INTRAVENOUS; SUBCUTANEOUS at 18:11

## 2019-12-30 RX ADMIN — CEFEPIME 100 MILLIGRAM(S): 1 INJECTION, POWDER, FOR SOLUTION INTRAMUSCULAR; INTRAVENOUS at 01:48

## 2019-12-30 RX ADMIN — Medication 100 MILLIGRAM(S): at 23:24

## 2019-12-30 NOTE — PROGRESS NOTE ADULT - PROBLEM SELECTOR PLAN 4
, HDL 37  -Patient reports being on atorvastatin 20mg qHS at home- will confirm with pharmacy. Will need to increase dose if patient already on atorvastatin

## 2019-12-30 NOTE — DIETITIAN INITIAL EVALUATION ADULT. - OTHER INFO
35 y/o M with T1DM (diagnosed at 5 y/o), HLD presenting with L 5th toe pain-significant fo OM s/p partial ray resection 12/27.  Pt with uncontrolled DM (HbA1c 9.1); pt is adherent to insulin regimen. Pt not following any diet, not counting carbs, SMBG infrequently. Pt states he gained weight over past two years since his two eye surgeries in which it took 2 months to recover from each surgery; he spent most of his day lying down and eating. UBW ~180 pounds; current weight 221 pounds. Pt states he is motivated to make lifestyle changes. Discussed carbohydrate sources, carbohydrate counting, fiber, protein sources, mixed meals, nutrition label reading, meal planning. Encouraged close f/u with endocrinologist and CDE. Pt able to teach back, seemed interested in education, and willing to make changes to food choices.

## 2019-12-30 NOTE — PROGRESS NOTE ADULT - PROBLEM SELECTOR PLAN 2
s/p L foot partial fifth ray resection on 12/27   -C/w vanc and Cefepime, monitor vanc trough  -ID and podiatry following   -f/u OR cultures- no growth to date  -Superficial wound cultures- strep pyogenes  -Bone cx NGTD  -WBAT in sx shoe to the left foot

## 2019-12-30 NOTE — PROGRESS NOTE ADULT - SUBJECTIVE AND OBJECTIVE BOX
Chloe Henriquez  [facility] Medicine  Pager #[phone]    Patient is a 34y old  Male who presents with a chief complaint of Osteomyelitis (30 Dec 2019 12:06)      SUBJECTIVE / OVERNIGHT EVENTS: Patient seen and examined at bedside. Patient reports pain is well controlled in his foot. Reports that his FS at home are usually in the 170s-180s range. Denies any other symptoms.     ADDITIONAL REVIEW OF SYSTEMS:    MEDICATIONS  (STANDING):  cefepime   IVPB 1000 milliGRAM(s) IV Intermittent every 8 hours  dextrose 5%. 1000 milliLiter(s) (50 mL/Hr) IV Continuous <Continuous>  dextrose 50% Injectable 12.5 Gram(s) IV Push once  dextrose 50% Injectable 25 Gram(s) IV Push once  dextrose 50% Injectable 25 Gram(s) IV Push once  heparin  Injectable 5000 Unit(s) SubCutaneous two times a day  influenza   Vaccine 0.5 milliLiter(s) IntraMuscular once  insulin glargine Injectable (LANTUS) 40 Unit(s) SubCutaneous at bedtime  insulin lispro (HumaLOG) corrective regimen sliding scale   SubCutaneous three times a day before meals  insulin lispro (HumaLOG) corrective regimen sliding scale   SubCutaneous at bedtime  insulin lispro Injectable (HumaLOG) 25 Unit(s) SubCutaneous three times a day before meals  vancomycin  IVPB 1250 milliGRAM(s) IV Intermittent every 8 hours    MEDICATIONS  (PRN):  acetaminophen   Tablet .. 325 milliGRAM(s) Oral every 4 hours PRN Moderate Pain (4 - 6), Severe Pain (7 - 10)  dextrose 40% Gel 15 Gram(s) Oral once PRN Blood Glucose LESS THAN 70 milliGRAM(s)/deciliter  glucagon  Injectable 1 milliGRAM(s) IntraMuscular once PRN Glucose LESS THAN 70 milligrams/deciliter      CAPILLARY BLOOD GLUCOSE      POCT Blood Glucose.: 270 mg/dL (30 Dec 2019 12:25)  POCT Blood Glucose.: 290 mg/dL (30 Dec 2019 08:33)  POCT Blood Glucose.: 208 mg/dL (29 Dec 2019 22:08)  POCT Blood Glucose.: 205 mg/dL (29 Dec 2019 17:13)    I&O's Summary    29 Dec 2019 07:01  -  30 Dec 2019 07:00  --------------------------------------------------------  IN: 500 mL / OUT: 320 mL / NET: 180 mL        PHYSICAL EXAM:    Vital Signs Last 24 Hrs  T(C): 37.3 (30 Dec 2019 14:00), Max: 37.3 (29 Dec 2019 21:45)  T(F): 99.2 (30 Dec 2019 14:00), Max: 99.2 (30 Dec 2019 14:00)  HR: 88 (30 Dec 2019 14:00) (85 - 90)  BP: 115/71 (30 Dec 2019 14:00) (115/71 - 123/65)  BP(mean): --  RR: 17 (30 Dec 2019 14:00) (17 - 18)  SpO2: 100% (30 Dec 2019 14:00) (100% - 100%)    CONSTITUTIONAL: NAD, well-developed, well-groomed  EYES: conjunctiva and sclera clear  ENMT: Moist oral mucosa, no pharyngeal injection or exudates  NECK: Supple  RESPIRATORY: Normal respiratory effort; lungs are clear to auscultation bilaterally  CARDIOVASCULAR: Regular rate and rhythm, normal S1 and S2, no murmur/rub/gallop; No lower extremity edema  ABDOMEN: Nontender to palpation, normoactive bowel sounds, no rebound/guarding; No hepatosplenomegaly  MUSCULOSKELETAL: no clubbing or cyanosis of digits; no joint swelling or tenderness to palpation  PSYCH: A+O to person, place, and time; affect appropriate  NEUROLOGY: CN 2-12 are intact and symmetric; no gross sensory deficits   SKIN: Left foot wrapped in ACE bandage    LABS:                        13.8   8.91  )-----------( 333      ( 30 Dec 2019 07:00 )             41.8     12-30    133<L>  |  96<L>  |  17  ----------------------------<  336<H>  4.1   |  25  |  0.64    Ca    9.6      30 Dec 2019 07:00  Mg     1.7     12-30      RADIOLOGY & ADDITIONAL TESTS: No new imaging  Results Reviewed: Yes  Imaging Personally Reviewed:  Electrocardiogram Personally Reviewed:    COORDINATION OF CARE:  Care Discussed with Consultants/Other Providers [Y/N]:  Prior or Outpatient Records Reviewed [Y/N]:

## 2019-12-30 NOTE — PROGRESS NOTE ADULT - PROBLEM SELECTOR PLAN 1
2/2 OM, resolved   Vanc/Cefepime, Monitor vanc trough --> can transition to PO abx for 5-7 days if path comes back with clean margins per ID   Blood cultures- NGTD   Wound cultures- GPC   Bone cx - NGTD

## 2019-12-30 NOTE — PROGRESS NOTE ADULT - SUBJECTIVE AND OBJECTIVE BOX
Chief Complaint: Type 1 DM, uncontrolled    History:  denies n/v, tolerating PO.  No hypoglycemia.     MEDICATIONS  (STANDING):  atorvastatin 40 milliGRAM(s) Oral at bedtime  ceFAZolin   IVPB 2000 milliGRAM(s) IV Intermittent every 8 hours  dextrose 5%. 1000 milliLiter(s) (50 mL/Hr) IV Continuous <Continuous>  dextrose 50% Injectable 12.5 Gram(s) IV Push once  dextrose 50% Injectable 25 Gram(s) IV Push once  dextrose 50% Injectable 25 Gram(s) IV Push once  heparin  Injectable 5000 Unit(s) SubCutaneous two times a day  influenza   Vaccine 0.5 milliLiter(s) IntraMuscular once  insulin glargine Injectable (LANTUS) 40 Unit(s) SubCutaneous at bedtime  insulin lispro (HumaLOG) corrective regimen sliding scale   SubCutaneous three times a day before meals  insulin lispro (HumaLOG) corrective regimen sliding scale   SubCutaneous at bedtime  insulin lispro Injectable (HumaLOG) 25 Unit(s) SubCutaneous three times a day before meals    MEDICATIONS  (PRN):  acetaminophen   Tablet .. 325 milliGRAM(s) Oral every 4 hours PRN Moderate Pain (4 - 6), Severe Pain (7 - 10)  dextrose 40% Gel 15 Gram(s) Oral once PRN Blood Glucose LESS THAN 70 milliGRAM(s)/deciliter  glucagon  Injectable 1 milliGRAM(s) IntraMuscular once PRN Glucose LESS THAN 70 milligrams/deciliter      Allergies    No Known Allergies    Intolerances      Review of Systems:  Constitutional: No fever  Eyes: No blurry vision    ALL OTHER SYSTEMS REVIEWED AND NEGATIVE        PHYSICAL EXAM:  VITALS: T(C): 37.3 (12-30-19 @ 14:00)  T(F): 99.2 (12-30-19 @ 14:00), Max: 99.2 (12-30-19 @ 14:00)  HR: 88 (12-30-19 @ 14:00) (85 - 90)  BP: 115/71 (12-30-19 @ 14:00) (115/71 - 123/65)  RR:  (17 - 18)  SpO2:  (100% - 100%)  Wt(kg): --  GENERAL: NAD, well-groomed, well-developed  GI: Soft, nontender, non distended  SKIN: Dry, intact, No rashes or lesions  PSYCH: Alert and oriented x 3, normal affect, normal mood      CAPILLARY BLOOD GLUCOSE    POCT Blood Glucose.: 270 mg/dL (30 Dec 2019 12:25)  POCT Blood Glucose.: 290 mg/dL (30 Dec 2019 08:33)  POCT Blood Glucose.: 208 mg/dL (29 Dec 2019 22:08)      12-30    133<L>  |  96<L>  |  17  ----------------------------<  336<H>  4.1   |  25  |  0.64    EGFR if : 146  EGFR if non : 126    Ca    9.6      12-30  Mg     1.7     12-30          Hemoglobin A1C, Whole Blood: 9.1 % <H> [4.0 - 5.6] (12-29-19 @ 06:58)

## 2019-12-30 NOTE — PROGRESS NOTE ADULT - PROBLEM SELECTOR PLAN 8
1.  Name of PCP:  Ally Jo  2.  PCP Contacted on Admission: [ X] Y    [x] N  - spoke to Medical assistant on 12/27  3.  PCP contacted at Discharge: [ ] Y    [ ] N    [ ] N/A  4.  Post-Discharge Appointment Date and Location:  5.  Summary of Handoff given to PCP:

## 2019-12-30 NOTE — PROGRESS NOTE ADULT - SUBJECTIVE AND OBJECTIVE BOX
Rashaad De La Garza MD  Interventional Cardiology / Endovascular Specialist  Post Office : [phone] [place] [address].[place]. [address]  Tel:   [place] Office : [address] [facility] [place] [address]  Tel: [phone]  Cell : [phone]    HISTORY OF PRESENTING ILLNESS:    35 yo M hx T1DM, HLD, recurrent foot infection presenting due to weeks of left 5th toe pain. found to have OM s/p toe amp tolerated the procedure well   	  MEDICATIONS:  heparin  Injectable 5000 Unit(s) SubCutaneous two times a day  cefepime   IVPB 1000 milliGRAM(s) IV Intermittent every 8 hours  vancomycin  IVPB 1250 milliGRAM(s) IV Intermittent every 8 hours  acetaminophen   Tablet .. 325 milliGRAM(s) Oral every 4 hours PRN  dextrose 40% Gel 15 Gram(s) Oral once PRN  dextrose 50% Injectable 12.5 Gram(s) IV Push once  dextrose 50% Injectable 25 Gram(s) IV Push once  dextrose 50% Injectable 25 Gram(s) IV Push once  glucagon  Injectable 1 milliGRAM(s) IntraMuscular once PRN  insulin glargine Injectable (LANTUS) 40 Unit(s) SubCutaneous at bedtime  insulin lispro (HumaLOG) corrective regimen sliding scale   SubCutaneous three times a day before meals  insulin lispro (HumaLOG) corrective regimen sliding scale   SubCutaneous at bedtime  insulin lispro Injectable (HumaLOG) 25 Unit(s) SubCutaneous three times a day before meals  dextrose 5%. 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 No nausea, vomiting, or hematemesis; No diarrhea or constipation. No melena or hematochezia.  GENITOURINARY: No dysuria, frequency, hematuria, or incontinence  NEUROLOGICAL: No headaches, memory loss, loss of strength, numbness, or tremors  ENDOCRINE: No heat or cold intolerance; No hair loss  MUSCULOSKELETAL: No joint pain or swelling; No muscle, back, or extremity pain  PSYCHIATRIC: No depression, anxiety, mood swings, or difficulty sleeping  HEME/LYMPH: No easy bruising, or bleeding gums  All others negative    PHYSICAL EXAM:  T(C): 37.2 (12-30-19 @ 05:57), Max: 37.3 (12-29-19 @ 21:45)  HR: 85 (12-30-19 @ 05:57) (84 - 90)  BP: 123/65 (12-30-19 @ 05:57) (107/67 - 123/65)  RR: 18 (12-30-19 @ 05:57) (17 - 18)  SpO2: 100% (12-30-19 @ 05:57) (98% - 100%)  Wt(kg): --  I&O's Summary    29 Dec 2019 07:01  -  30 Dec 2019 07:00  --------------------------------------------------------  IN: 500 mL / OUT: 320 mL / NET: 180 mL      GENERAL: NAD, well-groomed, well-developed  EYES: EOMI, PERRLA, conjunctiva and sclera clear  ENMT: No tonsillar erythema, exudates, or enlargement; Moist mucous membranes, Good dentition, No lesions  Cardiovascular: Normal S1 S2, No JVD, No murmurs, No edema  Respiratory: Lungs clear to auscultation	  Gastrointestinal:  Soft, Non-tender, + BS	  Extremities: Normal range of motion, No clubbing, cyanosis or edema  LYMPH: No lymphadenopathy noted  NERVOUS SYSTEM:  Alert & Oriented X3, Good concentration; Motor Strength 5/5 B/L upper and lower extremities; DTRs 2+ intact and symmetric                            13.8   8.91  )-----------( 333      ( 30 Dec 2019 07:00 )             41.8     12-30    133<L>  |  96<L>  |  17  ----------------------------<  336<H>  4.1   |  25  |  0.64    Ca    9.6      30 Dec 2019 07:00  Mg     1.7     12-30      proBNP:   Lipid Profile:   HgA1c:   TSH:     Consultant(s) Notes Reviewed:  [x ] YES  [ ] NO    Care Discussed with Consultants/Other Providers [ x] YES  [ ] NO    Imaging Personally Reviewed independently:  [x] YES  [ ] NO    All labs, radiologic studies, vitals, orders and medications list reviewed. Patient is seen and examined at bedside. Case discussed with medical team.

## 2019-12-30 NOTE — CHART NOTE - NSCHARTNOTEFT_GEN_A_CORE
s/p left foot partial 5th ray resection. According to OR findings, there is low concerns for residual infection. Clean bone culture has been no growth for 3 days.   Patient is stable to be discharged per podiatry with PO Abx.   Please keep left foot dressing clean, dry and intact till office follow up

## 2019-12-30 NOTE — PROGRESS NOTE ADULT - PROBLEM SELECTOR PLAN 3
Reports taking 35-40U lantus and 24u qac at home, does not have endo followup  A1c 9.1  -Remains hyperglycemic  -C/w Lantus 40U qhs  -C/w Humalog 25U qac  -Endocrine following, appreciate recs  -FS and low dose ISS qac and qHS

## 2019-12-30 NOTE — DIETITIAN INITIAL EVALUATION ADULT. - PROBLEM SELECTOR PLAN 2
- Continue to lantus 25u and premeal 15u while inpatient. (uses 35u lantus and 24u premeals at home)  - F/u FS QID; cover with SSI.

## 2019-12-30 NOTE — PROGRESS NOTE ADULT - SUBJECTIVE AND OBJECTIVE BOX
CC: Patient is a 34y old  Male who presents with a chief complaint of Osteomyelitis (29 Dec 2019 09:17)    ID following for left 5th toe OM    Interval History/ROS: Patient feels well. podiatry OR report and f/u findings appreciated.   No fevers, no chills.    Rest of ROS negative.    Allergies  No Known Allergies    ANTIMICROBIALS: cefepime   IVPB 1000 every 8 hours  vancomycin  IVPB 1250 every 8 hours      OTHER MEDS:  acetaminophen   Tablet .. 325 milliGRAM(s) Oral every 4 hours PRN  dextrose 40% Gel 15 Gram(s) Oral once PRN  dextrose 5%. 1000 milliLiter(s) IV Continuous <Continuous>  dextrose 50% Injectable 12.5 Gram(s) IV Push once  dextrose 50% Injectable 25 Gram(s) IV Push once  dextrose 50% Injectable 25 Gram(s) IV Push once  glucagon  Injectable 1 milliGRAM(s) IntraMuscular once PRN  heparin  Injectable 5000 Unit(s) SubCutaneous two times a day  influenza   Vaccine 0.5 milliLiter(s) IntraMuscular once  insulin glargine Injectable (LANTUS) 35 Unit(s) SubCutaneous at bedtime  insulin lispro (HumaLOG) corrective regimen sliding scale   SubCutaneous three times a day before meals  insulin lispro (HumaLOG) corrective regimen sliding scale   SubCutaneous at bedtime  insulin lispro Injectable (HumaLOG) 22 Unit(s) SubCutaneous three times a day before meals    PE:  Vital Signs Last 24 Hrs  T(F): 99.2 (12-30-19 @ 14:00), Max: 99.2 (12-30-19 @ 14:00)  HR: 88 (12-30-19 @ 14:00)  BP: 115/71 (12-30-19 @ 14:00)  RR: 17 (12-30-19 @ 14:00)  SpO2: 100% (12-30-19 @ 14:00) (100% - 100%)    Gen: AOx3, NAD, non-toxic  CV: S1+S2 normal, no murmurs  Resp: Clear bilat, no resp distress  Abd: Soft, nontender, +BS  Ext: Left foot s/p 5th toe amputation, sutures in place, wound closed.  : No Velazquez  IV/Skin: No thrombophlebitis  Neuro: no focal deficits    LABS:                          13.8   8.91  )-----------( 333      ( 30 Dec 2019 07:00 )             41.8 12-30    133  |  96  |  17  ----------------------------<  336  4.1   |  25  |  0.64  Ca    9.6      30 Dec 2019 07:00Mg     1.7     12-30      MICROBIOLOGY:  Vancomycin Level, Trough: 16.2 ug/mL (12-29-19 @ 06:58)  v  BONE  12-27-19 --  --  --    Culture - Tissue with Gram Stain (12.27.19 @ 15:45)    Gram Stain Wound:   NOS^No Organisms Seen  WBC^White Blood Cells  QNTY CELLS IN GRAM STAIN: RARE (1+)    Culture - Tissue:   NO ORGANISMS ISOLATED AT 24 HOURS  NO ORGANISMS ISOLATED AT 48 HRS.    Specimen Source: BONE      BLOOD  12-25-19 --  --  --      OTHER  12-25-19   MANY  Routine susceptibility testing not performed as  Streptococcus Grp A/B is susceptible to drug(s) of choice  (Penicillin and Ampicillin).  TESTS: S. PYOGENES- (GROUP A )  CALLED TO: 7850-[id number] NEAL RN/YES  READ BACK (2 Patient Identifiers) (Y/N): Y  READ BACK VALUES (Y/N): Y  DATE / TIME CALLED: 12/28/19 0957  CALLED BY: ARNALDO MASCORRO^Streptococcus pyogenes (Gp A)  STCN^Staphylococcus sp.,coag neg  --  --    RADIOLOGY:    < from: Xray Foot AP + Lateral + Oblique, Left (12.27.19 @ 14:39) >  IMPRESSION:  Status post left 5th toe and partial 5th metatarsal head resection with sharp smooth and beveled osseous stump margin and with postsurgical change in the overlying soft tissues.    No proximally tracking gas collections and no focal areas of osteomyelitis.    Remainder of foot unchanged.    Correlate with intraoperative findings.    < end of copied text >

## 2019-12-30 NOTE — DIETITIAN INITIAL EVALUATION ADULT. - ADD RECOMMEND
1. Continue current diet order. 2. Reinforce education as feasible. 3. Encouraged outpatient f/u with CDE, endocrinology for continued f/u.

## 2019-12-30 NOTE — DIETITIAN INITIAL EVALUATION ADULT. - PERTINENT MEDS FT
MEDICATIONS  (STANDING):  cefepime   IVPB 1000 milliGRAM(s) IV Intermittent every 8 hours  dextrose 5%. 1000 milliLiter(s) (50 mL/Hr) IV Continuous <Continuous>  dextrose 50% Injectable 12.5 Gram(s) IV Push once  dextrose 50% Injectable 25 Gram(s) IV Push once  dextrose 50% Injectable 25 Gram(s) IV Push once  heparin  Injectable 5000 Unit(s) SubCutaneous two times a day  influenza   Vaccine 0.5 milliLiter(s) IntraMuscular once  insulin glargine Injectable (LANTUS) 40 Unit(s) SubCutaneous at bedtime  insulin lispro (HumaLOG) corrective regimen sliding scale   SubCutaneous three times a day before meals  insulin lispro (HumaLOG) corrective regimen sliding scale   SubCutaneous at bedtime  insulin lispro Injectable (HumaLOG) 25 Unit(s) SubCutaneous three times a day before meals  vancomycin  IVPB 1250 milliGRAM(s) IV Intermittent every 8 hours

## 2019-12-30 NOTE — DIETITIAN INITIAL EVALUATION ADULT. - PERTINENT LABORATORY DATA
12-30 Na 133 mmol/L<L> Glu 336 mg/dL<H> K+ 4.1 mmol/L Cr 0.64 mg/dL BUN 17 mg/dL Phos n/a    12-29-19 HbA1c 9.1 %  12-30 @ 12:25 POCT 270 mg/dL  12-30 @ 08:33 POCT 290 mg/dL  12-29 @ 22:08 POCT 208 mg/dL  12-29 @ 17:13 POCT 205 mg/dL

## 2019-12-31 ENCOUNTER — TRANSCRIPTION ENCOUNTER (OUTPATIENT)
Age: 34
End: 2019-12-31

## 2019-12-31 VITALS
HEART RATE: 79 BPM | RESPIRATION RATE: 18 BRPM | TEMPERATURE: 98 F | SYSTOLIC BLOOD PRESSURE: 118 MMHG | DIASTOLIC BLOOD PRESSURE: 69 MMHG | OXYGEN SATURATION: 94 %

## 2019-12-31 LAB
ANION GAP SERPL CALC-SCNC: 14 MMO/L — SIGNIFICANT CHANGE UP (ref 7–14)
BUN SERPL-MCNC: 18 MG/DL — SIGNIFICANT CHANGE UP (ref 7–23)
CALCIUM SERPL-MCNC: 9.4 MG/DL — SIGNIFICANT CHANGE UP (ref 8.4–10.5)
CHLORIDE SERPL-SCNC: 95 MMOL/L — LOW (ref 98–107)
CO2 SERPL-SCNC: 22 MMOL/L — SIGNIFICANT CHANGE UP (ref 22–31)
CREAT SERPL-MCNC: 0.63 MG/DL — SIGNIFICANT CHANGE UP (ref 0.5–1.3)
GLUCOSE BLDC GLUCOMTR-MCNC: 231 MG/DL — HIGH (ref 70–99)
GLUCOSE BLDC GLUCOMTR-MCNC: 344 MG/DL — HIGH (ref 70–99)
GLUCOSE SERPL-MCNC: 340 MG/DL — HIGH (ref 70–99)
HCT VFR BLD CALC: 41.2 % — SIGNIFICANT CHANGE UP (ref 39–50)
HGB BLD-MCNC: 13.7 G/DL — SIGNIFICANT CHANGE UP (ref 13–17)
MAGNESIUM SERPL-MCNC: 1.8 MG/DL — SIGNIFICANT CHANGE UP (ref 1.6–2.6)
MCHC RBC-ENTMCNC: 27.9 PG — SIGNIFICANT CHANGE UP (ref 27–34)
MCHC RBC-ENTMCNC: 33.3 % — SIGNIFICANT CHANGE UP (ref 32–36)
MCV RBC AUTO: 83.9 FL — SIGNIFICANT CHANGE UP (ref 80–100)
NRBC # FLD: 0 K/UL — SIGNIFICANT CHANGE UP (ref 0–0)
PHOSPHATE SERPL-MCNC: 3.3 MG/DL — SIGNIFICANT CHANGE UP (ref 2.5–4.5)
PLATELET # BLD AUTO: 355 K/UL — SIGNIFICANT CHANGE UP (ref 150–400)
PMV BLD: 9.1 FL — SIGNIFICANT CHANGE UP (ref 7–13)
POTASSIUM SERPL-MCNC: 4.3 MMOL/L — SIGNIFICANT CHANGE UP (ref 3.5–5.3)
POTASSIUM SERPL-SCNC: 4.3 MMOL/L — SIGNIFICANT CHANGE UP (ref 3.5–5.3)
RBC # BLD: 4.91 M/UL — SIGNIFICANT CHANGE UP (ref 4.2–5.8)
RBC # FLD: 12.3 % — SIGNIFICANT CHANGE UP (ref 10.3–14.5)
SODIUM SERPL-SCNC: 131 MMOL/L — LOW (ref 135–145)
VANCOMYCIN TROUGH SERPL-MCNC: 5.1 UG/ML — LOW (ref 10–20)
WBC # BLD: 10.33 K/UL — SIGNIFICANT CHANGE UP (ref 3.8–10.5)
WBC # FLD AUTO: 10.33 K/UL — SIGNIFICANT CHANGE UP (ref 3.8–10.5)

## 2019-12-31 PROCEDURE — 99239 HOSP IP/OBS DSCHRG MGMT >30: CPT

## 2019-12-31 PROCEDURE — 99231 SBSQ HOSP IP/OBS SF/LOW 25: CPT

## 2019-12-31 PROCEDURE — 99232 SBSQ HOSP IP/OBS MODERATE 35: CPT

## 2019-12-31 RX ORDER — ATORVASTATIN CALCIUM 80 MG/1
1 TABLET, FILM COATED ORAL
Qty: 30 | Refills: 0
Start: 2019-12-31 | End: 2020-01-29

## 2019-12-31 RX ORDER — CEPHALEXIN 500 MG
1 CAPSULE ORAL
Qty: 28 | Refills: 0
Start: 2019-12-31 | End: 2020-01-06

## 2019-12-31 RX ORDER — INSULIN GLARGINE 100 [IU]/ML
40 INJECTION, SOLUTION SUBCUTANEOUS
Qty: 0 | Refills: 0 | DISCHARGE

## 2019-12-31 RX ORDER — INSULIN GLARGINE 100 [IU]/ML
48 INJECTION, SOLUTION SUBCUTANEOUS
Qty: 0 | Refills: 0 | DISCHARGE

## 2019-12-31 RX ORDER — INSULIN LISPRO 100/ML
28 VIAL (ML) SUBCUTANEOUS
Qty: 0 | Refills: 0 | DISCHARGE

## 2019-12-31 RX ORDER — ATORVASTATIN CALCIUM 80 MG/1
1 TABLET, FILM COATED ORAL
Qty: 0 | Refills: 0 | DISCHARGE

## 2019-12-31 RX ORDER — INSULIN LISPRO 100/ML
22 VIAL (ML) SUBCUTANEOUS
Qty: 0 | Refills: 0 | DISCHARGE

## 2019-12-31 RX ADMIN — Medication 100 MILLIGRAM(S): at 13:46

## 2019-12-31 RX ADMIN — Medication 4: at 12:46

## 2019-12-31 RX ADMIN — Medication 100 MILLIGRAM(S): at 04:31

## 2019-12-31 RX ADMIN — Medication 28 UNIT(S): at 12:46

## 2019-12-31 RX ADMIN — Medication 8: at 08:58

## 2019-12-31 RX ADMIN — HEPARIN SODIUM 5000 UNIT(S): 5000 INJECTION INTRAVENOUS; SUBCUTANEOUS at 04:28

## 2019-12-31 RX ADMIN — Medication 28 UNIT(S): at 08:57

## 2019-12-31 NOTE — DISCHARGE NOTE NURSING/CASE MANAGEMENT/SOCIAL WORK - PATIENT PORTAL LINK FT
You can access the FollowMyHealth Patient Portal offered by [facility] by registering at the following website: [email or web address]. By joining [facility]’s FollowMyHealth portal, you will also be able to view your health information using other applications (apps) compatible with our system.

## 2019-12-31 NOTE — PROGRESS NOTE ADULT - REASON FOR ADMISSION
Osteomyelitis

## 2019-12-31 NOTE — PROGRESS NOTE ADULT - PROBLEM SELECTOR PROBLEM 4
EKG abnormalities
EKG abnormalities
Hyperlipidemia, unspecified hyperlipidemia type
Hypophosphatemia
Hypophosphatemia
Hyperlipidemia, unspecified hyperlipidemia type

## 2019-12-31 NOTE — PROGRESS NOTE ADULT - PROBLEM SELECTOR PROBLEM 2
Other acute osteomyelitis of left foot

## 2019-12-31 NOTE — PROGRESS NOTE ADULT - ASSESSMENT
33 yo man with diabetes since age 6 h/o right foot infection requiring amputation of 5th toe in 2017 now with foot infection left 5th toe.    h/o MRSA in wound 2017    Preliminary wound culture 12/25 (prior to OR)  shows group A strep and coag neg staph    Blood cultures no growth to date    Xray suggestive of osteomyelitis    s/p left 5th toe and partial metatarsal head resection on 12/27/19.   proximal bone no growth x 72 h.  pathology testing.    Vanco trough 16.2 - therapeutic today    Suggest:  can transition to keflex 500 mg po q 6 h x 7 d   Follow path- if margins of resection clean can limit duration of antibiotic to 5- 7 days post amputation.  F/U bone cx - so far negative to date    Val Cobian MD  Pager: [phone]  After 5 PM or weekends please call fellow on call or office [phone]
33 yo M hx T1DM (Hba1c 9.1%, uncontrolled), HLD, recurrent foot infection presenting due to weeks of left 5th toe pain and discharge, found to have osteomyelitis s/p left foot partial fifth ray resection on 12/27/19. Endocrine team consulted for uncontrolled T1DM.     1. Uncontrolled DM 1 c/b neuropathy   -Inpatient BG target 100-180 mg/dl; patient above target today as described above   -Noted patient has unbalanced basal/bolus regimen, and this AM with fasting hyperglycemia; therefore recommend increasing Lantus to 48 units SQ qHS  -Continue Humalog 28 units SQ TID before meals (Hold if NPO/not eating meal)--> patient demonstrated good response to this dose yesterday after lunch (25 +3 correction for total of 28, was 121 mg/dl before dinner) and was also on target after dinner (received 28 units at dinner, was 113 mg/dl at bedtime)  -While inpatient, continue Humalog moderate dose correctional scale SQ TID before meals and Humalog moderate dose bedtime correctional scale SQ qHS   -Check BG before meals and bedtime  -Consistent carbohydrate diet     Discharge Plan:  -If discharged today, recommend current doses as above, patient reports using Admelog PEN and Basaglar PEN at home. Therefore, recommend Admelog 28 units SQ TID before meals (Hold if not eating meal) and Basaglar 48 units SQ qHS. These doses are increases from previous home doses (A1c 9.1) but patient will likely need further titration as outpatient, notified patient to call endocrine for follow up appt after discharge.  -Recommend diet and lifestyle changes (including addition of exercise when able) and adherence to prescribed regimen. Patient should follow consistent carbohydrate, low fat, low cholesterol diet, monitor blood glucose 4x per day and take insulin doses as prescribed.  -Ensure patient has supplies including glucometer (per insurance), glucose test strips, lancets, alcohol swabs and insulin pen needles (enough for 4x per day injections)  -Follow up with [facility], [address] [place] [address], [address], [facility] [address], [phone]   *Patient to call to schedule appt, please ask for next available CDE/MD appointment after discharge.
33 yo M hx T1DM, HLD, recurrent foot infection presenting due to weeks of left 5th toe pain. Imaging finding concerning for OM.
33 yo M hx T1DM, HLD, recurrent foot infection presenting due to weeks of left 5th toe pain. Imaging finding concerning for OM.
33 yo M pt w/ L foot 5th digit wound  - pt seen and evaluated  - L foot: dusky 5th digit, 5th digit fibrogranular wound bed down to bone, scant purulence, no malodor, no fluctuance, no crepitus, diffuse edema throughout L foot  - wound culture currently growing GPCs  - XR positive for OM of L 5th middle phalanx  - EDDI/PVR completed this am - if abnormal read rec vasc sx c/s  tentatively booked for left foot 5th digit amputation tomorrow, 1:00pm pending patient decision.  Discussed risks vs benefits of amputation vs. conservative treatment to include use of long term abx via PICC with the associated risks of renal damage, resistance, PICC line infection, etc.  Patient understands and will decide within the next 24h.   -DSD applied  -WBAT in sx shoe to the left foot  -will follow
33 yo M pt w/ L foot 5th digit wound now s/p L foot partial fifth ray resection (DOS 12/27/19), POD #1  - Pt seen and evaluated  - Vitals stable, no leukocytosis, no overnight fevers, in NAD  - s/p L foot partial fifth ray resection (DOS 12/27/19); Sutures intact, no dehiscence, no acute SOI, mild proximal incision sanguinous drainage, no purulent drainage, streaking resolved, mild john-incisional erythema  - Dressed surgical site with DSD and ACE  - Advised to weight bear as tolerated to L foot in surgical shoe with weight distributed to heel to ensure sutures remain intact  - Pod plan to await cultures and sensitivities prior to d/c  - Podiatry will continue to monitor  - d/w attending
33 yo M pt w/ L foot 5th digit wound, now s/p L foot partial fifth ray resection (DOS 12/27/19), POD #2  - Pt seen and evaluated  - Afebrile, no leukocytosis  - Sutures intact, no dehiscence, stable w/ no acute signs of infection, streaking resolved w/ mild john-incisional erythema  - Dressed surgical site with DSD and ACE  - Dressing to remain clean, dry and intact until outpt follow up  - Advised to weight bear as tolerated to L foot in surgical shoe with weight distributed to heel to ensure sutures remain intact  - Pod plan to await cultures and sensitivities prior to d/c  - ID recs appreciated, 5-7 days PO abx pending neg cx   - Podiatry will continue to monitor  - d/w attending
33 yo man with diabetes since age 6 h/o right foot infection requiring amputation of 5th toe in 2017 now with foot infection left 5th toe.    h/o MRSA in wound 2017    Preliminary wound culture 12/25 shows group A strep and coag neg staph    Blood cultures no growth to date    Xray suggestive of osteomyelitis    s/p left 5th toe and partial metatarsal head resection on 12/27/19    Vanco trough 16.2 - therapeutic today    Suggest:  Continue empiric vanco and cefepime  follow blood and wound cultures  Follow path- if margins of resection clean can limit duration of antibiotic to 5- 7 days post amputation.  F/U bone cx - so far negative to date    Adair Agudelo MD  Pager [phone]  After 5pm/weekends call [phone]
34M w/T1DM, HLD, recurrent foot infection presenting due to weeks of left 5th toe pain. Imaging finding concerning for OM, s/p L foot partial fifth ray resection
35 yo M hx T1DM (Hba1c 9.1%, uncontrolled, on insulin at home), HLD, recurrent foot infection presenting due to weeks of left 5th toe pain and discharge, found to have osteomyelitis s/p left foot partial fifth ray resection on 12/27/19.   Endocrine team consulted for uncontrolled T1DM.     Uncontrolled DM1 c/b neuropathy   - target bg 100-180 mg/dl  - increase lantus to 43 units qhs  - increase humalog 28 units sq tid ac  - change to humalog moderate correction scale ac and for HS, also    Dc plan- basal bolus- doses TBD     follow up at [facility] endocrinology- [phone]
35 yo man with diabetes since age 6 h/o right foot infection requiring amputation of 5th toe in 2017 now with foot infection left 5th toe.    h/o MRSA in wound 2017     preliminary wound culture 12/25 shows group A strep and coag neg staph    blood cultures no growth to date    x ray suggestive of osteomyelitis    s/p left 5th toe and partial metatarsal head resection today--    Suggest:    continue empiric vanco and cefepime    follow blood and wound cultures    check vanco trough prior to 4th dose    follow path- if margins of resection clean can limit duration of antibiotic to 5- 7 days post amputation.    ID service will follow over weekend.    Val Cobian MD  Pager: [phone]  After 5 PM or weekends please call fellow on call or office [phone]
35 yo man with diabetes since age 6 h/o right foot infection requiring amputation of 5th toe in 2017 now with foot infection left 5th toe.    h/o MRSA in wound 2017    Preliminary wound culture 12/25 shows group A strep and coag neg staph    Blood cultures no growth to date    Xray suggestive of osteomyelitis    s/p left 5th toe and partial metatarsal head resection on 12/27/19.   proximal bone no growth x 48 h.  pathology testing.    Vanco trough 16.2 - therapeutic today    Suggest:  can de-escalate to cefazolin 2 gm iv q 8 h  d/c vanco and cefepime  Follow path- if margins of resection clean can limit duration of antibiotic to 5- 7 days post amputation.  F/U bone cx - so far negative to date    Val Cobian MD  Pager: [phone]  After 5 PM or weekends please call fellow on call or office [phone]
EKG ST diffuse TWI , repeat at slower rates with normal T waves     Assessment and Plan     1) Pre op eval : Asymptomatic , EKG changes likely rate related , tachycardia 2/2 to osteo, scheduled for 5th toe amp , Optimized from a cardiac standpoint for Toe amputation under local anesthesia, would get Echo before if planned for general anesthesia      2) DM: t/t per IM    3) DVT PPX heparin
EKG ST diffuse TWI , repeat at slower rates with normal T waves     Assessment and Plan     1) Pre op eval : tolerated procedure well , echo normal , ok to d/c tele      2) DM: t/t per IM    3) DVT PPX heparin
EKG ST diffuse TWI , repeat at slower rates with normal T waves     Assessment and Plan     1) Pre op eval : tolerated procedure well , echo normal f/u as op     2) DM: t/t per IM    3) DVT PPX heparin
Plan:   To OR today for L foot partial 5th ray resection @ 1:00pm w/ Dr. Blue.  CXR on sunrise.  EKG on sunrise.  Medical/Cardiac clearance documented in chart.  Consent signed and in chart.  Procedure was explained to patient in detail. All alternatives, risks and complications were discussed. All questions answered.
34M w/T1DM, HLD, recurrent foot infection presenting due to weeks of left 5th toe pain. Imaging finding concerning for OM, s/p L foot partial fifth ray resection

## 2019-12-31 NOTE — PROGRESS NOTE ADULT - SUBJECTIVE AND OBJECTIVE BOX
CC: Patient is a 34y old  Male who presents with a chief complaint of Osteomyelitis (29 Dec 2019 09:17)    ID following for left 5th toe OM    Interval History/ROS: Patient feels well. anxous to go home. OR operative note appreciated.  bone culture neg to date. path testing.  No fevers, no chills.    Rest of ROS negative.    Allergies  No Known Allergies    ANTIMICROBIALS:ceFAZolin   IVPB 2000 every 8 hours        OTHER MEDS:  acetaminophen   Tablet .. 325 milliGRAM(s) Oral every 4 hours PRN  dextrose 40% Gel 15 Gram(s) Oral once PRN  dextrose 5%. 1000 milliLiter(s) IV Continuous <Continuous>  dextrose 50% Injectable 12.5 Gram(s) IV Push once  dextrose 50% Injectable 25 Gram(s) IV Push once  dextrose 50% Injectable 25 Gram(s) IV Push once  glucagon  Injectable 1 milliGRAM(s) IntraMuscular once PRN  heparin  Injectable 5000 Unit(s) SubCutaneous two times a day  influenza   Vaccine 0.5 milliLiter(s) IntraMuscular once  insulin glargine Injectable (LANTUS) 35 Unit(s) SubCutaneous at bedtime  insulin lispro (HumaLOG) corrective regimen sliding scale   SubCutaneous three times a day before meals  insulin lispro (HumaLOG) corrective regimen sliding scale   SubCutaneous at bedtime  insulin lispro Injectable (HumaLOG) 22 Unit(s) SubCutaneous three times a day before meals    Vital Signs Last 24 Hrs  T(F): 97.7 (12-31-19 @ 14:12), Max: 98.5 (12-31-19 @ 05:00)  HR: 79 (12-31-19 @ 14:12)  BP: 118/69 (12-31-19 @ 14:12)  RR: 18 (12-31-19 @ 14:12)  SpO2: 94% (12-31-19 @ 14:12) (94% - 100%)    Gen: AOx3, NAD, non-toxic  CV: S1+S2 normal, no murmurs  Resp: Clear bilat, no resp distress  Abd: Soft, nontender, +BS  Ext: Left foot s/p 5th toe amputation, sutures in place, wound closed.  : No Velazquez  IV/Skin: No thrombophlebitis  Neuro: no focal deficits    LABS:                                     13.7   10.33 )-----------( 355      ( 31 Dec 2019 05:22 )             41.2 12-31    131  |  95  |  18  ----------------------------<  340  4.3   |  22  |  0.63  Ca    9.4      31 Dec 2019 05:22Phos  3.3     12-31Mg     1.8     12-31        MICROBIOLOGY:    BONE  12-27-19 --  --  --    Culture - Tissue with Gram Stain (12.27.19 @ 15:45)    Culture - Tissue:   NO ORGANISMS ISOLATED AT 24 HOURS  NO ORGANISMS ISOLATED AT 48 HRS.  NO ORGANISMS ISOLATED AT 72 HRS.    Gram Stain Wound:   NOS^No Organisms Seen  WBC^White Blood Cells  QNTY CELLS IN GRAM STAIN: RARE (1+)    Specimen Source: BONE          BLOOD  12-25-19 --  --  --      OTHER  12-25-19   MANY  Routine susceptibility testing not performed as  Streptococcus Grp A/B is susceptible to drug(s) of choice  (Penicillin and Ampicillin).  TESTS: S. PYOGENES- (GROUP A )  CALLED TO: [id number] NEAL RN/YES  READ BACK (2 Patient Identifiers) (Y/N): Y  READ BACK VALUES (Y/N): Y  DATE / TIME CALLED: 12/28/19 0957  CALLED BY: ARNALDO MASCORRO^Streptococcus pyogenes (Gp A)  STCN^Staphylococcus sp.,coag neg  --  --    RADIOLOGY:    < from: Xray Foot AP + Lateral + Oblique, Left (12.27.19 @ 14:39) >  IMPRESSION:  Status post left 5th toe and partial 5th metatarsal head resection with sharp smooth and beveled osseous stump margin and with postsurgical change in the overlying soft tissues.    No proximally tracking gas collections and no focal areas of osteomyelitis.    Remainder of foot unchanged.    Correlate with intraoperative findings.    < end of copied text >

## 2019-12-31 NOTE — PROGRESS NOTE ADULT - PROVIDER SPECIALTY LIST ADULT
Anesthesia
Cardiology
Endocrinology
Endocrinology
Hospitalist
Infectious Disease
Podiatry
Infectious Disease

## 2019-12-31 NOTE — PROGRESS NOTE ADULT - PROBLEM SELECTOR PLAN 3
Reports taking 35-40U lantus and 24u qac at home, does not have endo followup  A1c 9.1  -Remains hyperglycemic  -C/w Lantus 48U qhs  -C/w Humalog 28U qac  -Endocrine following, appreciate recs  -FS and moderate dose ISS qac and qHS

## 2019-12-31 NOTE — PROGRESS NOTE ADULT - SUBJECTIVE AND OBJECTIVE BOX
Chloe Henriquez  [facility]  Pager #[phone]    Patient is a 34y old  Male who presents with a chief complaint of Osteomyelitis (31 Dec 2019 14:13)      SUBJECTIVE / OVERNIGHT EVENTS: Patient seen and examined at bedside. Patient upset that his FS continue to be elevated in the morning. Denies eating anything after dinner. Still waiting to receive a cane to help him walk.    ADDITIONAL REVIEW OF SYSTEMS:    MEDICATIONS  (STANDING):  atorvastatin 40 milliGRAM(s) Oral at bedtime  ceFAZolin   IVPB 2000 milliGRAM(s) IV Intermittent every 8 hours  dextrose 5%. 1000 milliLiter(s) (50 mL/Hr) IV Continuous <Continuous>  dextrose 50% Injectable 12.5 Gram(s) IV Push once  dextrose 50% Injectable 25 Gram(s) IV Push once  dextrose 50% Injectable 25 Gram(s) IV Push once  heparin  Injectable 5000 Unit(s) SubCutaneous two times a day  influenza   Vaccine 0.5 milliLiter(s) IntraMuscular once  insulin glargine Injectable (LANTUS) 43 Unit(s) SubCutaneous at bedtime  insulin lispro (HumaLOG) corrective regimen sliding scale   SubCutaneous three times a day before meals  insulin lispro (HumaLOG) corrective regimen sliding scale   SubCutaneous at bedtime  insulin lispro Injectable (HumaLOG) 28 Unit(s) SubCutaneous three times a day before meals    MEDICATIONS  (PRN):  acetaminophen   Tablet .. 325 milliGRAM(s) Oral every 4 hours PRN Moderate Pain (4 - 6), Severe Pain (7 - 10)  dextrose 40% Gel 15 Gram(s) Oral once PRN Blood Glucose LESS THAN 70 milliGRAM(s)/deciliter  glucagon  Injectable 1 milliGRAM(s) IntraMuscular once PRN Glucose LESS THAN 70 milligrams/deciliter      CAPILLARY BLOOD GLUCOSE      POCT Blood Glucose.: 231 mg/dL (31 Dec 2019 11:59)  POCT Blood Glucose.: 344 mg/dL (31 Dec 2019 08:33)  POCT Blood Glucose.: 113 mg/dL (30 Dec 2019 21:44)  POCT Blood Glucose.: 121 mg/dL (30 Dec 2019 17:41)    I&O's Summary      PHYSICAL EXAM:    Vital Signs Last 24 Hrs  T(C): 36.5 (31 Dec 2019 14:12), Max: 36.9 (31 Dec 2019 05:00)  T(F): 97.7 (31 Dec 2019 14:12), Max: 98.5 (31 Dec 2019 05:00)  HR: 79 (31 Dec 2019 14:12) (79 - 85)  BP: 118/69 (31 Dec 2019 14:12) (117/64 - 122/59)  BP(mean): --  RR: 18 (31 Dec 2019 14:12) (18 - 18)  SpO2: 94% (31 Dec 2019 14:12) (94% - 100%)    CONSTITUTIONAL: NAD, well-developed, well-groomed  EYES: conjunctiva and sclera clear  ENMT: Moist oral mucosa, no pharyngeal injection or exudates  NECK: Supple  RESPIRATORY: Normal respiratory effort; lungs are clear to auscultation bilaterally  CARDIOVASCULAR: Regular rate and rhythm, normal S1 and S2, no murmur/rub/gallop; No lower extremity edema  ABDOMEN: Nontender to palpation, normoactive bowel sounds, no rebound/guarding; No hepatosplenomegaly  MUSCULOSKELETAL: no clubbing or cyanosis of digits; no joint swelling or tenderness to palpation  PSYCH: A+O to person, place, and time; affect appropriate  NEUROLOGY: CN 2-12 are intact and symmetric; no gross sensory deficits   SKIN: Left foot wrapped in ACE bandage, C/D/I    LABS:                        13.7   10.33 )-----------( 355      ( 31 Dec 2019 05:22 )             41.2     12-31    131<L>  |  95<L>  |  18  ----------------------------<  340<H>  4.3   |  22  |  0.63    Ca    9.4      31 Dec 2019 05:22  Phos  3.3     12-31  Mg     1.8     12-31    Culture - Tissue with Gram Stain (12.27.19 @ 15:45)    Culture - Tissue:   NO ORGANISMS ISOLATED AT 24 HOURS  NO ORGANISMS ISOLATED AT 48 HRS.  NO ORGANISMS ISOLATED AT 72 HRS.    Gram Stain Wound:   NOS^No Organisms Seen  WBC^White Blood Cells  QNTY CELLS IN GRAM STAIN: RARE (1+)    Specimen Source: BONE      RADIOLOGY & ADDITIONAL TESTS: No new imaging  Results Reviewed: Yes  Imaging Personally Reviewed:  Electrocardiogram Personally Reviewed:    COORDINATION OF CARE:  Care Discussed with Consultants/Other Providers [Y/N]:  Prior or Outpatient Records Reviewed [Y/N]:

## 2019-12-31 NOTE — PROGRESS NOTE ADULT - PROBLEM SELECTOR PLAN 2
s/p L foot partial fifth ray resection on 12/27   -C/w Cefazolin  -ID and podiatry following   -f/u OR cultures- no growth to date  -Superficial wound cultures- strep pyogenes  -Bone cx NGTD  -WBAT in sx shoe to the left foot  -Outpatient PT s/p L foot partial fifth ray resection on 12/27   -C/w Cefazolin, switch to Keflex upon discharge  -ID and podiatry following   -f/u OR cultures- no growth to date  -Superficial wound cultures- strep pyogenes  -Bone cx NGTD  -WBAT in sx shoe to the left foot  -Outpatient PT

## 2019-12-31 NOTE — PROGRESS NOTE ADULT - PROBLEM SELECTOR PLAN 5
EKG with TWI in V2, V5, V6 and Avf  EKG from 2017 - NSR with no TWI   No history of heart disease in self. Mother ( Aged 60) has history of Heart disease and had stents placed a year back   TTE wnl, no need for tele   Cardiology following, appreciate recs
Active smoker  offered replacement but  doesn't think he needs it
Active smoker  offered replacement but  doesn't think he needs it
EKG with TWI in V2, V5, V6 and Avf  EKG from 2017 - NSR with no TWI   No history of heart disease in self. Mother ( Aged 60) has history of Heart disease and had stents placed a year back   TTE wnl, no need for tele   Cardiology following, appreciate recs
EKG with TWI in V2, V5, V6 and Avf  EKH from 2017 - NSR with no TWI   Denies CP/SOB. Able to walk about 20 blocks if no foot pain   No history of heart disease in selg.Mother ( Aged 60) has history of Heart disease and had stents placed a year back   Troponin - 13  Appreciate Cardiology -EKG changes likely rate related , tachycardia 2/2 to osteo  Fu Echo  Pt medically optimized for  left foot partial fifth ray resection under light sedation
EKG with TWI in V2, V5, V6 and Avf  EKH from 2017 - NSR with no TWI   Denies CP/SOB. Able to walk about 20 blocks if no foot pain   No history of heart disease in selg.Mother ( Aged 60) has history of Heart disease and had stents placed a year back   Troponin - 13  Will repeat EKG   FU Cardiology consult- Dr Milian consulted

## 2019-12-31 NOTE — PROGRESS NOTE ADULT - PROBLEM SELECTOR PROBLEM 7
Need for prophylactic measure
Need for prophylactic measure
Discharge planning issues
Discharge planning issues
Need for prophylactic measure
Need for prophylactic measure

## 2019-12-31 NOTE — PROGRESS NOTE ADULT - PROBLEM SELECTOR PROBLEM 1
Sepsis
Type 1 diabetes mellitus with hyperglycemia
Type 1 diabetes mellitus with hyperglycemia
Sepsis

## 2019-12-31 NOTE — PROGRESS NOTE ADULT - SUBJECTIVE AND OBJECTIVE BOX
Rashaad De La Garza MD  Interventional Cardiology / Endovascular Specialist  [place] Office : [phone] [place] [address] [place] [address]  Tel:   [place] Office : [address] [facility] [place] [address]  Tel: [phone]  Cell : [phone]    HISTORY OF PRESENTING ILLNESS:    35 yo M hx T1DM, HLD, recurrent foot infection presenting due to weeks of left 5th toe pain. found to have OM s/p toe amp tolerated the procedure well  	  MEDICATIONS:  heparin  Injectable 5000 Unit(s) SubCutaneous two times a day    ceFAZolin   IVPB 2000 milliGRAM(s) IV Intermittent every 8 hours      acetaminophen   Tablet .. 325 milliGRAM(s) Oral every 4 hours PRN      atorvastatin 40 milliGRAM(s) Oral at bedtime  dextrose 40% Gel 15 Gram(s) Oral once PRN  dextrose 50% Injectable 12.5 Gram(s) IV Push once  dextrose 50% Injectable 25 Gram(s) IV Push once  dextrose 50% Injectable 25 Gram(s) IV Push once  glucagon  Injectable 1 milliGRAM(s) IntraMuscular once PRN  insulin glargine Injectable (LANTUS) 43 Unit(s) SubCutaneous at bedtime  insulin lispro (HumaLOG) corrective regimen sliding scale   SubCutaneous three times a day before meals  insulin lispro (HumaLOG) corrective regimen sliding scale   SubCutaneous at bedtime  insulin lispro Injectable (HumaLOG) 28 Unit(s) SubCutaneous three times a day before meals    dextrose 5%. 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 No nausea, vomiting, or hematemesis; No diarrhea or constipation. No melena or hematochezia.  GENITOURINARY: No dysuria, frequency, hematuria, or incontinence  NEUROLOGICAL: No headaches, memory loss, loss of strength, numbness, or tremors  ENDOCRINE: No heat or cold intolerance; No hair loss  MUSCULOSKELETAL: No joint pain or swelling; No muscle, back, or extremity pain  PSYCHIATRIC: No depression, anxiety, mood swings, or difficulty sleeping  HEME/LYMPH: No easy bruising, or bleeding gums  All others negative    PHYSICAL EXAM:  T(C): 36.5 (12-31-19 @ 14:12), Max: 36.9 (12-31-19 @ 05:00)  HR: 79 (12-31-19 @ 14:12) (79 - 85)  BP: 118/69 (12-31-19 @ 14:12) (117/64 - 122/59)  RR: 18 (12-31-19 @ 14:12) (18 - 18)  SpO2: 94% (12-31-19 @ 14:12) (94% - 100%)  Wt(kg): --  I&O's Summary      Weight (kg): 98.4 (12-31 @ 06:45)        GENERAL: NAD, well-groomed, well-developed  EYES: EOMI, PERRLA, conjunctiva and sclera clear  ENMT: No tonsillar erythema, exudates, or enlargement; Moist mucous membranes, Good dentition, No lesions  Cardiovascular: Normal S1 S2, No JVD, No murmurs, No edema  Respiratory: Lungs clear to auscultation	  Gastrointestinal:  Soft, Non-tender, + BS	  Extremities:  No clubbing, cyanosis or edema  LYMPH: No lymphadenopathy noted  NERVOUS SYSTEM:  Alert & Oriented X3, Good concentration; Motor Strength 5/5 B/L upper and lower extremities; DTRs 2+ intact and symmetric                                  13.7   10.33 )-----------( 355      ( 31 Dec 2019 05:22 )             41.2     12-31    131<L>  |  95<L>  |  18  ----------------------------<  340<H>  4.3   |  22  |  0.63    Ca    9.4      31 Dec 2019 05:22  Phos  3.3     12-31  Mg     1.8     12-31      proBNP:   Lipid Profile:   HgA1c:   TSH:     Consultant(s) Notes Reviewed:  [x ] YES  [ ] NO    Care Discussed with Consultants/Other Providers [ x] YES  [ ] NO    Imaging Personally Reviewed independently:  [x] YES  [ ] NO    All labs, radiologic studies, vitals, orders and medications list reviewed. Patient is seen and examined at bedside. Case discussed with medical team.

## 2019-12-31 NOTE — PROGRESS NOTE ADULT - PROBLEM SELECTOR PLAN 1
2/2 OM, resolved   C/w Cefazolin, can transition to PO abx for 5-7 days if path comes back with clean margins per ID   Blood cultures- NGTD   Wound cultures- strep pyogenes  Bone cx - NGTD 2/2 OM, resolved   C/w Cefazolin, can transition to PO Keflex q6h for 7 more days  Blood cultures- NGTD   Wound cultures- strep pyogenes  Bone cx - NGTD

## 2019-12-31 NOTE — CHART NOTE - NSCHARTNOTEFT_GEN_A_CORE
Discussed case with ID Dr. Cobian.  Recommended to change antibiotics to keflex 500mg po q6h on discharge for 7 days.  Cleared by ID for discharge home.  Attending MD Dr. Henriquez made aware.

## 2019-12-31 NOTE — DISCHARGE NOTE NURSING/CASE MANAGEMENT/SOCIAL WORK - NSDCPEWEB_GEN_ALL_CORE
[facility] for Tobacco Control website --- [email or web address] website --- [email or web address] ([email or web address]

## 2019-12-31 NOTE — PROGRESS NOTE ADULT - PROBLEM SELECTOR PLAN 7
DVT ppx: Lovenox  Diet: Consistent carb  Dispo: Home once FS better controlled
Lovenox for DVT ppx
1.  Name of PCP:  Ally Jo  2.  PCP Contacted on Admission: [ X] Y    [x] N  - spoke to Medical assistant on 12/27  3.  PCP contacted at Discharge: [ ] Y    [ ] N    [ ] N/A  4.  Post-Discharge Appointment Date and Location:  5.  Summary of Handoff given to PCP:
1.  Name of PCP:  Ally Jo  2.  PCP Contacted on Admission: [ X] Y    [x] N  - spoke to Medical assistant on 12/27  3.  PCP contacted at Discharge: [ ] Y    [ ] N    [ ] N/A  4.  Post-Discharge Appointment Date and Location:  5.  Summary of Handoff given to PCP:
DVT ppx: Lovenox  Diet: Consistent carb  Dispo: Home once FS better controlled
Lovenox for DVT ppx

## 2019-12-31 NOTE — PROGRESS NOTE ADULT - SUBJECTIVE AND OBJECTIVE BOX
Chief Complaint: DM 1 with hyperglycemia    History: Patient seen at bedside. Noted with hyperglycemia this AM to 344 mg/dl before breakfast. Patient denies eating overnight, denies snacking last night or this morning. Denies n/v, denies s/s of hypoglycemia. Per primary team, possible discharge today.    MEDICATIONS  (STANDING):  atorvastatin 40 milliGRAM(s) Oral at bedtime  ceFAZolin   IVPB 2000 milliGRAM(s) IV Intermittent every 8 hours  dextrose 5%. 1000 milliLiter(s) (50 mL/Hr) IV Continuous <Continuous>  dextrose 50% Injectable 12.5 Gram(s) IV Push once  dextrose 50% Injectable 25 Gram(s) IV Push once  dextrose 50% Injectable 25 Gram(s) IV Push once  heparin  Injectable 5000 Unit(s) SubCutaneous two times a day  influenza   Vaccine 0.5 milliLiter(s) IntraMuscular once  insulin glargine Injectable (LANTUS) 43 Unit(s) SubCutaneous at bedtime  insulin lispro (HumaLOG) corrective regimen sliding scale   SubCutaneous three times a day before meals  insulin lispro (HumaLOG) corrective regimen sliding scale   SubCutaneous at bedtime  insulin lispro Injectable (HumaLOG) 28 Unit(s) SubCutaneous three times a day before meals    MEDICATIONS  (PRN):  acetaminophen   Tablet .. 325 milliGRAM(s) Oral every 4 hours PRN Moderate Pain (4 - 6), Severe Pain (7 - 10)  dextrose 40% Gel 15 Gram(s) Oral once PRN Blood Glucose LESS THAN 70 milliGRAM(s)/deciliter  glucagon  Injectable 1 milliGRAM(s) IntraMuscular once PRN Glucose LESS THAN 70 milligrams/deciliter      No Known Allergies    Review of Systems:  HEENT: No pain  Cardiovascular: No chest pain  Respiratory: No SOB  GI: No nausea, vomiting    PHYSICAL EXAM:  VITALS: T(C): 36.5 (12-31-19 @ 14:12)  T(F): 97.7 (12-31-19 @ 14:12), Max: 98.5 (12-31-19 @ 05:00)  HR: 79 (12-31-19 @ 14:12) (79 - 85)  BP: 118/69 (12-31-19 @ 14:12) (117/64 - 122/59)  RR:  (18 - 18)  SpO2:  (94% - 100%)  Wt(kg): --  GENERAL: NAD  EYES: No proptosis, anicteric  HEENT:  Atraumatic, Normocephalic, moist mucous membranes  RESPIRATORY: unlabored respirations   PSYCH: Alert and oriented x 3    CAPILLARY BLOOD GLUCOSE    POCT Blood Glucose.: 231 mg/dL (31 Dec 2019 11:59)  POCT Blood Glucose.: 344 mg/dL (31 Dec 2019 08:33)  POCT Blood Glucose.: 113 mg/dL (30 Dec 2019 21:44)  POCT Blood Glucose.: 121 mg/dL (30 Dec 2019 17:41)      12-31    131<L>  |  95<L>  |  18  ----------------------------<  340<H>  4.3   |  22  |  0.63    EGFR if : 147  EGFR if non : 127    Ca    9.4      12-31  Mg     1.8     12-31  Phos  3.3     12-31        Hemoglobin A1C, Whole Blood: 9.1 % <H> [4.0 - 5.6] (12-29-19 @ 06:58)

## 2019-12-31 NOTE — PROGRESS NOTE ADULT - PROBLEM SELECTOR PROBLEM 3
Type 1 diabetes mellitus with hyperglycemia

## 2020-01-02 LAB
BACTERIA SKIN AEROBE CULT: SIGNIFICANT CHANGE UP
SURGICAL PATHOLOGY STUDY: SIGNIFICANT CHANGE UP

## 2020-01-09 NOTE — H&P ADULT - REASON FOR ADMISSION
Anxiety Reaction  Anxiety is the feeling we all get when we think something bad might happen. It is a normal response to stress and usually causes only a mild reaction. When anxiety becomes more severe, it can interfere with daily life. In some cases, you may not even be aware of what it is youre anxious about. There may also be a genetic link or it may be a learned behavior in the home.  Both psychological and physical triggers cause stress reaction. It's often a response to fear or emotional stress, real or imagined. This stress may come from home, family, work, or social relationships.  During an anxiety reaction, you may feel:  · Helpless  · Nervous  · Depressed  · Irritable  Your body may show signs of anxiety in many ways. You may experience:  · Dry mouth  · Shakiness  · Dizziness  · Weakness  · Trouble breathing  · Breathing fast (hyperventilating)  · Chest pressure  · Sweating  · Headache  · Nausea  · Diarrhea  · Tiredness  · Inability to sleep  · Sexual problems  Home care  · Try to locate the sources of stress in your life. They may not be obvious. These may include:  ¨ Daily hassles of life (traffic jams, missed appointments, car troubles, etc.)  ¨ Major life changes, both good (new baby, job promotion) and bad (loss of job, loss of loved one)  ¨ Overload: feeling that you have too many responsibilities and can't take care of all of them at once  ¨ Feeling helpless, feeling that your problems are beyond what youre able to solve  · Notice how your body reacts to stress. Learn to listen to your body signals. This will help you take action before the stress becomes severe.  · When you can, do something about the source of your stress. (Avoid hassles, limit the amount of change that happens in your life at one time and take a break when you feel overloaded).  · Unfortunately, many stressful situations can't be avoided. It is necessary to learn how to better manage stress. There are many proven methods  that will reduce your anxiety. These include simple things like exercise, good nutrition and adequate rest. Also, there are certain techniques that are helpful:  ¨ Relaxation  ¨ Breathing exercises  ¨ Visualization  ¨ Biofeedback  ¨ Meditation  For more information about this, consult your doctor or go to a local bookstore and review the many books and tapes available on this subject.  Follow-up care  If you feel that your anxiety is not responding to self-help measures, contact your doctor or make an appointment with a counselor. You may need short-term psychological counseling and temporary medicine to help you manage stress.  Call 911  Call your healthcare provider right away if any of these occur:  · Trouble breathing  · Confusion  · Drowsiness or trouble wakening  · Fainting or loss of consciousness  · Rapid heart rate  · Seizure  · New chest pain that becomes more severe, lasts longer, or spreads into your shoulder, arm, neck, jaw, or back  When to seek medical advice  Call your healthcare provider right away if any of these occur:  · Your symptoms get worse  · Severe headache not relieved by rest and mild pain reliever  Date Last Reviewed: 9/29/2015  © 9627-7171 [employer]. [address] [place] [address]. All rights reserved. This information is not intended as a substitute for professional medical care. Always follow your healthcare professional's instructions.        Adult Immunization Schedule  Vaccine How Often Disease Prevented Who Needs It   Influenza Every year Flu. This can be especially dangerous to elderly adults or people with immune disorders. All adults   Tetanus, diphtheria (Td); or Tetanus, diphtheria, and pertussis (Tdap)* One dose of Tdap, then one dose of Td as a booster every 10 years Tetanus (lockjaw), a disease that causes muscles to spasm  Diphtheria, an infection that causes fever, weakness, and breathing problems  Pertussis, also known as whooping cough. This  Osteomyelitis is a highly contagious disease that can cause serious illness. All adults  *This vaccine should be given during each pregnancy no matter how many years since the last vaccine. The vaccine increases protection for your . A  is too young to get the vaccine, but at the highest risk for severe illness and death from pertussis.   Varicella (Brianne)** One series of 2 injections Chickenpox. This is a disease that causes itchy skin bumps, fever, and tiredness. It can lead to scarring, pneumonia, or brain inflammation. Adults who dont have evidence of immunity  **This vaccine should not be given to pregnant women. Women should avoid pregnancy for 4 weeks after the vaccine.   Human papillomavirus (HPV) One series of 3 injections Cervical cancer, caused by certain types of HPV  Vaginal and vulvar cancer  Penile cancer  Head and neck cancers  Anal cancer  Genital warts Females and males ages 15 to 26  Ask your healthcare provider if this vaccine is right for you.   Zoster--- 1 dose Herpes zoster (shingles), a painful rash marked by blisters Adults ages 60 and older.  ---You should not get this vaccine if your immune system is low. For example, if you have HIV, are taking medicines that suppress your immune system, or are getting cancer treatment.   Measles, mumps, rubella (MMR)** 1 or 2 doses, for ages 19 through 49; 1 dose for ages 50 and older if at risk Measles, a disease marked by red spots, fever, and coughing  Mumps, a disease that causes swelling in the salivary glands. It may affect the ovaries or testes.  Rubella (Frisian measles). This is a form of measles that can cause birth defects if a pregnant woman catches it. Adults born in 1957 or later who are not known to be immune to all 3 of these diseases. Ask your healthcare provider if you need a second dose.  **This vaccine should not be given to pregnant women. Women should avoid pregnancy for 4 weeks after vaccination.   Pneumococcal (PCV 13) 1 dose  Pneumonia. This is an infection that causes inflammation in your lungs. It can lead to death. Adults ages 65 and older. Also, adults ages 19 and older with weak immune systems or any of these health conditions: chronic renal failure, nephrotic syndrome, functional or anatomic asplenia, cerebrospinal fluid (CSF) leaks, or cochlear implants. This vaccine adds extra protection to PCV 23 and should be given about 2 months before PCV 23.   Pneumococcal (PPSV23)  1 or 2 doses Pneumonia. This is an infection that causes inflammation in your lungs. It can lead to death. Adults ages 65 and older. Also, adults with chronic illnesses, such as asthma, COPD, heart disease, diabetes, liver disease, alcoholism, sickle cell disease, or history of splenectomy. In addition, adults with an immune disorder and adults who smoke cigarettes. This vaccine is recommended for all adults regardless of immune status.   Meningococcal  (MCV or MPSV) 1 or more doses Meningococcal disease (bacterial meningitis). This is inflammation of the membrane covering the brain and spinal cord. It can lead to death. Adults with immune deficiencies or high risk of exposure. Also, college freshmen living in dormitories and  recruits.   Hepatitis A (HepA) One series of 2 injections Hepatitis A. This is an infection that can result in acute liver inflammation and yellow skin and whites of the eyes (jaundice). Adults with risk factors, such as clotting disorders or chronic liver disease, and adults with high risk of exposure. This includes men who have sex with men, IV drug users, and travelers to countries where hepatitis A is common.   Hepatitis B (HepB) One series of 3 injections Hepatitis B. This is an infection that causes chronic, severe liver disease. Adults with high risk of exposure, such as healthcare providers and sanitation workers, and adults with diabetes   Travelers diseases As needed Infections such as cholera, typhoid, yellow fever,  polio, rabies, meningococcal disease, hepatitis A, hepatitis B Adults traveling out of the country. Required vaccines will vary, depending on the country you visit. Check the CDC website: www.cdc.gov.    ,  Based on the CDC National Immunization Program recommendations for adults.  Date Last Reviewed: 2/1/2017 © 2000-2017 [employer]. [address] [place] [address]. All rights reserved. This information is not intended as a substitute for professional medical care. Always follow your healthcare professional's instructions.        Prevention Guidelines, Men Ages 18 to 39  Screening tests and vaccines are an important part of managing your health. Health counseling is essential, too. Below are guidelines for these, for men ages 18 to 39. Talk with your healthcare provider to make sure youre up-to-date on what you need.  Screening Who needs it How often   Alcohol misuse All men in this age group At routine exams   Blood pressure All men in this age group Every 2 years if your blood pressure is less than 120/80 mm Hg; yearly if your systolic blood pressure is 120 to 139 mm Hg, or your diastolic blood pressure reading is 80 to 89 mm Hg   Depression All men in this age group At routine exams   Diabetes mellitus, type 2 Adults who have no symptoms but are overweight or obese and have 1 or more other risk factors for diabetes At least every 3 years (yearly if blood sugar has already started to rise)   Hepatitis C If at increased risk At routine exams   High cholesterol or triglycerides All men ages 35 and older, and younger men at high risk for coronary artery disease At least every 5 years   HIV All men At routine exams   Obesity All men in this age group At routine exams   Syphilis Men at increased risk for infection - talk with your healthcare provider At routine exams   Tuberculosis Men at increased risk for infection - talk with your healthcare provider Check with your healthcare provider    Vision All men in this age group Every 5 to 10 years if no risk factors for eye disease   Vaccines Who needs it How often   Chickenpox (varicella) All men in this age group who have no record of this infection or vaccine 2 doses; the second dose should be given at least 4 weeks after the first dose   Hepatitis A Men at increased risk for infection - talk with your healthcare provider 2 doses given at least 6 months apart   Hepatitis B Men at increased risk for infection - talk with your healthcare provider 3 doses over 6 months; second dose should be given 1 month after the first dose; the third dose should be given at least 2 months after the second dose and at least 4 months after the first dose   Haemophilus influenzae Type B (HIB) Men at increased risk for infection - talk with your healthcare provider 1 to 3 doses   Human papillomavirus (HPV) All men in this age group up to age 26 3 doses; the second dose should be given 1 to 2 months after the first dose and the third dose given 6 months after the first dose   Influenza (flu) All men in this age group Once a year   Measles, mumps, rubella (MMR) All men in this age group who have no record of these infections or vaccines 1 or 2 doses through age 55   Meningococcal Men at increased risk for infection - talk with your healthcare provider 1 or more doses   Pneumococca (PCV13) and Pneumococcal (PPSV23) Men at increased risk for infection - talk with your healthcare provider PCV13: 1 dose ages 19 to 65 (protects against 13 types of pneumococcal bacteria)  PPSV23: 1 to 2 doses through age 64, or 1 dose at 65 or older (protects against 23 types of pneumococcal bacteria)   Tetanus/diphtheria/pertussis (Td/Tdap) booster All men in this age group A one-time Tdap booster after age 18, then Td every10 years   Counseling Who needs it How often   Diet and exercise Overweight or obese people When diagnosed, and then at routine exams   Use of tobacco and the health effects  it can cause All men in this age group Every visit   Sexually transmitted infection prevention Men who are sexually active At routine exams   Skin cancer Prevention of skin cancer in fair-skinned adults through age 24 At routine exams   1Those who are 18 years of age, who are not up-to-date on their childhood immunizations, should receive all appropriate catch-up vaccines recommended by the CDC.  Date Last Reviewed: 2/1/2017  © 7794-5767 [employer]. [address] [place] [address], [place] [address]. All rights reserved. This information is not intended as a substitute for professional medical care. Always follow your healthcare professional's instructions.

## 2020-01-22 LAB — FUNGUS SPEC QL CULT: SIGNIFICANT CHANGE UP

## 2020-07-27 NOTE — DISCHARGE NOTE ADULT - PLAN OF CARE
Dr Cardozo notified of bp 76/24-orders received. s/p excisional OR debridement, local wound care with antibiotics FOLLOW UP - please follow up Dr. Tg Blue within 1 week of discharge.  [phone] Please call within 1 week for appointment  WOUND CARE - please have home care nursing change dressing daily. Please back 1/4inch plain packing to right foot dorsal wound and dress with 4x4 gauzes ABD pad and Kerlix.   Antibiotics- please finish two week of PO antibiotics Augmentin and Doxy per ID recommendation  Weight bearing- weight bearing as tolerated heel weight bearing in surgical shoe FOLLOW UP   1) please follow up Dr. Tg Blue within 1 week of discharge.  [phone] Please call within 1 week for appointment  2) Please follow-up with Dr. Perkins (vascular surgeon) in 1-2 weeks. Please call phone number below to schedule an appointment.   WOUND CARE - please have home care nursing change dressing daily. Please back 1/4inch plain packing to right foot dorsal wound and dress with 4x4 gauzes ABD pad and Kerlix.   Antibiotics- please finish two week of PO antibiotics Augmentin and Doxy per ID recommendation  Weight bearing- weight bearing as tolerated heel weight bearing in surgical shoe  DIET: Return to your usual diet.  BATHING: Please do not submerge wound underwater. You may shower and/or sponge bathe.  NOTIFY YOUR SURGEON IF: You have any bleeding that does not stop, any pus draining from your wound, any fever (over 100.4 F) or chills, persistent nausea/vomiting, persistent diarrhea, or if your pain is not controlled on your discharge pain medications. FOLLOW UP   1) please follow up Dr. Tg Blue within 1 week of discharge.  [phone] Please call within 1 week for appointment  2) Please follow-up with Dr. Perkins (vascular surgeon) in 1-2 weeks. Please call phone number below to schedule an appointment.   3) please follow-up with your primary medical doctor regarding your hospitalization and your diabetes.   WOUND CARE - please have home care nursing change dressing daily. Please back 1/4inch plain packing to right foot dorsal wound and dress with 4x4 gauzes ABD pad and Kerlix.   Antibiotics- please finish two week of PO antibiotics Augmentin and Doxy per ID recommendation  Weight bearing- weight bearing as tolerated heel weight bearing in surgical shoe  DIET: Return to your usual diet.  BATHING: Please do not submerge wound underwater. You may shower and/or sponge bathe.  NOTIFY YOUR SURGEON IF: You have any bleeding that does not stop, any pus draining from your wound, any fever (over 100.4 F) or chills, persistent nausea/vomiting, persistent diarrhea, or if your pain is not controlled on your discharge pain medications.

## 2022-05-09 NOTE — PHYSICAL THERAPY INITIAL EVALUATION ADULT - PERTINENT HX OF CURRENT PROBLEM, REHAB EVAL
This is a 35 yo M hx T1DM,  recurrent foot infection presenting due to weeks of left 5th toe pain. Imaging finding concerning for OM. Retention Suture Bite Size: 3 mm

## 2022-07-13 NOTE — ED PROVIDER NOTE - NS ED MD EM SELECTION
[address] Comprehensive Protopic Counseling: Patient may experience a mild burning sensation during topical application. Protopic is not approved in children less than 2 years of age. There have been case reports of hematologic and skin malignancies in patients using topical calcineurin inhibitors although causality is questionable.

## 2022-11-11 NOTE — PROGRESS NOTE ADULT - SUBJECTIVE AND OBJECTIVE BOX
RESUME ALL OF YOUR REGULAR HOME MEDICATIONS AS YOU WERE PREVIOUSLY TAKING THEM.      YOUR ONLY NEW MEDICATIONS FROM DR. PÉREZ ARE PAIN MEDICATION AND NAUSEA MEDICATION.          Patient is a 34y old  Male who presents with a chief complaint of Osteomyelitis (27 Dec 2019 18:05)       INTERVAL HPI/OVERNIGHT EVENTS:  Patient seen and evaluated at bedside.  Pt is resting comfortable in NAD. Denies N/V/F/C.    Allergies    No Known Allergies    Intolerances        Vital Signs Last 24 Hrs  T(C): 36.8 (28 Dec 2019 05:25), Max: 37.3 (27 Dec 2019 11:07)  T(F): 98.2 (28 Dec 2019 05:25), Max: 99.2 (27 Dec 2019 11:07)  HR: 89 (28 Dec 2019 05:25) (85 - 109)  BP: 119/76 (28 Dec 2019 05:25) (113/74 - 145/78)  BP(mean): 86 (27 Dec 2019 18:07) (83 - 94)  RR: 16 (28 Dec 2019 05:25) (13 - 24)  SpO2: 97% (28 Dec 2019 05:25) (95% - 100%)    LABS:                        13.2   9.96  )-----------( 295      ( 28 Dec 2019 05:45 )             40.4     12-28    133<L>  |  97<L>  |  15  ----------------------------<  242<H>  3.8   |  23  |  0.68    Ca    9.1      28 Dec 2019 05:45  Phos  2.7     12-27  Mg     1.8     12-28      PT/INR - ( 27 Dec 2019 06:00 )   PT: 12.4 SEC;   INR: 1.08          PTT - ( 27 Dec 2019 06:00 )  PTT:30.5 SEC    CAPILLARY BLOOD GLUCOSE      POCT Blood Glucose.: 238 mg/dL (27 Dec 2019 21:26)  POCT Blood Glucose.: 255 mg/dL (27 Dec 2019 19:37)  POCT Blood Glucose.: 252 mg/dL (27 Dec 2019 16:40)  POCT Blood Glucose.: 266 mg/dL (27 Dec 2019 11:10)      Lower Extremity Physical Exam:  s/p L foot partial fifth ray resection (DOS 12/27/19)  Sutures intact, no dehiscence, no acute SOI, mild proximal incision sanguinous drainage, streaking resolved, mild john-incisional erythema    RADIOLOGY & ADDITIONAL TESTS:

## 2023-02-02 NOTE — DISCHARGE NOTE NURSING/CASE MANAGEMENT/SOCIAL WORK - NSDCPEEMAIL_GEN_ALL_CORE
[facility] for Tobacco Control email [email or web address] PAST SURGICAL HISTORY:  No significant past surgical history     No significant past surgical history

## 2023-04-10 NOTE — PROGRESS NOTE ADULT - SUBJECTIVE AND OBJECTIVE BOX
SURGERY DAILY PROGRESS NOTE:     Hospital day: 3  Post operative day: 2    Overnight events: Blood cultures preliminarily negative x 48 hours. Pain well controlled.     Vital Signs Last 24 Hrs  T(C): 36.8, Max: 37.3 (05-29 @ 14:19)  T(F): 98.3, Max: 99.1 (05-29 @ 14:19)  HR: 78 (69 - 90)  BP: 90/52 (90/52 - 150/82)  BP(mean): --  RR: 18 (17 - 18)  SpO2: 100% (99% - 100%)    PHYSICAL EXAM:  GA: NAD  RLE: dressing clean/dry/intact     I&O's Detail  I & Os for 24h ending 29 May 2017 07:00  =============================================  IN:    IV PiggyBack: 100 ml    lactated ringers.: 60 ml    Total IN: 160 ml  ---------------------------------------------  OUT:    Voided: 3450 ml    Total OUT: 3450 ml  ---------------------------------------------  Total NET: -3290 ml    I & Os for current day (as of 30 May 2017 03:22)  =============================================  IN:    IV PiggyBack: 350 ml    Total IN: 350 ml  ---------------------------------------------  OUT:    Voided: 1800 ml    Total OUT: 1800 ml  ---------------------------------------------  Total NET: -1450 ml                            14.4   11.55 )-----------( 320      ( 29 May 2017 05:05 )             43.9       05-29    138  |  98  |  11  ----------------------------<  225<H>  4.2   |  28  |  0.82    Ca    9.3      29 May 2017 05:05  Phos  3.2     05-29  Mg     1.8     05-29 SURGERY DAILY PROGRESS NOTE:     Hospital day: 3  Post operative day: 2    Overnight events: Blood cultures preliminarily negative x 48 hours. pain controlled, no nausea/vomiting/headache/dizziness/chest pain/shortness of breath      PHYSICAL EXAM:  GA: NAD  RLE: dressing clean/dry/intact   Pulses: b/l DP palpable     Vital Signs Last 24 Hrs  T(C): 37, Max: 37.3 (05-29 @ 14:19)  T(F): 98.6, Max: 99.1 (05-29 @ 14:19)  HR: 75 (69 - 90)  BP: 103/61 (90/52 - 150/82)  BP(mean): --  RR: 18 (17 - 18)  SpO2: 98% (98% - 100%)    I&O's Detail    I & Os for current day (as of 30 May 2017 07:04)  =============================================  IN:    IV PiggyBack: 350 ml    Total IN: 350 ml  ---------------------------------------------  OUT:    Voided: 2500 ml    Total OUT: 2500 ml  ---------------------------------------------  Total NET: -2150 ml                            14.2   10.50 )-----------( 326      ( 30 May 2017 05:35 )             42.7       05-30    135  |  97<L>  |  11  ----------------------------<  185<H>  3.8   |  26  |  0.73    Ca    9.3      30 May 2017 05:35  Phos  3.2     05-29  Mg     1.8     05-29        CAPILLARY BLOOD GLUCOSE  289 (29 May 2017 22:13)  388 (29 May 2017 16:05)  337 (29 May 2017 11:48)  254 (29 May 2017 07:10)          PT/INR - ( 29 May 2017 05:05 )   PT: 12.2 SEC;   INR: 1.09          PTT - ( 29 May 2017 05:05 )  PTT:31.8 SEC none

## 2023-11-13 NOTE — PROGRESS NOTE ADULT - SUBJECTIVE AND OBJECTIVE BOX
November 13, 2023     Patient: Lien Begum   YOB: 1949   Date of Visit: 11/13/2023       To Whom It May Concern:    Lien Begum will need to take Amoxicillin 500 mg , I hour prior to dental procedure for life, due to total knee replacement      If you have any questions or concerns, please don't hesitate to call.         Sincerely,        David Ramon MD    CC: No Recipients   Patient is a 32y old  Male who presents with a chief complaint of "My foot got reinfected." (06 Jul 2017 00:50)       5/28 s/p RF partial fifth ray resection, dehiscence    INTERVAL HPI/OVERNIGHT EVENTS:  Patient seen and evaluated at bedside.  Pt is resting comfortable in NAD. Denies N/V/F/C.  Pain rated at X/10    Allergies    No Known Allergies    Intolerances        Vital Signs Last 24 Hrs  T(C): 36.7 (07 Jul 2017 05:27), Max: 37.1 (06 Jul 2017 12:54)  T(F): 98 (07 Jul 2017 05:27), Max: 98.7 (06 Jul 2017 12:54)  HR: 66 (07 Jul 2017 05:27) (66 - 74)  BP: 127/76 (07 Jul 2017 05:27) (126/76 - 149/86)  BP(mean): --  RR: 18 (07 Jul 2017 05:27) (18 - 18)  SpO2: 99% (07 Jul 2017 05:27) (99% - 100%)    LABS:                        12.7   10.99 )-----------( 261      ( 06 Jul 2017 06:05 )             38.7     07-06    141  |  101  |  9   ----------------------------<  61<L>  3.3<L>   |  24  |  0.65    Ca    8.9      06 Jul 2017 06:05  Phos  2.8     07-06  Mg     1.7     07-06    TPro  6.9  /  Alb  3.5  /  TBili  0.6  /  DBili  x   /  AST  33  /  ALT  37  /  AlkPhos  75  07-06        CAPILLARY BLOOD GLUCOSE  264 (07 Jul 2017 08:39)  273 (06 Jul 2017 21:57)  222 (06 Jul 2017 16:34)  227 (06 Jul 2017 12:00)          Lower Extremity Physical Exam:  Vasular: Pedal pulses palpable, B/L.   Neuro: Epicritic sensation diminished to the level of the foot, B/L.  Musculoskeletal/Ortho: S/p 5th ray amp, R foot  Skin:  R sx incision open, no drainage, no malodor, probes to bone and tunnels proximally, erythema significantly diminished      RADIOLOGY & ADDITIONAL TESTS:  < from: Xray Foot AP + Lateral + Oblique, Right (07.05.17 @ 21:00) >    IMPRESSION:  Right foot status post fifth metatarsal amputation without   evidence of soft tissue gas      < end of copied text >

## 2023-12-11 NOTE — ED ADULT TRIAGE NOTE - ACCOMPANIED BY
Called patient to confirmed appointment for 12/12 @ 0815 with Dr. Villagran. I had to leave a voicemail for patient to call back to confirm or to reschedule if needed.      Self

## 2024-03-02 NOTE — ED ADULT NURSE NOTE - DOES PATIENT HAVE ADVANCE DIRECTIVE
Problem:   Goal: Surgical incision will remain free of infection  Outcome: Monitoring/Evaluating progress  Goal: Activity level is resumed to level needed for discharge  Outcome: Monitoring/Evaluating progress  Goal: Verbalizes understanding of  birth and recovery in the hospital and after discharge  Description: Document on Patient Education Activity  Outcome: Monitoring/Evaluating progress     
No

## 2024-04-15 NOTE — ED ADULT TRIAGE NOTE - PAIN RATING/NUMBER SCALE (0-10): REST
As per 4/12 and 4/13/24 Rehab notes there is no assistance available for patient in the home after a Rehab stay.  This was discussed with Dr. Connelly and because the patient is currently mod/max A x 2 with therapies a longer subacute rehab stay is recommended.  Message left again for CCP.   Thank you--Kelsi Serrato  Rehab Admission Coordinator   7

## 2024-06-30 NOTE — PHYSICAL THERAPY INITIAL EVALUATION ADULT - WORK/LEISURE ACTIVITY, REHAB EVAL
Urine Dip Result:    Color Yellow and Clear  GLU Negative  ESTEVAN Negative  KET Negative  SG 1.010  BLO Negative  pH 6.0  PRO Negative  URO 0.2 EU/dL  NIT Negative  ANUJ Negative  
independent

## 2025-03-28 NOTE — PRE-OP CHECKLIST - ASSESSMENT, HISTORY & PHYSICAL COMPLETED AND ON MEDICAL RECORD
Hi, Strattera can cause n/v so it's possible that it is related. We actually started Strattera 2 weeks ago and I had said she could increase to 20 mg after  one week. Did she just increase to the 20 mg dose this week? I agree to make sure to take with food and call with an updated on Monday (though I'm not back in office until Tuesday). If not able to tolerate 20 mg dosing, we can discuss splitting the dose. Thanks! done

## 2025-06-17 NOTE — ED ADULT TRIAGE NOTE - CHIEF COMPLAINT QUOTE
SANTIAGO to Dr. Martinez's office.    Spoke with patient via phone regarding anticoagulation monitoring.   Last INR on 6/16/25 was 1.9.  Did one time dose increase and Lovenox continued.   Today's INR is 2.5 and is within goal range.    Current warfarin total weekly dose of 35 mg verified.  Informed the INR result is within therapeutic range and instructed to discontinue Lovenox and maintain current dose per protocol. Discussed dose and return date of 1 week for next INR. See Anticoagulation flowsheet.    See 5/30/25 TE for approved Lovenox bridge plan.    Pt reports noting small amount of red blood with loose stool over weekend, states was told this is not unusual following 6/13/25 Colonoscopy with polyp removal. No bleeding noted today. Patient states will continue to monitor and update GI & Anticoag Clinic as needed.     Patient denies any changes in diet.  Patient denies any unusual bruising.      Ar Britton DO is referring provider.    Dr. Grimaldo is in the office today supervising the treatment.    Instructed to contact the clinic with any unusual bleeding or bruising, any changes in medications, diet, health status, lifestyle, or any other changes, questions or concerns. Verbalized understanding of all discussed.      Pt states had right foot toe amputated end of may. pt states had suture removed about 3 weeks ago  then last friday had toe re stitched. Pt today has increased drainage with odor. and co chills. Pts finger 91.

## 2025-07-17 NOTE — ED ADULT TRIAGE NOTE - CHIEF COMPLAINT QUOTE
ENDOCRINE MEDICAL ASSISTANT ROOMING NOTE  Is the patient here for diabetes mellitus: NO     Rooming documentation of social history includes tobacco screening only.  Medication list reviewed and updated.    PCP: Christina Monteiro MD    Patient would like communication of their results via:   Cell Phone:   Telephone Information:   Mobile [phone]   Mobile [phone]     Okay to leave a message containing results? Yes    Refills Needed: No    pt with Hx. right foot 5th toe Sx. in May coming with and increase pain/swelling since Sat.    Denies fever/no erythema to area   Hx. pt with Hx. right foot 5th toe Sx. in May coming with and increase pain/swelling since Sat.    Denies fever/no erythema to area,    PE=290  Hx.